# Patient Record
Sex: FEMALE | Race: WHITE | Employment: OTHER | URBAN - METROPOLITAN AREA
[De-identification: names, ages, dates, MRNs, and addresses within clinical notes are randomized per-mention and may not be internally consistent; named-entity substitution may affect disease eponyms.]

---

## 2019-01-01 ENCOUNTER — APPOINTMENT (OUTPATIENT)
Dept: CT IMAGING | Facility: CLINIC | Age: 84
DRG: 981 | End: 2019-01-01
Attending: EMERGENCY MEDICINE
Payer: COMMERCIAL

## 2019-01-01 ENCOUNTER — APPOINTMENT (OUTPATIENT)
Dept: GENERAL RADIOLOGY | Facility: CLINIC | Age: 84
End: 2019-01-01
Payer: COMMERCIAL

## 2019-01-01 ENCOUNTER — APPOINTMENT (OUTPATIENT)
Dept: CT IMAGING | Facility: CLINIC | Age: 84
DRG: 981 | End: 2019-01-01
Attending: HOSPITALIST
Payer: COMMERCIAL

## 2019-01-01 ENCOUNTER — HOSPITAL ENCOUNTER (OUTPATIENT)
Facility: CLINIC | Age: 84
Setting detail: OBSERVATION
Discharge: HOME | End: 2019-08-14
Attending: EMERGENCY MEDICINE | Admitting: INTERNAL MEDICINE
Payer: COMMERCIAL

## 2019-01-01 ENCOUNTER — APPOINTMENT (OUTPATIENT)
Dept: ULTRASOUND IMAGING | Facility: CLINIC | Age: 84
DRG: 981 | End: 2019-01-01
Attending: INTERNAL MEDICINE
Payer: COMMERCIAL

## 2019-01-01 ENCOUNTER — HOSPITAL ENCOUNTER (EMERGENCY)
Facility: CLINIC | Age: 84
Discharge: HOME | End: 2019-07-16
Admitting: PHYSICIAN ASSISTANT
Payer: COMMERCIAL

## 2019-01-01 ENCOUNTER — APPOINTMENT (OUTPATIENT)
Dept: GENERAL RADIOLOGY | Facility: CLINIC | Age: 84
DRG: 981 | End: 2019-01-01
Attending: EMERGENCY MEDICINE
Payer: COMMERCIAL

## 2019-01-01 ENCOUNTER — ANESTHESIA EVENT (OUTPATIENT)
Dept: SURGERY | Facility: CLINIC | Age: 84
DRG: 981 | End: 2019-01-01
Payer: COMMERCIAL

## 2019-01-01 ENCOUNTER — APPOINTMENT (OUTPATIENT)
Dept: ULTRASOUND IMAGING | Facility: CLINIC | Age: 84
End: 2019-01-01
Payer: COMMERCIAL

## 2019-01-01 ENCOUNTER — APPOINTMENT (OUTPATIENT)
Dept: PHYSICAL THERAPY | Facility: CLINIC | Age: 84
DRG: 981 | End: 2019-01-01
Attending: HOSPITALIST
Payer: COMMERCIAL

## 2019-01-01 ENCOUNTER — ANESTHESIA (OUTPATIENT)
Dept: SURGERY | Facility: CLINIC | Age: 84
DRG: 981 | End: 2019-01-01
Payer: COMMERCIAL

## 2019-01-01 ENCOUNTER — HOSPITAL ENCOUNTER (INPATIENT)
Facility: CLINIC | Age: 84
LOS: 8 days | DRG: 981 | End: 2019-09-05
Attending: EMERGENCY MEDICINE | Admitting: INTERNAL MEDICINE
Payer: COMMERCIAL

## 2019-01-01 ENCOUNTER — TRANSFERRED RECORDS (OUTPATIENT)
Dept: HEALTH INFORMATION MANAGEMENT | Facility: CLINIC | Age: 84
End: 2019-01-01

## 2019-01-01 ENCOUNTER — APPOINTMENT (OUTPATIENT)
Dept: PHYSICAL THERAPY | Facility: CLINIC | Age: 84
End: 2019-01-01
Attending: INTERNAL MEDICINE
Payer: COMMERCIAL

## 2019-01-01 ENCOUNTER — APPOINTMENT (OUTPATIENT)
Dept: CT IMAGING | Facility: CLINIC | Age: 84
End: 2019-01-01
Attending: EMERGENCY MEDICINE
Payer: COMMERCIAL

## 2019-01-01 ENCOUNTER — APPOINTMENT (OUTPATIENT)
Dept: CARDIOLOGY | Facility: CLINIC | Age: 84
DRG: 981 | End: 2019-01-01
Attending: HOSPITALIST
Payer: COMMERCIAL

## 2019-01-01 ENCOUNTER — APPOINTMENT (OUTPATIENT)
Dept: SPEECH THERAPY | Facility: CLINIC | Age: 84
DRG: 981 | End: 2019-01-01
Attending: STUDENT IN AN ORGANIZED HEALTH CARE EDUCATION/TRAINING PROGRAM
Payer: COMMERCIAL

## 2019-01-01 ENCOUNTER — APPOINTMENT (OUTPATIENT)
Dept: GENERAL RADIOLOGY | Facility: CLINIC | Age: 84
DRG: 981 | End: 2019-01-01
Attending: SURGERY
Payer: COMMERCIAL

## 2019-01-01 ENCOUNTER — APPOINTMENT (OUTPATIENT)
Dept: GENERAL RADIOLOGY | Facility: CLINIC | Age: 84
DRG: 981 | End: 2019-01-01
Payer: COMMERCIAL

## 2019-01-01 VITALS
TEMPERATURE: 96.2 F | SYSTOLIC BLOOD PRESSURE: 154 MMHG | HEIGHT: 62 IN | RESPIRATION RATE: 20 BRPM | HEART RATE: 59 BPM | WEIGHT: 107 LBS | BODY MASS INDEX: 19.69 KG/M2 | DIASTOLIC BLOOD PRESSURE: 44 MMHG | OXYGEN SATURATION: 93 %

## 2019-01-01 VITALS
DIASTOLIC BLOOD PRESSURE: 67 MMHG | BODY MASS INDEX: 20.61 KG/M2 | WEIGHT: 112 LBS | OXYGEN SATURATION: 92 % | HEIGHT: 62 IN | SYSTOLIC BLOOD PRESSURE: 179 MMHG | HEART RATE: 82 BPM | RESPIRATION RATE: 18 BRPM

## 2019-01-01 VITALS
RESPIRATION RATE: 16 BRPM | SYSTOLIC BLOOD PRESSURE: 128 MMHG | HEART RATE: 73 BPM | TEMPERATURE: 97.4 F | WEIGHT: 121.25 LBS | DIASTOLIC BLOOD PRESSURE: 62 MMHG | BODY MASS INDEX: 22.31 KG/M2 | HEIGHT: 62 IN | OXYGEN SATURATION: 91 %

## 2019-01-01 DIAGNOSIS — J44.1 COPD EXACERBATION (H): ICD-10-CM

## 2019-01-01 DIAGNOSIS — R91.8 LUNG INFILTRATE: ICD-10-CM

## 2019-01-01 DIAGNOSIS — R06.00 DYSPNEA, UNSPECIFIED TYPE: ICD-10-CM

## 2019-01-01 DIAGNOSIS — S32.10XA CLOSED FRACTURE OF SACRUM, UNSPECIFIED PORTION OF SACRUM, INITIAL ENCOUNTER (H): ICD-10-CM

## 2019-01-01 LAB
ALBUMIN SERPL-MCNC: 2.6 G/DL (ref 3.4–5)
ALBUMIN SERPL-MCNC: 2.9 G/DL (ref 3.4–5)
ALBUMIN SERPL-MCNC: 3.4 G/DL (ref 3.4–5)
ALBUMIN UR-MCNC: 100 MG/DL
ALBUMIN UR-MCNC: 30 MG/DL
ALP SERPL-CCNC: 142 U/L (ref 40–150)
ALP SERPL-CCNC: 73 U/L (ref 40–150)
ALP SERPL-CCNC: 83 U/L (ref 40–150)
ALT SERPL W P-5'-P-CCNC: 15 U/L (ref 0–50)
ALT SERPL W P-5'-P-CCNC: 27 U/L (ref 0–50)
ALT SERPL W P-5'-P-CCNC: 36 U/L (ref 0–50)
ANION GAP SERPL CALCULATED.3IONS-SCNC: 1 MMOL/L (ref 3–14)
ANION GAP SERPL CALCULATED.3IONS-SCNC: 3 MMOL/L (ref 3–14)
ANION GAP SERPL CALCULATED.3IONS-SCNC: 3 MMOL/L (ref 3–14)
ANION GAP SERPL CALCULATED.3IONS-SCNC: 4 MMOL/L (ref 3–14)
ANION GAP SERPL CALCULATED.3IONS-SCNC: 5 MMOL/L (ref 3–14)
ANION GAP SERPL CALCULATED.3IONS-SCNC: 7 MMOL/L (ref 3–14)
ANION GAP SERPL CALCULATED.3IONS-SCNC: 8 MMOL/L (ref 3–14)
ANION GAP SERPL CALCULATED.3IONS-SCNC: <1 MMOL/L (ref 3–14)
APPEARANCE UR: CLEAR
APPEARANCE UR: CLEAR
AST SERPL W P-5'-P-CCNC: 17 U/L (ref 0–45)
AST SERPL W P-5'-P-CCNC: 20 U/L (ref 0–45)
AST SERPL W P-5'-P-CCNC: 23 U/L (ref 0–45)
BASE DEFICIT BLDA-SCNC: 1.3 MMOL/L
BASE DEFICIT BLDA-SCNC: 2.3 MMOL/L
BASE EXCESS BLDA CALC-SCNC: 2.9 MMOL/L
BASE EXCESS BLDA CALC-SCNC: 5.8 MMOL/L
BASE EXCESS BLDA CALC-SCNC: 7.9 MMOL/L
BASE EXCESS BLDV CALC-SCNC: 7.5 MMOL/L
BASE EXCESS BLDV CALC-SCNC: 9.2 MMOL/L
BASOPHILS # BLD AUTO: 0 10E9/L (ref 0–0.2)
BASOPHILS NFR BLD AUTO: 0.1 %
BASOPHILS NFR BLD AUTO: 0.2 %
BASOPHILS NFR BLD AUTO: 0.4 %
BILIRUB SERPL-MCNC: 0.2 MG/DL (ref 0.2–1.3)
BILIRUB SERPL-MCNC: 0.3 MG/DL (ref 0.2–1.3)
BILIRUB SERPL-MCNC: 0.3 MG/DL (ref 0.2–1.3)
BILIRUB UR QL STRIP: NEGATIVE
BILIRUB UR QL STRIP: NEGATIVE
BUN SERPL-MCNC: 14 MG/DL (ref 7–30)
BUN SERPL-MCNC: 15 MG/DL (ref 7–30)
BUN SERPL-MCNC: 37 MG/DL (ref 7–30)
BUN SERPL-MCNC: 40 MG/DL (ref 7–30)
BUN SERPL-MCNC: 40 MG/DL (ref 7–30)
BUN SERPL-MCNC: 42 MG/DL (ref 7–30)
BUN SERPL-MCNC: 42 MG/DL (ref 7–30)
BUN SERPL-MCNC: 44 MG/DL (ref 7–30)
BUN SERPL-MCNC: 46 MG/DL (ref 7–30)
CA-I SERPL ISE-MCNC: 4.6 MG/DL (ref 4.4–5.2)
CALCIUM SERPL-MCNC: 7.8 MG/DL (ref 8.5–10.1)
CALCIUM SERPL-MCNC: 7.9 MG/DL (ref 8.5–10.1)
CALCIUM SERPL-MCNC: 8.2 MG/DL (ref 8.5–10.1)
CALCIUM SERPL-MCNC: 8.2 MG/DL (ref 8.5–10.1)
CALCIUM SERPL-MCNC: 8.4 MG/DL (ref 8.5–10.1)
CALCIUM SERPL-MCNC: 8.4 MG/DL (ref 8.5–10.1)
CALCIUM SERPL-MCNC: 8.6 MG/DL (ref 8.5–10.1)
CALCIUM SERPL-MCNC: 8.8 MG/DL (ref 8.5–10.1)
CALCIUM SERPL-MCNC: 9.2 MG/DL (ref 8.5–10.1)
CHLORIDE SERPL-SCNC: 101 MMOL/L (ref 94–109)
CHLORIDE SERPL-SCNC: 102 MMOL/L (ref 94–109)
CHLORIDE SERPL-SCNC: 105 MMOL/L (ref 94–109)
CHLORIDE SERPL-SCNC: 107 MMOL/L (ref 94–109)
CHLORIDE SERPL-SCNC: 107 MMOL/L (ref 94–109)
CHLORIDE SERPL-SCNC: 110 MMOL/L (ref 94–109)
CHLORIDE SERPL-SCNC: 113 MMOL/L (ref 94–109)
CHLORIDE SERPL-SCNC: 115 MMOL/L (ref 94–109)
CHLORIDE SERPL-SCNC: 98 MMOL/L (ref 94–109)
CO2 BLDCOV-SCNC: 37 MMOL/L (ref 21–28)
CO2 SERPL-SCNC: 26 MMOL/L (ref 20–32)
CO2 SERPL-SCNC: 28 MMOL/L (ref 20–32)
CO2 SERPL-SCNC: 28 MMOL/L (ref 20–32)
CO2 SERPL-SCNC: 29 MMOL/L (ref 20–32)
CO2 SERPL-SCNC: 32 MMOL/L (ref 20–32)
CO2 SERPL-SCNC: 32 MMOL/L (ref 20–32)
CO2 SERPL-SCNC: 33 MMOL/L (ref 20–32)
CO2 SERPL-SCNC: 36 MMOL/L (ref 20–32)
CO2 SERPL-SCNC: 41 MMOL/L (ref 20–32)
COLOR UR AUTO: YELLOW
COLOR UR AUTO: YELLOW
CREAT SERPL-MCNC: 0.63 MG/DL (ref 0.55–1.02)
CREAT SERPL-MCNC: 0.66 MG/DL (ref 0.52–1.04)
CREAT SERPL-MCNC: 0.69 MG/DL (ref 0.52–1.04)
CREAT SERPL-MCNC: 0.73 MG/DL (ref 0.52–1.04)
CREAT SERPL-MCNC: 0.75 MG/DL (ref 0.52–1.04)
CREAT SERPL-MCNC: 0.77 MG/DL (ref 0.52–1.04)
CREAT SERPL-MCNC: 0.78 MG/DL (ref 0.52–1.04)
CREAT SERPL-MCNC: 0.82 MG/DL (ref 0.52–1.04)
CREAT SERPL-MCNC: 0.83 MG/DL (ref 0.52–1.04)
CREAT SERPL-MCNC: 0.92 MG/DL (ref 0.52–1.04)
CREAT SERPL-MCNC: 0.97 MG/DL (ref 0.52–1.04)
CREAT UR-MCNC: 92 MG/DL
D DIMER PPP FEU-MCNC: 2.4 UG/ML FEU (ref 0–0.5)
DIFFERENTIAL METHOD BLD: ABNORMAL
EOSINOPHIL # BLD AUTO: 0.1 10E9/L (ref 0–0.7)
EOSINOPHIL NFR BLD AUTO: 0.7 %
EOSINOPHIL NFR BLD AUTO: 1.2 %
EOSINOPHIL NFR BLD AUTO: 1.3 %
ERYTHROCYTE [DISTWIDTH] IN BLOOD BY AUTOMATED COUNT: 14 % (ref 10–15)
ERYTHROCYTE [DISTWIDTH] IN BLOOD BY AUTOMATED COUNT: 14.8 % (ref 10–15)
ERYTHROCYTE [DISTWIDTH] IN BLOOD BY AUTOMATED COUNT: 15.6 % (ref 10–15)
ERYTHROCYTE [DISTWIDTH] IN BLOOD BY AUTOMATED COUNT: 15.7 % (ref 10–15)
ERYTHROCYTE [DISTWIDTH] IN BLOOD BY AUTOMATED COUNT: 16 % (ref 10–15)
ERYTHROCYTE [DISTWIDTH] IN BLOOD BY AUTOMATED COUNT: 16.1 % (ref 10–15)
FRACT EXCRET NA UR+SERPL-RTO: 0.1 %
GFR SERPL CREATININE-BSD FRML MDRD: 52 ML/MIN/{1.73_M2}
GFR SERPL CREATININE-BSD FRML MDRD: 56 ML/MIN/{1.73_M2}
GFR SERPL CREATININE-BSD FRML MDRD: 63 ML/MIN/{1.73_M2}
GFR SERPL CREATININE-BSD FRML MDRD: 64 ML/MIN/{1.73_M2}
GFR SERPL CREATININE-BSD FRML MDRD: 68 ML/MIN/{1.73_M2}
GFR SERPL CREATININE-BSD FRML MDRD: 69 ML/MIN/{1.73_M2}
GFR SERPL CREATININE-BSD FRML MDRD: 72 ML/MIN/{1.73_M2}
GFR SERPL CREATININE-BSD FRML MDRD: 74 ML/MIN/{1.73_M2}
GFR SERPL CREATININE-BSD FRML MDRD: 78 ML/MIN/{1.73_M2}
GFR SERPL CREATININE-BSD FRML MDRD: 79 ML/MIN/{1.73_M2}
GFR SERPL CREATININE-BSD FRML MDRD: >60 ML/MIN/1.73M2
GLUCOSE BLDC GLUCOMTR-MCNC: 100 MG/DL (ref 70–99)
GLUCOSE BLDC GLUCOMTR-MCNC: 102 MG/DL (ref 70–99)
GLUCOSE BLDC GLUCOMTR-MCNC: 102 MG/DL (ref 70–99)
GLUCOSE BLDC GLUCOMTR-MCNC: 103 MG/DL (ref 70–99)
GLUCOSE BLDC GLUCOMTR-MCNC: 114 MG/DL (ref 70–99)
GLUCOSE BLDC GLUCOMTR-MCNC: 118 MG/DL (ref 70–99)
GLUCOSE BLDC GLUCOMTR-MCNC: 124 MG/DL (ref 70–99)
GLUCOSE BLDC GLUCOMTR-MCNC: 124 MG/DL (ref 70–99)
GLUCOSE BLDC GLUCOMTR-MCNC: 125 MG/DL (ref 70–99)
GLUCOSE BLDC GLUCOMTR-MCNC: 135 MG/DL (ref 70–99)
GLUCOSE BLDC GLUCOMTR-MCNC: 136 MG/DL (ref 70–99)
GLUCOSE BLDC GLUCOMTR-MCNC: 140 MG/DL (ref 70–99)
GLUCOSE BLDC GLUCOMTR-MCNC: 147 MG/DL (ref 70–99)
GLUCOSE BLDC GLUCOMTR-MCNC: 83 MG/DL (ref 70–99)
GLUCOSE BLDC GLUCOMTR-MCNC: 93 MG/DL (ref 70–99)
GLUCOSE BLDC GLUCOMTR-MCNC: 97 MG/DL (ref 70–99)
GLUCOSE SERPL-MCNC: 102 MG/DL (ref 70–99)
GLUCOSE SERPL-MCNC: 104 MG/DL (ref 70–100)
GLUCOSE SERPL-MCNC: 110 MG/DL (ref 70–99)
GLUCOSE SERPL-MCNC: 112 MG/DL (ref 70–99)
GLUCOSE SERPL-MCNC: 114 MG/DL (ref 70–99)
GLUCOSE SERPL-MCNC: 122 MG/DL (ref 70–99)
GLUCOSE SERPL-MCNC: 130 MG/DL (ref 70–99)
GLUCOSE SERPL-MCNC: 155 MG/DL (ref 70–99)
GLUCOSE SERPL-MCNC: 85 MG/DL (ref 70–99)
GLUCOSE SERPL-MCNC: 86 MG/DL (ref 70–99)
GLUCOSE UR STRIP-MCNC: NEGATIVE MG/DL
GLUCOSE UR STRIP-MCNC: NEGATIVE MG/DL
HCO3 BLD-SCNC: 25 MMOL/L (ref 21–28)
HCO3 BLD-SCNC: 26 MMOL/L (ref 21–28)
HCO3 BLD-SCNC: 29 MMOL/L (ref 21–28)
HCO3 BLD-SCNC: 32 MMOL/L (ref 21–28)
HCO3 BLD-SCNC: 32 MMOL/L (ref 21–28)
HCO3 BLDV-SCNC: 35 MMOL/L (ref 21–28)
HCO3 BLDV-SCNC: 36 MMOL/L (ref 21–28)
HCT VFR BLD AUTO: 26.9 % (ref 35–47)
HCT VFR BLD AUTO: 27.3 % (ref 35–47)
HCT VFR BLD AUTO: 31.7 % (ref 35–47)
HCT VFR BLD AUTO: 32.8 % (ref 35–47)
HCT VFR BLD AUTO: 32.9 % (ref 35–47)
HCT VFR BLD AUTO: 36.1 % (ref 35–47)
HCT VFR BLD AUTO: 37.7 % (ref 35–47)
HCT VFR BLD AUTO: 41.1 % (ref 35–47)
HCT VFR BLD AUTO: 42.7 % (ref 35–47)
HGB BLD-MCNC: 10 G/DL (ref 11.7–15.7)
HGB BLD-MCNC: 10.4 G/DL (ref 11.7–15.7)
HGB BLD-MCNC: 10.6 G/DL (ref 11.7–15.7)
HGB BLD-MCNC: 11 G/DL (ref 11.7–15.7)
HGB BLD-MCNC: 11.9 G/DL (ref 11.7–15.7)
HGB BLD-MCNC: 12.8 G/DL (ref 11.7–15.7)
HGB BLD-MCNC: 12.8 G/DL (ref 11.7–15.7)
HGB BLD-MCNC: 8.9 G/DL (ref 11.7–15.7)
HGB BLD-MCNC: 9.1 G/DL (ref 11.7–15.7)
HGB UR QL STRIP: NEGATIVE
HGB UR QL STRIP: NEGATIVE
HYALINE CASTS #/AREA URNS LPF: 2 /LPF (ref 0–2)
IMM GRANULOCYTES # BLD: 0 10E9/L (ref 0–0.4)
IMM GRANULOCYTES # BLD: 0.1 10E9/L (ref 0–0.4)
IMM GRANULOCYTES # BLD: 0.1 10E9/L (ref 0–0.4)
IMM GRANULOCYTES NFR BLD: 0.2 %
IMM GRANULOCYTES NFR BLD: 0.6 %
IMM GRANULOCYTES NFR BLD: 0.9 %
INTERPRETATION ECG - MUSE: NORMAL
KETONES UR STRIP-MCNC: 10 MG/DL
KETONES UR STRIP-MCNC: NEGATIVE MG/DL
LACTATE BLD-SCNC: 0.7 MMOL/L (ref 0.7–2)
LACTATE BLD-SCNC: 0.7 MMOL/L (ref 0.7–2.1)
LACTATE BLD-SCNC: 1.2 MMOL/L (ref 0.7–2)
LACTATE SERPL-SCNC: 0.5 MMOL/L (ref 0.4–2)
LEUKOCYTE ESTERASE UR QL STRIP: NEGATIVE
LEUKOCYTE ESTERASE UR QL STRIP: NEGATIVE
LYMPHOCYTES # BLD AUTO: 0.6 10E9/L (ref 0.8–5.3)
LYMPHOCYTES # BLD AUTO: 0.8 10E9/L (ref 0.8–5.3)
LYMPHOCYTES # BLD AUTO: 0.8 10E9/L (ref 0.8–5.3)
LYMPHOCYTES NFR BLD AUTO: 7 %
LYMPHOCYTES NFR BLD AUTO: 7.4 %
LYMPHOCYTES NFR BLD AUTO: 9.7 %
MAGNESIUM SERPL-MCNC: 2.2 MG/DL (ref 1.6–2.3)
MAGNESIUM SERPL-MCNC: 2.3 MG/DL (ref 1.6–2.3)
MAGNESIUM SERPL-MCNC: 2.3 MG/DL (ref 1.6–2.3)
MAGNESIUM SERPL-MCNC: 2.6 MG/DL (ref 1.6–2.3)
MAGNESIUM SERPL-MCNC: 2.8 MG/DL (ref 1.6–2.3)
MCH RBC QN AUTO: 29.4 PG (ref 26.5–33)
MCH RBC QN AUTO: 29.9 PG (ref 26.5–33)
MCH RBC QN AUTO: 30.1 PG (ref 26.5–33)
MCH RBC QN AUTO: 30.1 PG (ref 26.5–33)
MCH RBC QN AUTO: 30.2 PG (ref 26.5–33)
MCH RBC QN AUTO: 30.3 PG (ref 26.5–33)
MCH RBC QN AUTO: 31 PG (ref 26.5–33)
MCHC RBC AUTO-ENTMCNC: 30 G/DL (ref 31.5–36.5)
MCHC RBC AUTO-ENTMCNC: 30.5 G/DL (ref 31.5–36.5)
MCHC RBC AUTO-ENTMCNC: 31.1 G/DL (ref 31.5–36.5)
MCHC RBC AUTO-ENTMCNC: 31.5 G/DL (ref 31.5–36.5)
MCHC RBC AUTO-ENTMCNC: 31.6 G/DL (ref 31.5–36.5)
MCHC RBC AUTO-ENTMCNC: 31.7 G/DL (ref 31.5–36.5)
MCHC RBC AUTO-ENTMCNC: 32.2 G/DL (ref 31.5–36.5)
MCHC RBC AUTO-ENTMCNC: 32.6 G/DL (ref 31.5–36.5)
MCHC RBC AUTO-ENTMCNC: 33.8 G/DL (ref 31.5–36.5)
MCV RBC AUTO: 101 FL (ref 78–100)
MCV RBC AUTO: 92 FL (ref 78–100)
MCV RBC AUTO: 93 FL (ref 78–100)
MCV RBC AUTO: 94 FL (ref 78–100)
MCV RBC AUTO: 94 FL (ref 78–100)
MCV RBC AUTO: 95 FL (ref 78–100)
MCV RBC AUTO: 96 FL (ref 78–100)
MCV RBC AUTO: 97 FL (ref 78–100)
MCV RBC AUTO: 97 FL (ref 78–100)
MONOCYTES # BLD AUTO: 0.8 10E9/L (ref 0–1.3)
MONOCYTES # BLD AUTO: 0.9 10E9/L (ref 0–1.3)
MONOCYTES # BLD AUTO: 1 10E9/L (ref 0–1.3)
MONOCYTES NFR BLD AUTO: 10.7 %
MONOCYTES NFR BLD AUTO: 8.6 %
MONOCYTES NFR BLD AUTO: 8.7 %
MUCOUS THREADS #/AREA URNS LPF: PRESENT /LPF
MUCOUS THREADS #/AREA URNS LPF: PRESENT /LPF
NEUTROPHILS # BLD AUTO: 6.4 10E9/L (ref 1.6–8.3)
NEUTROPHILS # BLD AUTO: 7.1 10E9/L (ref 1.6–8.3)
NEUTROPHILS # BLD AUTO: 9.7 10E9/L (ref 1.6–8.3)
NEUTROPHILS NFR BLD AUTO: 78.3 %
NEUTROPHILS NFR BLD AUTO: 81.8 %
NEUTROPHILS NFR BLD AUTO: 82.2 %
NITRATE UR QL: NEGATIVE
NITRATE UR QL: NEGATIVE
NRBC # BLD AUTO: 0 10*3/UL
NRBC BLD AUTO-RTO: 0 /100
NT-PROBNP SERPL-MCNC: 568 PG/ML (ref 0–1800)
NT-PROBNP SERPL-MCNC: 967 PG/ML (ref 0–1800)
O2/TOTAL GAS SETTING VFR VENT: ABNORMAL %
O2/TOTAL GAS SETTING VFR VENT: ABNORMAL %
OXYHGB MFR BLD: 93 % (ref 92–100)
OXYHGB MFR BLD: 95 % (ref 92–100)
OXYHGB MFR BLD: 95 % (ref 92–100)
OXYHGB MFR BLD: 96 % (ref 92–100)
OXYHGB MFR BLD: 98 % (ref 92–100)
OXYHGB MFR BLDV: 82 %
OXYHGB MFR BLDV: 89 %
PCO2 BLD: 37 MM HG (ref 35–45)
PCO2 BLD: 39 MM HG (ref 35–45)
PCO2 BLD: 45 MM HG (ref 35–45)
PCO2 BLD: 60 MM HG (ref 35–45)
PCO2 BLD: 77 MM HG (ref 35–45)
PCO2 BLDV: 61 MM HG (ref 40–50)
PCO2 BLDV: 62 MM HG (ref 40–50)
PCO2 BLDV: 64 MM HG (ref 40–50)
PH BLD: 7.23 PH (ref 7.35–7.45)
PH BLD: 7.24 PH (ref 7.35–7.45)
PH BLD: 7.34 PH (ref 7.35–7.45)
PH BLD: 7.51 PH (ref 7.35–7.45)
PH BLD: 7.51 PH (ref 7.35–7.45)
PH BLDV: 7.35 PH (ref 7.32–7.43)
PH BLDV: 7.37 PH (ref 7.32–7.43)
PH BLDV: 7.38 PH (ref 7.32–7.43)
PH UR STRIP: 5.5 PH (ref 5–7)
PH UR STRIP: 6.5 PH (ref 5–7)
PHOSPHATE SERPL-MCNC: 4.2 MG/DL (ref 2.5–4.5)
PHOSPHATE SERPL-MCNC: 4.3 MG/DL (ref 2.5–4.5)
PHOSPHATE SERPL-MCNC: 4.7 MG/DL (ref 2.5–4.5)
PLATELET # BLD AUTO: 187 10E9/L (ref 150–450)
PLATELET # BLD AUTO: 197 10E9/L (ref 150–450)
PLATELET # BLD AUTO: 216 10E9/L (ref 150–450)
PLATELET # BLD AUTO: 273 10E9/L (ref 150–450)
PLATELET # BLD AUTO: 283 10E9/L (ref 150–450)
PLATELET # BLD AUTO: 303 10E9/L (ref 150–450)
PLATELET # BLD AUTO: 310 10E9/L (ref 150–450)
PLATELET # BLD AUTO: 323 10E9/L (ref 150–450)
PLATELET # BLD AUTO: 444 10E9/L (ref 150–450)
PO2 BLD: 105 MM HG (ref 80–105)
PO2 BLD: 116 MM HG (ref 80–105)
PO2 BLD: 136 MM HG (ref 80–105)
PO2 BLD: 67 MM HG (ref 80–105)
PO2 BLD: 98 MM HG (ref 80–105)
PO2 BLDV: 52 MM HG (ref 25–47)
PO2 BLDV: 60 MM HG (ref 25–47)
PO2 BLDV: 63 MM HG (ref 25–47)
POTASSIUM SERPL-SCNC: 3.2 MMOL/L (ref 3.4–5.3)
POTASSIUM SERPL-SCNC: 3.4 MMOL/L (ref 3.4–5.3)
POTASSIUM SERPL-SCNC: 3.7 MMOL/L (ref 3.4–5.3)
POTASSIUM SERPL-SCNC: 4 MMOL/L (ref 3.4–5.3)
POTASSIUM SERPL-SCNC: 4.2 MMOL/L (ref 3.5–5.1)
POTASSIUM SERPL-SCNC: 4.4 MMOL/L (ref 3.4–5.3)
POTASSIUM SERPL-SCNC: 4.7 MMOL/L (ref 3.4–5.3)
POTASSIUM SERPL-SCNC: 4.7 MMOL/L (ref 3.4–5.3)
PROCALCITONIN SERPL-MCNC: 0.39 NG/ML
PROCALCITONIN SERPL-MCNC: <0.05 NG/ML
PROT SERPL-MCNC: 5.5 G/DL (ref 6.8–8.8)
PROT SERPL-MCNC: 6.2 G/DL (ref 6.8–8.8)
PROT SERPL-MCNC: 6.3 G/DL (ref 6.8–8.8)
RADIOLOGIST FLAGS: ABNORMAL
RBC # BLD AUTO: 2.94 10E12/L (ref 3.8–5.2)
RBC # BLD AUTO: 2.95 10E12/L (ref 3.8–5.2)
RBC # BLD AUTO: 3.32 10E12/L (ref 3.8–5.2)
RBC # BLD AUTO: 3.48 10E12/L (ref 3.8–5.2)
RBC # BLD AUTO: 3.5 10E12/L (ref 3.8–5.2)
RBC # BLD AUTO: 3.74 10E12/L (ref 3.8–5.2)
RBC # BLD AUTO: 3.96 10E12/L (ref 3.8–5.2)
RBC # BLD AUTO: 4.22 10E12/L (ref 3.8–5.2)
RBC # BLD AUTO: 4.23 10E12/L (ref 3.8–5.2)
RBC #/AREA URNS AUTO: 1 /HPF (ref 0–2)
RBC #/AREA URNS AUTO: 3 /HPF (ref 0–2)
SAO2 % BLDV FROM PO2: 89 %
SODIUM SERPL-SCNC: 136 MMOL/L (ref 133–144)
SODIUM SERPL-SCNC: 139 MMOL/L (ref 133–144)
SODIUM SERPL-SCNC: 139 MMOL/L (ref 133–144)
SODIUM SERPL-SCNC: 142 MMOL/L (ref 133–144)
SODIUM SERPL-SCNC: 142 MMOL/L (ref 133–144)
SODIUM SERPL-SCNC: 143 MMOL/L (ref 133–144)
SODIUM SERPL-SCNC: 144 MMOL/L (ref 133–144)
SODIUM SERPL-SCNC: 147 MMOL/L (ref 133–144)
SODIUM SERPL-SCNC: 147 MMOL/L (ref 133–144)
SODIUM SERPL-SCNC: 148 MMOL/L (ref 133–144)
SODIUM UR-SCNC: 18 MMOL/L
SOURCE: ABNORMAL
SOURCE: ABNORMAL
SP GR UR STRIP: 1.02 (ref 1–1.03)
SP GR UR STRIP: 1.02 (ref 1–1.03)
SQUAMOUS #/AREA URNS AUTO: <1 /HPF (ref 0–1)
TROPONIN I SERPL-MCNC: <0.015 UG/L (ref 0–0.04)
TROPONIN I SERPL-MCNC: <0.015 UG/L (ref 0–0.04)
TSH SERPL DL<=0.005 MIU/L-ACNC: 1.61 MU/L (ref 0.4–4)
UROBILINOGEN UR STRIP-MCNC: NORMAL MG/DL (ref 0–2)
UROBILINOGEN UR STRIP-MCNC: NORMAL MG/DL (ref 0–2)
WBC # BLD AUTO: 11.7 10E9/L (ref 4–11)
WBC # BLD AUTO: 11.7 10E9/L (ref 4–11)
WBC # BLD AUTO: 16.1 10E9/L (ref 4–11)
WBC # BLD AUTO: 17.1 10E9/L (ref 4–11)
WBC # BLD AUTO: 23 10E9/L (ref 4–11)
WBC # BLD AUTO: 25 10E9/L (ref 4–11)
WBC # BLD AUTO: 8.1 10E9/L (ref 4–11)
WBC # BLD AUTO: 8.4 10E9/L (ref 4–11)
WBC # BLD AUTO: 8.6 10E9/L (ref 4–11)
WBC # BLD AUTO: 9.9 10E9/L (ref 4–11)
WBC #/AREA URNS AUTO: 2 /HPF (ref 0–5)
WBC #/AREA URNS AUTO: <1 /HPF (ref 0–5)

## 2019-01-01 PROCEDURE — 25000128 H RX IP 250 OP 636: Performed by: INTERNAL MEDICINE

## 2019-01-01 PROCEDURE — 72131 CT LUMBAR SPINE W/O DYE: CPT

## 2019-01-01 PROCEDURE — 84484 ASSAY OF TROPONIN QUANT: CPT | Performed by: EMERGENCY MEDICINE

## 2019-01-01 PROCEDURE — 25000128 H RX IP 250 OP 636: Performed by: SURGERY

## 2019-01-01 PROCEDURE — 80048 BASIC METABOLIC PNL TOTAL CA: CPT | Performed by: EMERGENCY MEDICINE

## 2019-01-01 PROCEDURE — 20000003 ZZH R&B ICU

## 2019-01-01 PROCEDURE — 25000128 H RX IP 250 OP 636: Performed by: HOSPITALIST

## 2019-01-01 PROCEDURE — 40000983 ZZH STATISTIC HFNC ADULT NON-CPAP

## 2019-01-01 PROCEDURE — 71045 X-RAY EXAM CHEST 1 VIEW: CPT

## 2019-01-01 PROCEDURE — 25000132 ZZH RX MED GY IP 250 OP 250 PS 637: Performed by: INTERNAL MEDICINE

## 2019-01-01 PROCEDURE — 83605 ASSAY OF LACTIC ACID: CPT | Performed by: HOSPITALIST

## 2019-01-01 PROCEDURE — 25000128 H RX IP 250 OP 636: Performed by: NURSE PRACTITIONER

## 2019-01-01 PROCEDURE — 99291 CRITICAL CARE FIRST HOUR: CPT | Performed by: NURSE PRACTITIONER

## 2019-01-01 PROCEDURE — 40000008 ZZH STATISTIC AIRWAY CARE

## 2019-01-01 PROCEDURE — C9113 INJ PANTOPRAZOLE SODIUM, VIA: HCPCS | Performed by: PHYSICIAN ASSISTANT

## 2019-01-01 PROCEDURE — 93971 EXTREMITY STUDY: CPT | Mod: RT

## 2019-01-01 PROCEDURE — 40000010 ZZH STATISTIC ANES STAT CODE-CRNA PER MINUTE

## 2019-01-01 PROCEDURE — 92610 EVALUATE SWALLOWING FUNCTION: CPT | Mod: GN | Performed by: SPEECH-LANGUAGE PATHOLOGIST

## 2019-01-01 PROCEDURE — 25000128 H RX IP 250 OP 636: Performed by: EMERGENCY MEDICINE

## 2019-01-01 PROCEDURE — 0DU947Z SUPPLEMENT DUODENUM WITH AUTOLOGOUS TISSUE SUBSTITUTE, PERCUTANEOUS ENDOSCOPIC APPROACH: ICD-10-PCS | Performed by: SURGERY

## 2019-01-01 PROCEDURE — 94003 VENT MGMT INPAT SUBQ DAY: CPT

## 2019-01-01 PROCEDURE — 83880 ASSAY OF NATRIURETIC PEPTIDE: CPT | Performed by: PHYSICIAN ASSISTANT

## 2019-01-01 PROCEDURE — C9113 INJ PANTOPRAZOLE SODIUM, VIA: HCPCS | Performed by: STUDENT IN AN ORGANIZED HEALTH CARE EDUCATION/TRAINING PROGRAM

## 2019-01-01 PROCEDURE — 94660 CPAP INITIATION&MGMT: CPT

## 2019-01-01 PROCEDURE — 40000275 ZZH STATISTIC RCP TIME EA 10 MIN

## 2019-01-01 PROCEDURE — 83880 ASSAY OF NATRIURETIC PEPTIDE: CPT | Performed by: EMERGENCY MEDICINE

## 2019-01-01 PROCEDURE — 82330 ASSAY OF CALCIUM: CPT | Performed by: INTERNAL MEDICINE

## 2019-01-01 PROCEDURE — 25000125 ZZHC RX 250: Performed by: REGISTERED NURSE

## 2019-01-01 PROCEDURE — 25000128 H RX IP 250 OP 636: Performed by: PHYSICIAN ASSISTANT

## 2019-01-01 PROCEDURE — 25800030 ZZH RX IP 258 OP 636: Performed by: PHYSICIAN ASSISTANT

## 2019-01-01 PROCEDURE — 40000986 XR CHEST PORT 1 VW

## 2019-01-01 PROCEDURE — 96374 THER/PROPH/DIAG INJ IV PUSH: CPT

## 2019-01-01 PROCEDURE — 25000132 ZZH RX MED GY IP 250 OP 250 PS 637: Performed by: STUDENT IN AN ORGANIZED HEALTH CARE EDUCATION/TRAINING PROGRAM

## 2019-01-01 PROCEDURE — 25000128 H RX IP 250 OP 636: Performed by: STUDENT IN AN ORGANIZED HEALTH CARE EDUCATION/TRAINING PROGRAM

## 2019-01-01 PROCEDURE — 5A1945Z RESPIRATORY VENTILATION, 24-96 CONSECUTIVE HOURS: ICD-10-PCS | Performed by: HOSPITALIST

## 2019-01-01 PROCEDURE — 83735 ASSAY OF MAGNESIUM: CPT | Performed by: NURSE PRACTITIONER

## 2019-01-01 PROCEDURE — 80053 COMPREHEN METABOLIC PANEL: CPT | Performed by: PHYSICIAN ASSISTANT

## 2019-01-01 PROCEDURE — 94002 VENT MGMT INPAT INIT DAY: CPT

## 2019-01-01 PROCEDURE — 99291 CRITICAL CARE FIRST HOUR: CPT | Mod: 24 | Performed by: INTERNAL MEDICINE

## 2019-01-01 PROCEDURE — 85027 COMPLETE CBC AUTOMATED: CPT | Performed by: INTERNAL MEDICINE

## 2019-01-01 PROCEDURE — 25800030 ZZH RX IP 258 OP 636: Performed by: INTERNAL MEDICINE

## 2019-01-01 PROCEDURE — 99233 SBSQ HOSP IP/OBS HIGH 50: CPT | Mod: 24 | Performed by: INTERNAL MEDICINE

## 2019-01-01 PROCEDURE — 83605 ASSAY OF LACTIC ACID: CPT | Performed by: NURSE PRACTITIONER

## 2019-01-01 PROCEDURE — 97161 PT EVAL LOW COMPLEX 20 MIN: CPT | Mod: GP | Performed by: PHYSICAL THERAPIST

## 2019-01-01 PROCEDURE — 93970 EXTREMITY STUDY: CPT | Mod: XS

## 2019-01-01 PROCEDURE — 25000566 ZZH SEVOFLURANE, EA 15 MIN: Performed by: SURGERY

## 2019-01-01 PROCEDURE — 25000125 ZZHC RX 250: Performed by: HOSPITALIST

## 2019-01-01 PROCEDURE — 25800030 ZZH RX IP 258 OP 636: Performed by: STUDENT IN AN ORGANIZED HEALTH CARE EDUCATION/TRAINING PROGRAM

## 2019-01-01 PROCEDURE — 99217 ZZC OBSERVATION CARE DISCHARGE: CPT | Performed by: PHYSICIAN ASSISTANT

## 2019-01-01 PROCEDURE — 71000013 ZZH RECOVERY PHASE 1 LEVEL 1 EA ADDTL HR: Performed by: SURGERY

## 2019-01-01 PROCEDURE — 40000281 ZZH STATISTIC TRANSPORT TIME EA 15 MIN

## 2019-01-01 PROCEDURE — 94640 AIRWAY INHALATION TREATMENT: CPT | Mod: 76

## 2019-01-01 PROCEDURE — 93005 ELECTROCARDIOGRAM TRACING: CPT

## 2019-01-01 PROCEDURE — 25000125 ZZHC RX 250: Performed by: EMERGENCY MEDICINE

## 2019-01-01 PROCEDURE — 93010 ELECTROCARDIOGRAM REPORT: CPT | Performed by: INTERNAL MEDICINE

## 2019-01-01 PROCEDURE — 0DQ94ZZ REPAIR DUODENUM, PERCUTANEOUS ENDOSCOPIC APPROACH: ICD-10-PCS | Performed by: SURGERY

## 2019-01-01 PROCEDURE — 25000132 ZZH RX MED GY IP 250 OP 250 PS 637: Performed by: HOSPITALIST

## 2019-01-01 PROCEDURE — 97162 PT EVAL MOD COMPLEX 30 MIN: CPT | Mod: GP | Performed by: PHYSICAL THERAPIST

## 2019-01-01 PROCEDURE — 84132 ASSAY OF SERUM POTASSIUM: CPT | Performed by: INTERNAL MEDICINE

## 2019-01-01 PROCEDURE — 12000000 ZZH R&B MED SURG/OB

## 2019-01-01 PROCEDURE — 25000128 H RX IP 250 OP 636: Performed by: REGISTERED NURSE

## 2019-01-01 PROCEDURE — 85048 AUTOMATED LEUKOCYTE COUNT: CPT | Performed by: HOSPITALIST

## 2019-01-01 PROCEDURE — 25000125 ZZHC RX 250: Performed by: NURSE PRACTITIONER

## 2019-01-01 PROCEDURE — 99223 1ST HOSP IP/OBS HIGH 75: CPT | Mod: AI | Performed by: INTERNAL MEDICINE

## 2019-01-01 PROCEDURE — 00000146 ZZHCL STATISTIC GLUCOSE BY METER IP

## 2019-01-01 PROCEDURE — 85025 COMPLETE CBC W/AUTO DIFF WBC: CPT | Performed by: EMERGENCY MEDICINE

## 2019-01-01 PROCEDURE — 36415 COLL VENOUS BLD VENIPUNCTURE: CPT | Performed by: HOSPITALIST

## 2019-01-01 PROCEDURE — 83605 ASSAY OF LACTIC ACID: CPT | Performed by: INTERNAL MEDICINE

## 2019-01-01 PROCEDURE — 25800030 ZZH RX IP 258 OP 636: Performed by: SURGERY

## 2019-01-01 PROCEDURE — 36415 COLL VENOUS BLD VENIPUNCTURE: CPT | Performed by: INTERNAL MEDICINE

## 2019-01-01 PROCEDURE — 37000008 ZZH ANESTHESIA TECHNICAL FEE, 1ST 30 MIN: Performed by: SURGERY

## 2019-01-01 PROCEDURE — 25000128 H RX IP 250 OP 636

## 2019-01-01 PROCEDURE — 37000008 ZZH ANESTHESIA TECHNICAL FEE, 1ST 30 MIN

## 2019-01-01 PROCEDURE — 83735 ASSAY OF MAGNESIUM: CPT | Performed by: INTERNAL MEDICINE

## 2019-01-01 PROCEDURE — 82805 BLOOD GASES W/O2 SATURATION: CPT | Performed by: INTERNAL MEDICINE

## 2019-01-01 PROCEDURE — 82565 ASSAY OF CREATININE: CPT | Performed by: INTERNAL MEDICINE

## 2019-01-01 PROCEDURE — 25000125 ZZHC RX 250: Performed by: INTERNAL MEDICINE

## 2019-01-01 PROCEDURE — G0378 HOSPITAL OBSERVATION PER HR: HCPCS

## 2019-01-01 PROCEDURE — 36000093 ZZH SURGERY LEVEL 4 1ST 30 MIN: Performed by: SURGERY

## 2019-01-01 PROCEDURE — 25000128 H RX IP 250 OP 636: Performed by: ANESTHESIOLOGY

## 2019-01-01 PROCEDURE — 81001 URINALYSIS AUTO W/SCOPE: CPT | Performed by: PHYSICIAN ASSISTANT

## 2019-01-01 PROCEDURE — 82570 ASSAY OF URINE CREATININE: CPT | Performed by: INTERNAL MEDICINE

## 2019-01-01 PROCEDURE — 25800030 ZZH RX IP 258 OP 636: Performed by: REGISTERED NURSE

## 2019-01-01 PROCEDURE — 25500064 ZZH RX 255 OP 636: Performed by: INTERNAL MEDICINE

## 2019-01-01 PROCEDURE — 96367 TX/PROPH/DG ADDL SEQ IV INF: CPT

## 2019-01-01 PROCEDURE — 84295 ASSAY OF SERUM SODIUM: CPT | Performed by: INTERNAL MEDICINE

## 2019-01-01 PROCEDURE — 71275 CT ANGIOGRAPHY CHEST: CPT

## 2019-01-01 PROCEDURE — 31500 INSERT EMERGENCY AIRWAY: CPT

## 2019-01-01 PROCEDURE — 80053 COMPREHEN METABOLIC PANEL: CPT | Performed by: EMERGENCY MEDICINE

## 2019-01-01 PROCEDURE — 25000132 ZZH RX MED GY IP 250 OP 250 PS 637: Performed by: PHYSICIAN ASSISTANT

## 2019-01-01 PROCEDURE — 97530 THERAPEUTIC ACTIVITIES: CPT | Mod: GP | Performed by: PHYSICAL THERAPIST

## 2019-01-01 PROCEDURE — 80048 BASIC METABOLIC PNL TOTAL CA: CPT | Performed by: INTERNAL MEDICINE

## 2019-01-01 PROCEDURE — 99285 EMERGENCY DEPT VISIT HI MDM: CPT | Mod: 25

## 2019-01-01 PROCEDURE — 84145 PROCALCITONIN (PCT): CPT | Performed by: INTERNAL MEDICINE

## 2019-01-01 PROCEDURE — 84100 ASSAY OF PHOSPHORUS: CPT | Performed by: INTERNAL MEDICINE

## 2019-01-01 PROCEDURE — 74177 CT ABD & PELVIS W/CONTRAST: CPT

## 2019-01-01 PROCEDURE — 99291 CRITICAL CARE FIRST HOUR: CPT | Performed by: INTERNAL MEDICINE

## 2019-01-01 PROCEDURE — 99232 SBSQ HOSP IP/OBS MODERATE 35: CPT | Performed by: HOSPITALIST

## 2019-01-01 PROCEDURE — 71046 X-RAY EXAM CHEST 2 VIEWS: CPT

## 2019-01-01 PROCEDURE — 99219 ZZC INITIAL OBSERVATION CARE,LEVL II: CPT | Performed by: INTERNAL MEDICINE

## 2019-01-01 PROCEDURE — 82805 BLOOD GASES W/O2 SATURATION: CPT | Performed by: ANESTHESIOLOGY

## 2019-01-01 PROCEDURE — 99233 SBSQ HOSP IP/OBS HIGH 50: CPT | Performed by: HOSPITALIST

## 2019-01-01 PROCEDURE — 27210995 ZZH RX 272: Performed by: SURGERY

## 2019-01-01 PROCEDURE — 40000170 ZZH STATISTIC PRE-PROCEDURE ASSESSMENT II: Performed by: SURGERY

## 2019-01-01 PROCEDURE — 85379 FIBRIN DEGRADATION QUANT: CPT | Performed by: EMERGENCY MEDICINE

## 2019-01-01 PROCEDURE — 83735 ASSAY OF MAGNESIUM: CPT | Performed by: EMERGENCY MEDICINE

## 2019-01-01 PROCEDURE — 36000063 ZZH SURGERY LEVEL 4 EA 15 ADDTL MIN: Performed by: SURGERY

## 2019-01-01 PROCEDURE — 94640 AIRWAY INHALATION TREATMENT: CPT

## 2019-01-01 PROCEDURE — 12000047 ZZH R&B IMCU

## 2019-01-01 PROCEDURE — 37000009 ZZH ANESTHESIA TECHNICAL FEE, EACH ADDTL 15 MIN: Performed by: SURGERY

## 2019-01-01 PROCEDURE — 25000125 ZZHC RX 250: Performed by: STUDENT IN AN ORGANIZED HEALTH CARE EDUCATION/TRAINING PROGRAM

## 2019-01-01 PROCEDURE — 25000131 ZZH RX MED GY IP 250 OP 636 PS 637: Performed by: INTERNAL MEDICINE

## 2019-01-01 PROCEDURE — 85025 COMPLETE CBC W/AUTO DIFF WBC: CPT | Performed by: PHYSICIAN ASSISTANT

## 2019-01-01 PROCEDURE — 82805 BLOOD GASES W/O2 SATURATION: CPT | Performed by: HOSPITALIST

## 2019-01-01 PROCEDURE — 82805 BLOOD GASES W/O2 SATURATION: CPT | Performed by: STUDENT IN AN ORGANIZED HEALTH CARE EDUCATION/TRAINING PROGRAM

## 2019-01-01 PROCEDURE — 84145 PROCALCITONIN (PCT): CPT | Performed by: EMERGENCY MEDICINE

## 2019-01-01 PROCEDURE — 96368 THER/DIAG CONCURRENT INF: CPT

## 2019-01-01 PROCEDURE — 93306 TTE W/DOPPLER COMPLETE: CPT | Mod: 26 | Performed by: INTERNAL MEDICINE

## 2019-01-01 PROCEDURE — 96365 THER/PROPH/DIAG IV INF INIT: CPT

## 2019-01-01 PROCEDURE — P9041 ALBUMIN (HUMAN),5%, 50ML: HCPCS | Performed by: REGISTERED NURSE

## 2019-01-01 PROCEDURE — 27211316 ZZ H MASK, CPAP TOTAL HEADGEAR, LATEX FREE

## 2019-01-01 PROCEDURE — 93970 EXTREMITY STUDY: CPT

## 2019-01-01 PROCEDURE — 85027 COMPLETE CBC AUTOMATED: CPT | Performed by: NURSE PRACTITIONER

## 2019-01-01 PROCEDURE — 96375 TX/PRO/DX INJ NEW DRUG ADDON: CPT

## 2019-01-01 PROCEDURE — 85027 COMPLETE CBC AUTOMATED: CPT | Performed by: STUDENT IN AN ORGANIZED HEALTH CARE EDUCATION/TRAINING PROGRAM

## 2019-01-01 PROCEDURE — 43840 GSTRRPHY SUTR DUOL/GSTR ULCR: CPT | Performed by: SURGERY

## 2019-01-01 PROCEDURE — 27210339 ZZH CIRCUIT HUMIDITY W/CPAP BIP

## 2019-01-01 PROCEDURE — 83605 ASSAY OF LACTIC ACID: CPT

## 2019-01-01 PROCEDURE — 31500 INSERT EMERGENCY AIRWAY: CPT | Performed by: ANESTHESIOLOGY

## 2019-01-01 PROCEDURE — 84484 ASSAY OF TROPONIN QUANT: CPT | Performed by: PHYSICIAN ASSISTANT

## 2019-01-01 PROCEDURE — 99207 ZZC APP CREDIT; MD BILLING SHARED VISIT: CPT | Performed by: HOSPITALIST

## 2019-01-01 PROCEDURE — 80053 COMPREHEN METABOLIC PANEL: CPT | Performed by: INTERNAL MEDICINE

## 2019-01-01 PROCEDURE — 99238 HOSP IP/OBS DSCHRG MGMT 30/<: CPT | Performed by: NURSE PRACTITIONER

## 2019-01-01 PROCEDURE — 84300 ASSAY OF URINE SODIUM: CPT | Performed by: INTERNAL MEDICINE

## 2019-01-01 PROCEDURE — C9113 INJ PANTOPRAZOLE SODIUM, VIA: HCPCS | Performed by: SURGERY

## 2019-01-01 PROCEDURE — 25000125 ZZHC RX 250: Performed by: PHYSICIAN ASSISTANT

## 2019-01-01 PROCEDURE — 84443 ASSAY THYROID STIM HORMONE: CPT | Performed by: PHYSICIAN ASSISTANT

## 2019-01-01 PROCEDURE — 40000264 ECHOCARDIOGRAM COMPLETE

## 2019-01-01 PROCEDURE — 82803 BLOOD GASES ANY COMBINATION: CPT

## 2019-01-01 PROCEDURE — 99233 SBSQ HOSP IP/OBS HIGH 50: CPT | Performed by: INTERNAL MEDICINE

## 2019-01-01 PROCEDURE — 71000012 ZZH RECOVERY PHASE 1 LEVEL 1 FIRST HR: Performed by: SURGERY

## 2019-01-01 PROCEDURE — 81001 URINALYSIS AUTO W/SCOPE: CPT | Performed by: INTERNAL MEDICINE

## 2019-01-01 PROCEDURE — 27210794 ZZH OR GENERAL SUPPLY STERILE: Performed by: SURGERY

## 2019-01-01 PROCEDURE — 80048 BASIC METABOLIC PNL TOTAL CA: CPT | Performed by: NURSE PRACTITIONER

## 2019-01-01 PROCEDURE — 99225 ZZC SUBSEQUENT OBSERVATION CARE,LEVEL II: CPT | Performed by: PHYSICIAN ASSISTANT

## 2019-01-01 PROCEDURE — 25000125 ZZHC RX 250: Performed by: SURGERY

## 2019-01-01 PROCEDURE — 25800025 ZZH RX 258: Performed by: SURGERY

## 2019-01-01 RX ORDER — MORPHINE SULFATE 4 MG/ML
4 INJECTION, SOLUTION INTRAMUSCULAR; INTRAVENOUS ONCE
Status: COMPLETED | OUTPATIENT
Start: 2019-01-01 | End: 2019-01-01

## 2019-01-01 RX ORDER — MORPHINE SULFATE 2 MG/ML
2 INJECTION, SOLUTION INTRAMUSCULAR; INTRAVENOUS ONCE
Status: COMPLETED | OUTPATIENT
Start: 2019-01-01 | End: 2019-01-01

## 2019-01-01 RX ORDER — LIDOCAINE 4 G/G
1 PATCH TOPICAL EVERY 24 HOURS
COMMUNITY

## 2019-01-01 RX ORDER — PROPOFOL 10 MG/ML
INJECTION, EMULSION INTRAVENOUS
Status: COMPLETED
Start: 2019-01-01 | End: 2019-01-01

## 2019-01-01 RX ORDER — ACETAMINOPHEN 325 MG/1
650 TABLET ORAL EVERY 4 HOURS PRN
Status: DISCONTINUED | OUTPATIENT
Start: 2019-01-01 | End: 2019-01-01 | Stop reason: HOSPADM

## 2019-01-01 RX ORDER — HYDROMORPHONE HYDROCHLORIDE 1 MG/ML
.3-.5 INJECTION, SOLUTION INTRAMUSCULAR; INTRAVENOUS; SUBCUTANEOUS
Status: DISCONTINUED | OUTPATIENT
Start: 2019-01-01 | End: 2019-01-01 | Stop reason: HOSPADM

## 2019-01-01 RX ORDER — HYDROMORPHONE HYDROCHLORIDE 1 MG/ML
.3-.5 INJECTION, SOLUTION INTRAMUSCULAR; INTRAVENOUS; SUBCUTANEOUS
Status: DISCONTINUED | OUTPATIENT
Start: 2019-01-01 | End: 2019-01-01

## 2019-01-01 RX ORDER — CALCIUM CHLORIDE 100 MG/ML
1 INJECTION INTRAVENOUS; INTRAVENTRICULAR ONCE
Status: DISCONTINUED | OUTPATIENT
Start: 2019-01-01 | End: 2019-01-01

## 2019-01-01 RX ORDER — NAPROXEN 250 MG/1
250 TABLET ORAL 2 TIMES DAILY WITH MEALS
Status: DISCONTINUED | OUTPATIENT
Start: 2019-01-01 | End: 2019-01-01

## 2019-01-01 RX ORDER — MORPHINE SULFATE 100 MG/5ML
10 SOLUTION ORAL
Status: DISCONTINUED | OUTPATIENT
Start: 2019-01-01 | End: 2019-01-01 | Stop reason: HOSPADM

## 2019-01-01 RX ORDER — AMOXICILLIN 250 MG
2 CAPSULE ORAL 2 TIMES DAILY
Status: DISCONTINUED | OUTPATIENT
Start: 2019-01-01 | End: 2019-01-01 | Stop reason: HOSPADM

## 2019-01-01 RX ORDER — ONDANSETRON 2 MG/ML
4 INJECTION INTRAMUSCULAR; INTRAVENOUS EVERY 30 MIN PRN
Status: DISCONTINUED | OUTPATIENT
Start: 2019-01-01 | End: 2019-01-01 | Stop reason: HOSPADM

## 2019-01-01 RX ORDER — LOSARTAN POTASSIUM 50 MG/1
50 TABLET ORAL 2 TIMES DAILY
COMMUNITY

## 2019-01-01 RX ORDER — PREDNISONE 20 MG/1
TABLET ORAL
Qty: 10 TABLET | Refills: 0 | Status: SHIPPED | OUTPATIENT
Start: 2019-01-01 | End: 2019-01-01

## 2019-01-01 RX ORDER — TAMSULOSIN HYDROCHLORIDE 0.4 MG/1
0.4 CAPSULE ORAL DAILY
COMMUNITY

## 2019-01-01 RX ORDER — NALOXONE HYDROCHLORIDE 0.4 MG/ML
.1-.4 INJECTION, SOLUTION INTRAMUSCULAR; INTRAVENOUS; SUBCUTANEOUS
Status: DISCONTINUED | OUTPATIENT
Start: 2019-01-01 | End: 2019-01-01 | Stop reason: HOSPADM

## 2019-01-01 RX ORDER — PREDNISONE 20 MG/1
40 TABLET ORAL DAILY
Status: DISCONTINUED | OUTPATIENT
Start: 2019-01-01 | End: 2019-01-01

## 2019-01-01 RX ORDER — ONDANSETRON 4 MG/1
4 TABLET, ORALLY DISINTEGRATING ORAL EVERY 6 HOURS PRN
Status: DISCONTINUED | OUTPATIENT
Start: 2019-01-01 | End: 2019-01-01 | Stop reason: HOSPADM

## 2019-01-01 RX ORDER — NAPROXEN 250 MG/1
250 TABLET ORAL 2 TIMES DAILY WITH MEALS
Status: DISCONTINUED | OUTPATIENT
Start: 2019-01-01 | End: 2019-01-01 | Stop reason: HOSPADM

## 2019-01-01 RX ORDER — DILTIAZEM HYDROCHLORIDE 300 MG/1
300 CAPSULE, COATED, EXTENDED RELEASE ORAL DAILY
Status: DISCONTINUED | OUTPATIENT
Start: 2019-01-01 | End: 2019-01-01

## 2019-01-01 RX ORDER — CHLORHEXIDINE GLUCONATE ORAL RINSE 1.2 MG/ML
15 SOLUTION DENTAL 2 TIMES DAILY
Status: DISCONTINUED | OUTPATIENT
Start: 2019-01-01 | End: 2019-01-01 | Stop reason: CLARIF

## 2019-01-01 RX ORDER — ONDANSETRON 2 MG/ML
4 INJECTION INTRAMUSCULAR; INTRAVENOUS EVERY 6 HOURS PRN
Status: DISCONTINUED | OUTPATIENT
Start: 2019-01-01 | End: 2019-01-01

## 2019-01-01 RX ORDER — PIPERACILLIN SODIUM, TAZOBACTAM SODIUM 3; .375 G/15ML; G/15ML
3.38 INJECTION, POWDER, LYOPHILIZED, FOR SOLUTION INTRAVENOUS EVERY 6 HOURS
Status: DISCONTINUED | OUTPATIENT
Start: 2019-01-01 | End: 2019-01-01

## 2019-01-01 RX ORDER — PIPERACILLIN SODIUM, TAZOBACTAM SODIUM 3; .375 G/15ML; G/15ML
3.38 INJECTION, POWDER, LYOPHILIZED, FOR SOLUTION INTRAVENOUS
Status: COMPLETED | OUTPATIENT
Start: 2019-01-01 | End: 2019-01-01

## 2019-01-01 RX ORDER — LOSARTAN POTASSIUM 50 MG/1
50 TABLET ORAL 2 TIMES DAILY
Status: DISCONTINUED | OUTPATIENT
Start: 2019-01-01 | End: 2019-01-01 | Stop reason: HOSPADM

## 2019-01-01 RX ORDER — LABETALOL HYDROCHLORIDE 5 MG/ML
10-20 INJECTION, SOLUTION INTRAVENOUS EVERY 4 HOURS PRN
Status: DISCONTINUED | OUTPATIENT
Start: 2019-01-01 | End: 2019-01-01 | Stop reason: HOSPADM

## 2019-01-01 RX ORDER — METHYLPREDNISOLONE SODIUM SUCCINATE 125 MG/2ML
60 INJECTION, POWDER, LYOPHILIZED, FOR SOLUTION INTRAMUSCULAR; INTRAVENOUS EVERY 12 HOURS
Status: DISCONTINUED | OUTPATIENT
Start: 2019-01-01 | End: 2019-01-01

## 2019-01-01 RX ORDER — SIMVASTATIN 20 MG
20 TABLET ORAL AT BEDTIME
COMMUNITY

## 2019-01-01 RX ORDER — PROPOFOL 10 MG/ML
INJECTION, EMULSION INTRAVENOUS PRN
Status: DISCONTINUED | OUTPATIENT
Start: 2019-01-01 | End: 2019-01-01

## 2019-01-01 RX ORDER — HYDROCODONE BITARTRATE AND ACETAMINOPHEN 5; 325 MG/1; MG/1
1-2 TABLET ORAL EVERY 4 HOURS PRN
Status: DISCONTINUED | OUTPATIENT
Start: 2019-01-01 | End: 2019-01-01

## 2019-01-01 RX ORDER — PIPERACILLIN SODIUM, TAZOBACTAM SODIUM 4; .5 G/20ML; G/20ML
4.5 INJECTION, POWDER, LYOPHILIZED, FOR SOLUTION INTRAVENOUS EVERY 6 HOURS
Status: DISCONTINUED | OUTPATIENT
Start: 2019-01-01 | End: 2019-01-01

## 2019-01-01 RX ORDER — TAMSULOSIN HYDROCHLORIDE 0.4 MG/1
0.4 CAPSULE ORAL DAILY
Status: DISCONTINUED | OUTPATIENT
Start: 2019-01-01 | End: 2019-01-01

## 2019-01-01 RX ORDER — OXYCODONE HYDROCHLORIDE 5 MG/1
5 TABLET ORAL EVERY 6 HOURS PRN
Qty: 12 TABLET | Refills: 0 | Status: SHIPPED | OUTPATIENT
Start: 2019-01-01 | End: 2019-01-01

## 2019-01-01 RX ORDER — PIPERACILLIN SODIUM, TAZOBACTAM SODIUM 4; .5 G/20ML; G/20ML
4.5 INJECTION, POWDER, LYOPHILIZED, FOR SOLUTION INTRAVENOUS ONCE
Status: COMPLETED | OUTPATIENT
Start: 2019-01-01 | End: 2019-01-01

## 2019-01-01 RX ORDER — FUROSEMIDE 10 MG/ML
20 INJECTION INTRAMUSCULAR; INTRAVENOUS ONCE
Status: DISCONTINUED | OUTPATIENT
Start: 2019-01-01 | End: 2019-01-01

## 2019-01-01 RX ORDER — NALOXONE HYDROCHLORIDE 0.4 MG/ML
.1-.4 INJECTION, SOLUTION INTRAMUSCULAR; INTRAVENOUS; SUBCUTANEOUS
Status: DISCONTINUED | OUTPATIENT
Start: 2019-01-01 | End: 2019-01-01

## 2019-01-01 RX ORDER — DILTIAZEM HYDROCHLORIDE 300 MG/1
300 CAPSULE, COATED, EXTENDED RELEASE ORAL DAILY
COMMUNITY

## 2019-01-01 RX ORDER — MAGNESIUM SULFATE HEPTAHYDRATE 40 MG/ML
4 INJECTION, SOLUTION INTRAVENOUS EVERY 4 HOURS PRN
Status: DISCONTINUED | OUTPATIENT
Start: 2019-01-01 | End: 2019-01-01

## 2019-01-01 RX ORDER — METHYLPREDNISOLONE SODIUM SUCCINATE 125 MG/2ML
60 INJECTION, POWDER, LYOPHILIZED, FOR SOLUTION INTRAMUSCULAR; INTRAVENOUS DAILY
Status: DISCONTINUED | OUTPATIENT
Start: 2019-01-01 | End: 2019-01-01

## 2019-01-01 RX ORDER — HYDROMORPHONE HYDROCHLORIDE 1 MG/ML
0.3 INJECTION, SOLUTION INTRAMUSCULAR; INTRAVENOUS; SUBCUTANEOUS ONCE
Status: COMPLETED | OUTPATIENT
Start: 2019-01-01 | End: 2019-01-01

## 2019-01-01 RX ORDER — NAPROXEN SODIUM 220 MG
220 TABLET ORAL 2 TIMES DAILY WITH MEALS
Status: DISCONTINUED | OUTPATIENT
Start: 2019-01-01 | End: 2019-01-01

## 2019-01-01 RX ORDER — PROPOFOL 10 MG/ML
5-75 INJECTION, EMULSION INTRAVENOUS CONTINUOUS
Status: DISCONTINUED | OUTPATIENT
Start: 2019-01-01 | End: 2019-01-01

## 2019-01-01 RX ORDER — HYDRALAZINE HYDROCHLORIDE 20 MG/ML
10-20 INJECTION INTRAMUSCULAR; INTRAVENOUS EVERY 4 HOURS PRN
Status: DISCONTINUED | OUTPATIENT
Start: 2019-01-01 | End: 2019-01-01

## 2019-01-01 RX ORDER — LIDOCAINE HYDROCHLORIDE 20 MG/ML
INJECTION, SOLUTION INFILTRATION; PERINEURAL PRN
Status: DISCONTINUED | OUTPATIENT
Start: 2019-01-01 | End: 2019-01-01

## 2019-01-01 RX ORDER — FUROSEMIDE 10 MG/ML
40 INJECTION INTRAMUSCULAR; INTRAVENOUS ONCE
Status: COMPLETED | OUTPATIENT
Start: 2019-01-01 | End: 2019-01-01

## 2019-01-01 RX ORDER — ONDANSETRON 4 MG/1
4 TABLET, ORALLY DISINTEGRATING ORAL EVERY 30 MIN PRN
Status: DISCONTINUED | OUTPATIENT
Start: 2019-01-01 | End: 2019-01-01 | Stop reason: HOSPADM

## 2019-01-01 RX ORDER — AMOXICILLIN 250 MG
1 CAPSULE ORAL 2 TIMES DAILY PRN
Status: DISCONTINUED | OUTPATIENT
Start: 2019-01-01 | End: 2019-01-01 | Stop reason: HOSPADM

## 2019-01-01 RX ORDER — AMOXICILLIN 250 MG
2 CAPSULE ORAL 2 TIMES DAILY
DISCHARGE
Start: 2019-01-01

## 2019-01-01 RX ORDER — HYDROMORPHONE HYDROCHLORIDE 1 MG/ML
.3-.5 INJECTION, SOLUTION INTRAMUSCULAR; INTRAVENOUS; SUBCUTANEOUS EVERY 5 MIN PRN
Status: DISCONTINUED | OUTPATIENT
Start: 2019-01-01 | End: 2019-01-01 | Stop reason: HOSPADM

## 2019-01-01 RX ORDER — METOPROLOL TARTRATE 1 MG/ML
2.5 INJECTION, SOLUTION INTRAVENOUS EVERY 5 MIN PRN
Status: DISCONTINUED | OUTPATIENT
Start: 2019-01-01 | End: 2019-01-01

## 2019-01-01 RX ORDER — PIPERACILLIN SODIUM, TAZOBACTAM SODIUM 2; .25 G/10ML; G/10ML
2.25 INJECTION, POWDER, LYOPHILIZED, FOR SOLUTION INTRAVENOUS EVERY 6 HOURS
Status: DISCONTINUED | OUTPATIENT
Start: 2019-01-01 | End: 2019-01-01

## 2019-01-01 RX ORDER — POTASSIUM CL/LIDO/0.9 % NACL 10MEQ/0.1L
10 INTRAVENOUS SOLUTION, PIGGYBACK (ML) INTRAVENOUS
Status: DISCONTINUED | OUTPATIENT
Start: 2019-01-01 | End: 2019-01-01

## 2019-01-01 RX ORDER — OXYCODONE HYDROCHLORIDE 5 MG/1
5 TABLET ORAL EVERY 4 HOURS PRN
Status: DISCONTINUED | OUTPATIENT
Start: 2019-01-01 | End: 2019-01-01 | Stop reason: HOSPADM

## 2019-01-01 RX ORDER — POLYETHYLENE GLYCOL 3350 17 G/17G
17 POWDER, FOR SOLUTION ORAL 2 TIMES DAILY PRN
Status: DISCONTINUED | OUTPATIENT
Start: 2019-01-01 | End: 2019-01-01 | Stop reason: HOSPADM

## 2019-01-01 RX ORDER — LEVALBUTEROL INHALATION SOLUTION 0.31 MG/3ML
1 SOLUTION RESPIRATORY (INHALATION) EVERY 6 HOURS PRN
Qty: 225 ML | Refills: 0 | Status: SHIPPED | OUTPATIENT
Start: 2019-01-01 | End: 2019-01-01

## 2019-01-01 RX ORDER — HYDROMORPHONE HYDROCHLORIDE 1 MG/ML
INJECTION, SOLUTION INTRAMUSCULAR; INTRAVENOUS; SUBCUTANEOUS
Status: COMPLETED
Start: 2019-01-01 | End: 2019-01-01

## 2019-01-01 RX ORDER — HYDRALAZINE HYDROCHLORIDE 10 MG/1
10 TABLET, FILM COATED ORAL 3 TIMES DAILY
Status: DISCONTINUED | OUTPATIENT
Start: 2019-01-01 | End: 2019-01-01

## 2019-01-01 RX ORDER — NAPROXEN 250 MG/1
250 TABLET ORAL 2 TIMES DAILY PRN
Status: DISCONTINUED | OUTPATIENT
Start: 2019-01-01 | End: 2019-01-01

## 2019-01-01 RX ORDER — HYDRALAZINE HYDROCHLORIDE 20 MG/ML
10 INJECTION INTRAMUSCULAR; INTRAVENOUS EVERY 4 HOURS PRN
Status: DISCONTINUED | OUTPATIENT
Start: 2019-01-01 | End: 2019-01-01

## 2019-01-01 RX ORDER — NAPROXEN SODIUM 220 MG
220 TABLET ORAL 2 TIMES DAILY WITH MEALS
COMMUNITY

## 2019-01-01 RX ORDER — SIMVASTATIN 20 MG
20 TABLET ORAL AT BEDTIME
Status: DISCONTINUED | OUTPATIENT
Start: 2019-01-01 | End: 2019-01-01

## 2019-01-01 RX ORDER — POTASSIUM CHLORIDE 7.45 MG/ML
10 INJECTION INTRAVENOUS
Status: DISCONTINUED | OUTPATIENT
Start: 2019-01-01 | End: 2019-01-01

## 2019-01-01 RX ORDER — SODIUM CHLORIDE, SODIUM LACTATE, POTASSIUM CHLORIDE, CALCIUM CHLORIDE 600; 310; 30; 20 MG/100ML; MG/100ML; MG/100ML; MG/100ML
INJECTION, SOLUTION INTRAVENOUS CONTINUOUS PRN
Status: DISCONTINUED | OUTPATIENT
Start: 2019-01-01 | End: 2019-01-01

## 2019-01-01 RX ORDER — FENTANYL CITRATE 50 UG/ML
25-50 INJECTION, SOLUTION INTRAMUSCULAR; INTRAVENOUS
Status: DISCONTINUED | OUTPATIENT
Start: 2019-01-01 | End: 2019-01-01 | Stop reason: HOSPADM

## 2019-01-01 RX ORDER — VANCOMYCIN HYDROCHLORIDE 1 G/200ML
1000 INJECTION, SOLUTION INTRAVENOUS ONCE
Status: COMPLETED | OUTPATIENT
Start: 2019-01-01 | End: 2019-01-01

## 2019-01-01 RX ORDER — IPRATROPIUM BROMIDE AND ALBUTEROL SULFATE 2.5; .5 MG/3ML; MG/3ML
3 SOLUTION RESPIRATORY (INHALATION)
Status: COMPLETED | OUTPATIENT
Start: 2019-01-01 | End: 2019-01-01

## 2019-01-01 RX ORDER — ASPIRIN 81 MG/1
81 TABLET ORAL DAILY
COMMUNITY

## 2019-01-01 RX ORDER — LORAZEPAM 2 MG/ML
.5-1 INJECTION INTRAMUSCULAR EVERY 4 HOURS PRN
Status: DISCONTINUED | OUTPATIENT
Start: 2019-01-01 | End: 2019-01-01 | Stop reason: HOSPADM

## 2019-01-01 RX ORDER — TRAMADOL HYDROCHLORIDE 50 MG/1
50 TABLET ORAL EVERY 4 HOURS PRN
COMMUNITY

## 2019-01-01 RX ORDER — SODIUM CHLORIDE 9 MG/ML
INJECTION, SOLUTION INTRAVENOUS CONTINUOUS
Status: DISCONTINUED | OUTPATIENT
Start: 2019-01-01 | End: 2019-01-01

## 2019-01-01 RX ORDER — ACETAMINOPHEN 650 MG/1
650 SUPPOSITORY RECTAL EVERY 4 HOURS PRN
Status: DISCONTINUED | OUTPATIENT
Start: 2019-01-01 | End: 2019-01-01 | Stop reason: HOSPADM

## 2019-01-01 RX ORDER — ALBUMIN, HUMAN INJ 5% 5 %
SOLUTION INTRAVENOUS CONTINUOUS PRN
Status: DISCONTINUED | OUTPATIENT
Start: 2019-01-01 | End: 2019-01-01

## 2019-01-01 RX ORDER — LOSARTAN POTASSIUM 50 MG/1
50 TABLET ORAL 2 TIMES DAILY
Status: DISCONTINUED | OUTPATIENT
Start: 2019-01-01 | End: 2019-01-01

## 2019-01-01 RX ORDER — FENTANYL CITRATE 50 UG/ML
INJECTION, SOLUTION INTRAMUSCULAR; INTRAVENOUS PRN
Status: DISCONTINUED | OUTPATIENT
Start: 2019-01-01 | End: 2019-01-01

## 2019-01-01 RX ORDER — GLYCOPYRROLATE 0.2 MG/ML
0.2 INJECTION, SOLUTION INTRAMUSCULAR; INTRAVENOUS EVERY 4 HOURS PRN
Status: DISCONTINUED | OUTPATIENT
Start: 2019-01-01 | End: 2019-01-01 | Stop reason: HOSPADM

## 2019-01-01 RX ORDER — ONDANSETRON 2 MG/ML
4 INJECTION INTRAMUSCULAR; INTRAVENOUS EVERY 6 HOURS PRN
Status: DISCONTINUED | OUTPATIENT
Start: 2019-01-01 | End: 2019-01-01 | Stop reason: HOSPADM

## 2019-01-01 RX ORDER — ACETAMINOPHEN 650 MG/1
650 SUPPOSITORY RECTAL EVERY 4 HOURS PRN
Status: DISCONTINUED | OUTPATIENT
Start: 2019-01-01 | End: 2019-01-01

## 2019-01-01 RX ORDER — MORPHINE SULFATE 10 MG/5ML
10 SOLUTION ORAL
Status: DISCONTINUED | OUTPATIENT
Start: 2019-01-01 | End: 2019-01-01

## 2019-01-01 RX ORDER — ONDANSETRON 4 MG/1
4 TABLET, ORALLY DISINTEGRATING ORAL EVERY 6 HOURS PRN
Status: DISCONTINUED | OUTPATIENT
Start: 2019-01-01 | End: 2019-01-01

## 2019-01-01 RX ORDER — POTASSIUM CHLORIDE 1500 MG/1
20-40 TABLET, EXTENDED RELEASE ORAL
Status: DISCONTINUED | OUTPATIENT
Start: 2019-01-01 | End: 2019-01-01

## 2019-01-01 RX ORDER — METOPROLOL TARTRATE 1 MG/ML
2.5 INJECTION, SOLUTION INTRAVENOUS EVERY 6 HOURS
Status: DISCONTINUED | OUTPATIENT
Start: 2019-01-01 | End: 2019-01-01

## 2019-01-01 RX ORDER — CALCITONIN SALMON 200 [IU]/.09ML
1 SPRAY, METERED NASAL DAILY
COMMUNITY

## 2019-01-01 RX ORDER — LIDOCAINE 40 MG/G
CREAM TOPICAL
Status: DISCONTINUED | OUTPATIENT
Start: 2019-01-01 | End: 2019-01-01

## 2019-01-01 RX ORDER — SIMVASTATIN 10 MG
20 TABLET ORAL AT BEDTIME
Status: DISCONTINUED | OUTPATIENT
Start: 2019-01-01 | End: 2019-01-01 | Stop reason: HOSPADM

## 2019-01-01 RX ORDER — MAGNESIUM SULFATE HEPTAHYDRATE 40 MG/ML
2 INJECTION, SOLUTION INTRAVENOUS ONCE
Status: COMPLETED | OUTPATIENT
Start: 2019-01-01 | End: 2019-01-01

## 2019-01-01 RX ORDER — LABETALOL HYDROCHLORIDE 5 MG/ML
10 INJECTION, SOLUTION INTRAVENOUS
Status: DISCONTINUED | OUTPATIENT
Start: 2019-01-01 | End: 2019-01-01 | Stop reason: HOSPADM

## 2019-01-01 RX ORDER — HYDROMORPHONE HYDROCHLORIDE 1 MG/ML
0.2 INJECTION, SOLUTION INTRAMUSCULAR; INTRAVENOUS; SUBCUTANEOUS
Status: DISCONTINUED | OUTPATIENT
Start: 2019-01-01 | End: 2019-01-01

## 2019-01-01 RX ORDER — DILTIAZEM HYDROCHLORIDE 300 MG/1
300 CAPSULE, COATED, EXTENDED RELEASE ORAL DAILY
Status: DISCONTINUED | OUTPATIENT
Start: 2019-01-01 | End: 2019-01-01 | Stop reason: CLARIF

## 2019-01-01 RX ORDER — IOPAMIDOL 755 MG/ML
52 INJECTION, SOLUTION INTRAVASCULAR ONCE
Status: COMPLETED | OUTPATIENT
Start: 2019-01-01 | End: 2019-01-01

## 2019-01-01 RX ORDER — ACETAMINOPHEN 500 MG
1000 TABLET ORAL 3 TIMES DAILY
Status: DISCONTINUED | OUTPATIENT
Start: 2019-01-01 | End: 2019-01-01 | Stop reason: HOSPADM

## 2019-01-01 RX ORDER — METOPROLOL TARTRATE 1 MG/ML
2.5 INJECTION, SOLUTION INTRAVENOUS EVERY 4 HOURS PRN
Status: DISCONTINUED | OUTPATIENT
Start: 2019-01-01 | End: 2019-01-01

## 2019-01-01 RX ORDER — POTASSIUM CHLORIDE 29.8 MG/ML
20 INJECTION INTRAVENOUS
Status: DISCONTINUED | OUTPATIENT
Start: 2019-01-01 | End: 2019-01-01

## 2019-01-01 RX ORDER — LABETALOL HYDROCHLORIDE 5 MG/ML
5-10 INJECTION, SOLUTION INTRAVENOUS EVERY 10 MIN PRN
Status: DISCONTINUED | OUTPATIENT
Start: 2019-01-01 | End: 2019-01-01

## 2019-01-01 RX ORDER — NITROGLYCERIN 0.4 MG/1
0.4 TABLET SUBLINGUAL EVERY 5 MIN PRN
Status: DISCONTINUED | OUTPATIENT
Start: 2019-01-01 | End: 2019-01-01 | Stop reason: HOSPADM

## 2019-01-01 RX ORDER — ONDANSETRON 4 MG/1
4 TABLET, ORALLY DISINTEGRATING ORAL EVERY 6 HOURS PRN
DISCHARGE
Start: 2019-01-01

## 2019-01-01 RX ORDER — DEXTROSE MONOHYDRATE 50 MG/ML
INJECTION, SOLUTION INTRAVENOUS CONTINUOUS
Status: DISCONTINUED | OUTPATIENT
Start: 2019-01-01 | End: 2019-01-01

## 2019-01-01 RX ORDER — ATROPINE SULFATE 10 MG/ML
2-4 SOLUTION/ DROPS OPHTHALMIC
Status: DISCONTINUED | OUTPATIENT
Start: 2019-01-01 | End: 2019-01-01 | Stop reason: HOSPADM

## 2019-01-01 RX ORDER — LEVALBUTEROL 1.25 MG/.5ML
0.63 SOLUTION, CONCENTRATE RESPIRATORY (INHALATION) EVERY 6 HOURS PRN
Status: DISCONTINUED | OUTPATIENT
Start: 2019-01-01 | End: 2019-01-01 | Stop reason: HOSPADM

## 2019-01-01 RX ORDER — VANCOMYCIN HYDROCHLORIDE 1 G/200ML
1000 INJECTION, SOLUTION INTRAVENOUS EVERY 24 HOURS
Status: DISCONTINUED | OUTPATIENT
Start: 2019-01-01 | End: 2019-01-01

## 2019-01-01 RX ORDER — LEVOFLOXACIN 5 MG/ML
500 INJECTION, SOLUTION INTRAVENOUS EVERY 24 HOURS
Status: DISCONTINUED | OUTPATIENT
Start: 2019-01-01 | End: 2019-01-01

## 2019-01-01 RX ORDER — METHYLPREDNISOLONE SODIUM SUCCINATE 40 MG/ML
40 INJECTION, POWDER, LYOPHILIZED, FOR SOLUTION INTRAMUSCULAR; INTRAVENOUS DAILY
Status: DISCONTINUED | OUTPATIENT
Start: 2019-01-01 | End: 2019-01-01

## 2019-01-01 RX ORDER — ASPIRIN 81 MG/1
81 TABLET ORAL DAILY
Status: DISCONTINUED | OUTPATIENT
Start: 2019-01-01 | End: 2019-01-01

## 2019-01-01 RX ORDER — ACETAMINOPHEN 325 MG/1
650 TABLET ORAL EVERY 4 HOURS PRN
Status: DISCONTINUED | OUTPATIENT
Start: 2019-01-01 | End: 2019-01-01

## 2019-01-01 RX ORDER — MAGNESIUM HYDROXIDE 1200 MG/15ML
LIQUID ORAL PRN
Status: DISCONTINUED | OUTPATIENT
Start: 2019-01-01 | End: 2019-01-01 | Stop reason: HOSPADM

## 2019-01-01 RX ORDER — ONDANSETRON 2 MG/ML
INJECTION INTRAMUSCULAR; INTRAVENOUS PRN
Status: DISCONTINUED | OUTPATIENT
Start: 2019-01-01 | End: 2019-01-01

## 2019-01-01 RX ORDER — METHYLPREDNISOLONE SODIUM SUCCINATE 125 MG/2ML
125 INJECTION, POWDER, LYOPHILIZED, FOR SOLUTION INTRAMUSCULAR; INTRAVENOUS ONCE
Status: COMPLETED | OUTPATIENT
Start: 2019-01-01 | End: 2019-01-01

## 2019-01-01 RX ORDER — HEPARIN SODIUM 5000 [USP'U]/.5ML
5000 INJECTION, SOLUTION INTRAVENOUS; SUBCUTANEOUS EVERY 12 HOURS
Status: DISCONTINUED | OUTPATIENT
Start: 2019-01-01 | End: 2019-01-01

## 2019-01-01 RX ORDER — MORPHINE SULFATE 2 MG/ML
1 INJECTION, SOLUTION INTRAMUSCULAR; INTRAVENOUS
Status: DISCONTINUED | OUTPATIENT
Start: 2019-01-01 | End: 2019-01-01

## 2019-01-01 RX ORDER — METHYLPREDNISOLONE SODIUM SUCCINATE 40 MG/ML
40 INJECTION, POWDER, LYOPHILIZED, FOR SOLUTION INTRAMUSCULAR; INTRAVENOUS ONCE
Status: COMPLETED | OUTPATIENT
Start: 2019-01-01 | End: 2019-01-01

## 2019-01-01 RX ORDER — ACETAMINOPHEN 500 MG
1000 TABLET ORAL 3 TIMES DAILY
DISCHARGE
Start: 2019-01-01

## 2019-01-01 RX ORDER — TRAMADOL HYDROCHLORIDE 50 MG/1
50 TABLET ORAL EVERY 4 HOURS PRN
Status: DISCONTINUED | OUTPATIENT
Start: 2019-01-01 | End: 2019-01-01

## 2019-01-01 RX ORDER — LEVALBUTEROL 1.25 MG/.5ML
1.25 SOLUTION, CONCENTRATE RESPIRATORY (INHALATION) ONCE
Status: COMPLETED | OUTPATIENT
Start: 2019-01-01 | End: 2019-01-01

## 2019-01-01 RX ORDER — ACETYLCYSTEINE 200 MG/ML
2 SOLUTION ORAL; RESPIRATORY (INHALATION) ONCE
Status: COMPLETED | OUTPATIENT
Start: 2019-01-01 | End: 2019-01-01

## 2019-01-01 RX ORDER — METHYLPREDNISOLONE SODIUM SUCCINATE 125 MG/2ML
80 INJECTION, POWDER, LYOPHILIZED, FOR SOLUTION INTRAMUSCULAR; INTRAVENOUS ONCE
Status: COMPLETED | OUTPATIENT
Start: 2019-01-01 | End: 2019-01-01

## 2019-01-01 RX ORDER — SODIUM CHLORIDE, SODIUM LACTATE, POTASSIUM CHLORIDE, CALCIUM CHLORIDE 600; 310; 30; 20 MG/100ML; MG/100ML; MG/100ML; MG/100ML
INJECTION, SOLUTION INTRAVENOUS CONTINUOUS
Status: DISCONTINUED | OUTPATIENT
Start: 2019-01-01 | End: 2019-01-01 | Stop reason: HOSPADM

## 2019-01-01 RX ORDER — POTASSIUM CHLORIDE 1.5 G/1.58G
20-40 POWDER, FOR SOLUTION ORAL
Status: DISCONTINUED | OUTPATIENT
Start: 2019-01-01 | End: 2019-01-01

## 2019-01-01 RX ORDER — LIDOCAINE 40 MG/G
CREAM TOPICAL
Status: DISCONTINUED | OUTPATIENT
Start: 2019-01-01 | End: 2019-01-01 | Stop reason: HOSPADM

## 2019-01-01 RX ORDER — AMOXICILLIN 250 MG
2 CAPSULE ORAL 2 TIMES DAILY PRN
Status: DISCONTINUED | OUTPATIENT
Start: 2019-01-01 | End: 2019-01-01 | Stop reason: HOSPADM

## 2019-01-01 RX ORDER — IPRATROPIUM BROMIDE AND ALBUTEROL SULFATE 2.5; .5 MG/3ML; MG/3ML
3 SOLUTION RESPIRATORY (INHALATION)
Status: DISCONTINUED | OUTPATIENT
Start: 2019-01-01 | End: 2019-01-01

## 2019-01-01 RX ORDER — HYDRALAZINE HYDROCHLORIDE 10 MG/1
10 TABLET, FILM COATED ORAL 3 TIMES DAILY
COMMUNITY

## 2019-01-01 RX ORDER — HYDROMORPHONE HYDROCHLORIDE 1 MG/ML
0.2 INJECTION, SOLUTION INTRAMUSCULAR; INTRAVENOUS; SUBCUTANEOUS EVERY 30 MIN PRN
Status: DISCONTINUED | OUTPATIENT
Start: 2019-01-01 | End: 2019-01-01

## 2019-01-01 RX ORDER — ALBUTEROL SULFATE 0.83 MG/ML
2.5 SOLUTION RESPIRATORY (INHALATION) EVERY 4 HOURS PRN
Status: DISCONTINUED | OUTPATIENT
Start: 2019-01-01 | End: 2019-01-01 | Stop reason: HOSPADM

## 2019-01-01 RX ORDER — PANTOPRAZOLE SODIUM 40 MG/1
40 TABLET, DELAYED RELEASE ORAL
Status: DISCONTINUED | OUTPATIENT
Start: 2019-01-01 | End: 2019-01-01 | Stop reason: HOSPADM

## 2019-01-01 RX ORDER — IOPAMIDOL 755 MG/ML
54 INJECTION, SOLUTION INTRAVASCULAR ONCE
Status: COMPLETED | OUTPATIENT
Start: 2019-01-01 | End: 2019-01-01

## 2019-01-01 RX ADMIN — HEPARIN SODIUM 5000 UNITS: 5000 INJECTION, SOLUTION INTRAVENOUS; SUBCUTANEOUS at 17:53

## 2019-01-01 RX ADMIN — FUROSEMIDE 40 MG: 10 INJECTION, SOLUTION INTRAVENOUS at 18:25

## 2019-01-01 RX ADMIN — NAPROXEN 250 MG: 250 TABLET ORAL at 20:25

## 2019-01-01 RX ADMIN — HYDROMORPHONE HYDROCHLORIDE 0.5 MG: 1 INJECTION, SOLUTION INTRAMUSCULAR; INTRAVENOUS; SUBCUTANEOUS at 11:46

## 2019-01-01 RX ADMIN — LOSARTAN POTASSIUM 50 MG: 50 TABLET ORAL at 20:25

## 2019-01-01 RX ADMIN — SODIUM CHLORIDE: 9 INJECTION, SOLUTION INTRAVENOUS at 09:28

## 2019-01-01 RX ADMIN — MORPHINE SULFATE 10 MG: 10 SOLUTION ORAL at 14:45

## 2019-01-01 RX ADMIN — ACETAMINOPHEN 1000 MG: 500 TABLET, FILM COATED ORAL at 20:59

## 2019-01-01 RX ADMIN — PIPERACILLIN AND TAZOBACTAM 3.38 G: 3; .375 INJECTION, POWDER, LYOPHILIZED, FOR SOLUTION INTRAVENOUS at 17:01

## 2019-01-01 RX ADMIN — SODIUM CHLORIDE 500 ML: 9 INJECTION, SOLUTION INTRAVENOUS at 17:47

## 2019-01-01 RX ADMIN — NAPROXEN 250 MG: 250 TABLET ORAL at 15:01

## 2019-01-01 RX ADMIN — MORPHINE SULFATE 10 MG: 10 SOLUTION ORAL at 06:12

## 2019-01-01 RX ADMIN — HYDROMORPHONE HYDROCHLORIDE 0.5 MG: 1 INJECTION, SOLUTION INTRAMUSCULAR; INTRAVENOUS; SUBCUTANEOUS at 08:43

## 2019-01-01 RX ADMIN — ONDANSETRON 4 MG: 2 INJECTION INTRAMUSCULAR; INTRAVENOUS at 04:48

## 2019-01-01 RX ADMIN — OXYCODONE HYDROCHLORIDE 5 MG: 5 TABLET ORAL at 00:14

## 2019-01-01 RX ADMIN — MORPHINE SULFATE 10 MG: 100 SOLUTION ORAL at 08:16

## 2019-01-01 RX ADMIN — MORPHINE SULFATE 10 MG: 10 SOLUTION ORAL at 19:58

## 2019-01-01 RX ADMIN — HYDROMORPHONE HYDROCHLORIDE 0.5 MG: 1 INJECTION, SOLUTION INTRAMUSCULAR; INTRAVENOUS; SUBCUTANEOUS at 15:43

## 2019-01-01 RX ADMIN — MORPHINE SULFATE 10 MG: 100 SOLUTION ORAL at 12:04

## 2019-01-01 RX ADMIN — SUCCINYLCHOLINE CHLORIDE 100 MG: 20 INJECTION, SOLUTION INTRAMUSCULAR; INTRAVENOUS; PARENTERAL at 06:45

## 2019-01-01 RX ADMIN — PIPERACILLIN SODIUM,TAZOBACTAM SODIUM 3.38 G: 3; .375 INJECTION, POWDER, FOR SOLUTION INTRAVENOUS at 09:42

## 2019-01-01 RX ADMIN — MORPHINE SULFATE 10 MG: 100 SOLUTION ORAL at 18:46

## 2019-01-01 RX ADMIN — IOPAMIDOL 52 ML: 755 INJECTION, SOLUTION INTRAVENOUS at 01:15

## 2019-01-01 RX ADMIN — SODIUM CHLORIDE, POTASSIUM CHLORIDE, SODIUM LACTATE AND CALCIUM CHLORIDE: 600; 310; 30; 20 INJECTION, SOLUTION INTRAVENOUS at 04:10

## 2019-01-01 RX ADMIN — DEXTROSE MONOHYDRATE: 50 INJECTION, SOLUTION INTRAVENOUS at 02:10

## 2019-01-01 RX ADMIN — DILTIAZEM HYDROCHLORIDE 300 MG: 180 CAPSULE, EXTENDED RELEASE ORAL at 15:01

## 2019-01-01 RX ADMIN — MORPHINE SULFATE 4 MG: 4 INJECTION INTRAVENOUS at 20:58

## 2019-01-01 RX ADMIN — SIMVASTATIN 20 MG: 10 TABLET, FILM COATED ORAL at 21:00

## 2019-01-01 RX ADMIN — PROPOFOL 20 MCG/KG/MIN: 10 INJECTION, EMULSION INTRAVENOUS at 18:43

## 2019-01-01 RX ADMIN — NAPROXEN 250 MG: 250 TABLET ORAL at 17:43

## 2019-01-01 RX ADMIN — MORPHINE SULFATE 2 MG: 2 INJECTION, SOLUTION INTRAMUSCULAR; INTRAVENOUS at 11:38

## 2019-01-01 RX ADMIN — METHYLPREDNISOLONE SODIUM SUCCINATE 62.5 MG: 125 INJECTION, POWDER, FOR SOLUTION INTRAMUSCULAR; INTRAVENOUS at 04:00

## 2019-01-01 RX ADMIN — HUMAN ALBUMIN MICROSPHERES AND PERFLUTREN 3 ML: 10; .22 INJECTION, SOLUTION INTRAVENOUS at 09:30

## 2019-01-01 RX ADMIN — SODIUM CHLORIDE, POTASSIUM CHLORIDE, SODIUM LACTATE AND CALCIUM CHLORIDE: 600; 310; 30; 20 INJECTION, SOLUTION INTRAVENOUS at 03:50

## 2019-01-01 RX ADMIN — DEXTROSE MONOHYDRATE: 50 INJECTION, SOLUTION INTRAVENOUS at 17:18

## 2019-01-01 RX ADMIN — OXYCODONE HYDROCHLORIDE 5 MG: 5 TABLET ORAL at 06:29

## 2019-01-01 RX ADMIN — HYDROMORPHONE HYDROCHLORIDE 0.5 MG: 1 INJECTION, SOLUTION INTRAMUSCULAR; INTRAVENOUS; SUBCUTANEOUS at 20:53

## 2019-01-01 RX ADMIN — POTASSIUM CHLORIDE 20 MEQ: 400 INJECTION, SOLUTION INTRAVENOUS at 07:37

## 2019-01-01 RX ADMIN — SENNOSIDES AND DOCUSATE SODIUM 2 TABLET: 8.6; 5 TABLET ORAL at 07:54

## 2019-01-01 RX ADMIN — PIPERACILLIN AND TAZOBACTAM 3.38 G: 3; .375 INJECTION, POWDER, LYOPHILIZED, FOR SOLUTION INTRAVENOUS at 21:45

## 2019-01-01 RX ADMIN — MAGNESIUM SULFATE HEPTAHYDRATE 2 G: 40 INJECTION, SOLUTION INTRAVENOUS at 16:23

## 2019-01-01 RX ADMIN — HYDRALAZINE HYDROCHLORIDE 20 MG: 20 INJECTION INTRAMUSCULAR; INTRAVENOUS at 08:44

## 2019-01-01 RX ADMIN — PIPERACILLIN SODIUM AND TAZOBACTAM SODIUM 4.5 G: 4; .5 INJECTION, POWDER, LYOPHILIZED, FOR SOLUTION INTRAVENOUS at 22:30

## 2019-01-01 RX ADMIN — ACETAMINOPHEN 1000 MG: 500 TABLET, FILM COATED ORAL at 13:58

## 2019-01-01 RX ADMIN — ONDANSETRON 4 MG: 2 INJECTION INTRAMUSCULAR; INTRAVENOUS at 20:47

## 2019-01-01 RX ADMIN — DEXTROSE MONOHYDRATE: 50 INJECTION, SOLUTION INTRAVENOUS at 10:15

## 2019-01-01 RX ADMIN — IOPAMIDOL 54 ML: 755 INJECTION, SOLUTION INTRAVENOUS at 19:56

## 2019-01-01 RX ADMIN — SODIUM CHLORIDE 500 ML: 9 INJECTION, SOLUTION INTRAVENOUS at 13:47

## 2019-01-01 RX ADMIN — IPRATROPIUM BROMIDE AND ALBUTEROL SULFATE 3 ML: .5; 3 SOLUTION RESPIRATORY (INHALATION) at 07:14

## 2019-01-01 RX ADMIN — PIPERACILLIN SODIUM,TAZOBACTAM SODIUM 3.38 G: 3; .375 INJECTION, POWDER, FOR SOLUTION INTRAVENOUS at 21:30

## 2019-01-01 RX ADMIN — METOPROLOL TARTRATE 2.5 MG: 5 INJECTION INTRAVENOUS at 20:45

## 2019-01-01 RX ADMIN — IPRATROPIUM BROMIDE AND ALBUTEROL SULFATE 3 ML: .5; 3 SOLUTION RESPIRATORY (INHALATION) at 11:16

## 2019-01-01 RX ADMIN — SENNOSIDES AND DOCUSATE SODIUM 2 TABLET: 8.6; 5 TABLET ORAL at 20:59

## 2019-01-01 RX ADMIN — HYDROCODONE BITARTRATE AND ACETAMINOPHEN 1 TABLET: 5; 325 TABLET ORAL at 07:54

## 2019-01-01 RX ADMIN — CHLORHEXIDINE GLUCONATE 15 ML: 1.2 RINSE ORAL at 08:11

## 2019-01-01 RX ADMIN — PIPERACILLIN AND TAZOBACTAM 3.38 G: 3; .375 INJECTION, POWDER, LYOPHILIZED, FOR SOLUTION INTRAVENOUS at 10:34

## 2019-01-01 RX ADMIN — HYDROCODONE BITARTRATE AND ACETAMINOPHEN 1 TABLET: 5; 325 TABLET ORAL at 09:33

## 2019-01-01 RX ADMIN — PROPOFOL 40 MG: 10 INJECTION, EMULSION INTRAVENOUS at 06:45

## 2019-01-01 RX ADMIN — IPRATROPIUM BROMIDE AND ALBUTEROL SULFATE 3 ML: .5; 3 SOLUTION RESPIRATORY (INHALATION) at 07:34

## 2019-01-01 RX ADMIN — MORPHINE SULFATE 10 MG: 100 SOLUTION ORAL at 00:10

## 2019-01-01 RX ADMIN — MAGNESIUM SULFATE HEPTAHYDRATE 1 G: 500 INJECTION, SOLUTION INTRAMUSCULAR; INTRAVENOUS at 16:38

## 2019-01-01 RX ADMIN — IPRATROPIUM BROMIDE AND ALBUTEROL SULFATE 3 ML: .5; 3 SOLUTION RESPIRATORY (INHALATION) at 16:39

## 2019-01-01 RX ADMIN — METOPROLOL TARTRATE 2.5 MG: 5 INJECTION INTRAVENOUS at 01:04

## 2019-01-01 RX ADMIN — PIPERACILLIN AND TAZOBACTAM 3.38 G: 3; .375 INJECTION, POWDER, LYOPHILIZED, FOR SOLUTION INTRAVENOUS at 11:25

## 2019-01-01 RX ADMIN — HEPARIN SODIUM 5000 UNITS: 5000 INJECTION, SOLUTION INTRAVENOUS; SUBCUTANEOUS at 05:56

## 2019-01-01 RX ADMIN — NAPROXEN 250 MG: 250 TABLET ORAL at 08:40

## 2019-01-01 RX ADMIN — DILTIAZEM HYDROCHLORIDE 300 MG: 180 CAPSULE, EXTENDED RELEASE ORAL at 07:51

## 2019-01-01 RX ADMIN — Medication 1 MG: at 00:36

## 2019-01-01 RX ADMIN — CALCIUM CHLORIDE 1 G: 100 INJECTION, SOLUTION INTRAVENOUS at 15:36

## 2019-01-01 RX ADMIN — ACETAMINOPHEN 1000 MG: 500 TABLET, FILM COATED ORAL at 14:41

## 2019-01-01 RX ADMIN — METHYLPREDNISOLONE SODIUM SUCCINATE 40 MG: 40 INJECTION, POWDER, FOR SOLUTION INTRAMUSCULAR; INTRAVENOUS at 16:50

## 2019-01-01 RX ADMIN — PANTOPRAZOLE SODIUM 40 MG: 40 INJECTION, POWDER, FOR SOLUTION INTRAVENOUS at 21:30

## 2019-01-01 RX ADMIN — PROPOFOL 25 MCG/KG/MIN: 10 INJECTION, EMULSION INTRAVENOUS at 05:56

## 2019-01-01 RX ADMIN — MORPHINE SULFATE 4 MG: 4 INJECTION INTRAVENOUS at 12:29

## 2019-01-01 RX ADMIN — FLUTICASONE FUROATE AND VILANTEROL TRIFENATATE 1 PUFF: 200; 25 POWDER RESPIRATORY (INHALATION) at 08:43

## 2019-01-01 RX ADMIN — PREDNISONE 40 MG: 20 TABLET ORAL at 08:34

## 2019-01-01 RX ADMIN — OXYCODONE HYDROCHLORIDE 5 MG: 5 TABLET ORAL at 10:53

## 2019-01-01 RX ADMIN — METOPROLOL TARTRATE 2.5 MG: 5 INJECTION INTRAVENOUS at 04:01

## 2019-01-01 RX ADMIN — PANTOPRAZOLE SODIUM 40 MG: 40 INJECTION, POWDER, FOR SOLUTION INTRAVENOUS at 08:42

## 2019-01-01 RX ADMIN — METOPROLOL TARTRATE 2.5 MG: 5 INJECTION INTRAVENOUS at 13:13

## 2019-01-01 RX ADMIN — PIPERACILLIN SODIUM,TAZOBACTAM SODIUM 2.25 G: 2; .25 INJECTION, POWDER, FOR SOLUTION INTRAVENOUS at 03:59

## 2019-01-01 RX ADMIN — MORPHINE SULFATE 10 MG: 100 SOLUTION ORAL at 05:58

## 2019-01-01 RX ADMIN — MORPHINE SULFATE 10 MG: 100 SOLUTION ORAL at 15:27

## 2019-01-01 RX ADMIN — OXYCODONE HYDROCHLORIDE 5 MG: 5 TABLET ORAL at 14:41

## 2019-01-01 RX ADMIN — HYDRALAZINE HYDROCHLORIDE 10 MG: 10 TABLET ORAL at 21:54

## 2019-01-01 RX ADMIN — PIPERACILLIN AND TAZOBACTAM 3.38 G: 3; .375 INJECTION, POWDER, LYOPHILIZED, FOR SOLUTION INTRAVENOUS at 21:28

## 2019-01-01 RX ADMIN — ACETYLCYSTEINE 2 ML: 200 SOLUTION ORAL; RESPIRATORY (INHALATION) at 11:16

## 2019-01-01 RX ADMIN — HYDRALAZINE HYDROCHLORIDE 20 MG: 20 INJECTION INTRAMUSCULAR; INTRAVENOUS at 07:31

## 2019-01-01 RX ADMIN — IPRATROPIUM BROMIDE AND ALBUTEROL SULFATE 3 ML: .5; 3 SOLUTION RESPIRATORY (INHALATION) at 15:38

## 2019-01-01 RX ADMIN — SIMVASTATIN 20 MG: 20 TABLET, FILM COATED ORAL at 21:28

## 2019-01-01 RX ADMIN — HYDRALAZINE HYDROCHLORIDE 10 MG: 10 TABLET ORAL at 17:16

## 2019-01-01 RX ADMIN — HYDROMORPHONE HYDROCHLORIDE 0.5 MG: 1 INJECTION, SOLUTION INTRAMUSCULAR; INTRAVENOUS; SUBCUTANEOUS at 17:27

## 2019-01-01 RX ADMIN — HYDROMORPHONE HYDROCHLORIDE 0.5 MG: 1 INJECTION, SOLUTION INTRAMUSCULAR; INTRAVENOUS; SUBCUTANEOUS at 02:10

## 2019-01-01 RX ADMIN — HEPARIN SODIUM 5000 UNITS: 5000 INJECTION, SOLUTION INTRAVENOUS; SUBCUTANEOUS at 06:01

## 2019-01-01 RX ADMIN — MORPHINE SULFATE 10 MG: 100 SOLUTION ORAL at 14:22

## 2019-01-01 RX ADMIN — SODIUM CHLORIDE: 9 INJECTION, SOLUTION INTRAVENOUS at 13:38

## 2019-01-01 RX ADMIN — NAPROXEN 250 MG: 250 TABLET ORAL at 07:52

## 2019-01-01 RX ADMIN — HYDROMORPHONE HYDROCHLORIDE 0.3 MG: 1 INJECTION, SOLUTION INTRAMUSCULAR; INTRAVENOUS; SUBCUTANEOUS at 01:00

## 2019-01-01 RX ADMIN — METHYLPREDNISOLONE SODIUM SUCCINATE 62.5 MG: 125 INJECTION, POWDER, FOR SOLUTION INTRAMUSCULAR; INTRAVENOUS at 03:58

## 2019-01-01 RX ADMIN — TAMSULOSIN HYDROCHLORIDE 0.4 MG: 0.4 CAPSULE ORAL at 15:02

## 2019-01-01 RX ADMIN — PHENYLEPHRINE HYDROCHLORIDE 0.25 MCG/KG/MIN: 10 INJECTION INTRAVENOUS at 04:22

## 2019-01-01 RX ADMIN — SODIUM CHLORIDE, POTASSIUM CHLORIDE, SODIUM LACTATE AND CALCIUM CHLORIDE 500 ML: 600; 310; 30; 20 INJECTION, SOLUTION INTRAVENOUS at 20:17

## 2019-01-01 RX ADMIN — AMIODARONE HYDROCHLORIDE 1 MG/MIN: 50 INJECTION, SOLUTION INTRAVENOUS at 21:31

## 2019-01-01 RX ADMIN — PROPOFOL 50 MG: 10 INJECTION, EMULSION INTRAVENOUS at 03:58

## 2019-01-01 RX ADMIN — HYDROMORPHONE HYDROCHLORIDE 0.5 MG: 1 INJECTION, SOLUTION INTRAMUSCULAR; INTRAVENOUS; SUBCUTANEOUS at 11:38

## 2019-01-01 RX ADMIN — MORPHINE SULFATE 10 MG: 100 SOLUTION ORAL at 21:19

## 2019-01-01 RX ADMIN — MORPHINE SULFATE 10 MG: 100 SOLUTION ORAL at 09:23

## 2019-01-01 RX ADMIN — NALOXONE HYDROCHLORIDE 40 MCG: 0.4 INJECTION, SOLUTION INTRAMUSCULAR; INTRAVENOUS; SUBCUTANEOUS at 05:22

## 2019-01-01 RX ADMIN — ROCURONIUM BROMIDE 30 MG: 10 INJECTION INTRAVENOUS at 04:14

## 2019-01-01 RX ADMIN — IPRATROPIUM BROMIDE AND ALBUTEROL SULFATE 3 ML: .5; 3 SOLUTION RESPIRATORY (INHALATION) at 19:47

## 2019-01-01 RX ADMIN — IPRATROPIUM BROMIDE AND ALBUTEROL SULFATE 3 ML: .5; 3 SOLUTION RESPIRATORY (INHALATION) at 16:02

## 2019-01-01 RX ADMIN — PANTOPRAZOLE SODIUM 40 MG: 40 INJECTION, POWDER, FOR SOLUTION INTRAVENOUS at 08:11

## 2019-01-01 RX ADMIN — METHYLPREDNISOLONE SODIUM SUCCINATE 62.5 MG: 125 INJECTION, POWDER, FOR SOLUTION INTRAMUSCULAR; INTRAVENOUS at 04:14

## 2019-01-01 RX ADMIN — CHLORHEXIDINE GLUCONATE 15 ML: 1.2 RINSE ORAL at 20:59

## 2019-01-01 RX ADMIN — ASPIRIN 81 MG: 81 TABLET, DELAYED RELEASE ORAL at 15:01

## 2019-01-01 RX ADMIN — VANCOMYCIN HYDROCHLORIDE 1000 MG: 1 INJECTION, SOLUTION INTRAVENOUS at 23:41

## 2019-01-01 RX ADMIN — HYDROMORPHONE HYDROCHLORIDE 0.5 MG: 1 INJECTION, SOLUTION INTRAMUSCULAR; INTRAVENOUS; SUBCUTANEOUS at 08:39

## 2019-01-01 RX ADMIN — FENTANYL CITRATE 50 MCG: 50 INJECTION, SOLUTION INTRAMUSCULAR; INTRAVENOUS at 04:16

## 2019-01-01 RX ADMIN — LABETALOL HYDROCHLORIDE 10 MG: 5 INJECTION INTRAVENOUS at 05:32

## 2019-01-01 RX ADMIN — METHYLPREDNISOLONE SODIUM SUCCINATE 125 MG: 125 INJECTION, POWDER, FOR SOLUTION INTRAMUSCULAR; INTRAVENOUS at 10:47

## 2019-01-01 RX ADMIN — PIPERACILLIN SODIUM,TAZOBACTAM SODIUM 3.38 G: 3; .375 INJECTION, POWDER, FOR SOLUTION INTRAVENOUS at 04:00

## 2019-01-01 RX ADMIN — MORPHINE SULFATE 10 MG: 10 SOLUTION ORAL at 01:10

## 2019-01-01 RX ADMIN — PIPERACILLIN AND TAZOBACTAM 3.38 G: 3; .375 INJECTION, POWDER, LYOPHILIZED, FOR SOLUTION INTRAVENOUS at 21:54

## 2019-01-01 RX ADMIN — IPRATROPIUM BROMIDE AND ALBUTEROL SULFATE 3 ML: .5; 3 SOLUTION RESPIRATORY (INHALATION) at 19:08

## 2019-01-01 RX ADMIN — METOPROLOL TARTRATE 2.5 MG: 5 INJECTION INTRAVENOUS at 14:20

## 2019-01-01 RX ADMIN — PIPERACILLIN SODIUM,TAZOBACTAM SODIUM 3.38 G: 3; .375 INJECTION, POWDER, FOR SOLUTION INTRAVENOUS at 15:43

## 2019-01-01 RX ADMIN — AMIODARONE HYDROCHLORIDE 150 MG: 1.5 INJECTION, SOLUTION INTRAVENOUS at 19:56

## 2019-01-01 RX ADMIN — HYDROMORPHONE HYDROCHLORIDE 0.5 MG: 1 INJECTION, SOLUTION INTRAMUSCULAR; INTRAVENOUS; SUBCUTANEOUS at 02:03

## 2019-01-01 RX ADMIN — SIMVASTATIN 20 MG: 10 TABLET, FILM COATED ORAL at 21:01

## 2019-01-01 RX ADMIN — DILTIAZEM HYDROCHLORIDE 300 MG: 180 CAPSULE, EXTENDED RELEASE ORAL at 12:48

## 2019-01-01 RX ADMIN — METHYLPREDNISOLONE SODIUM SUCCINATE 62.5 MG: 125 INJECTION, POWDER, FOR SOLUTION INTRAMUSCULAR; INTRAVENOUS at 03:59

## 2019-01-01 RX ADMIN — HYDRALAZINE HYDROCHLORIDE 20 MG: 20 INJECTION INTRAMUSCULAR; INTRAVENOUS at 23:39

## 2019-01-01 RX ADMIN — SODIUM CHLORIDE 81 ML: 9 INJECTION, SOLUTION INTRAVENOUS at 19:55

## 2019-01-01 RX ADMIN — LOSARTAN POTASSIUM 50 MG: 50 TABLET ORAL at 20:59

## 2019-01-01 RX ADMIN — METHYLPREDNISOLONE SODIUM SUCCINATE 40 MG: 40 INJECTION, POWDER, FOR SOLUTION INTRAMUSCULAR; INTRAVENOUS at 12:41

## 2019-01-01 RX ADMIN — PIPERACILLIN SODIUM AND TAZOBACTAM SODIUM 3.38 G: 3; .375 INJECTION, POWDER, LYOPHILIZED, FOR SOLUTION INTRAVENOUS at 04:06

## 2019-01-01 RX ADMIN — IPRATROPIUM BROMIDE AND ALBUTEROL SULFATE 3 ML: .5; 3 SOLUTION RESPIRATORY (INHALATION) at 11:41

## 2019-01-01 RX ADMIN — PANTOPRAZOLE SODIUM 40 MG: 40 INJECTION, POWDER, LYOPHILIZED, FOR SOLUTION INTRAVENOUS at 09:36

## 2019-01-01 RX ADMIN — METOPROLOL TARTRATE 2.5 MG: 5 INJECTION INTRAVENOUS at 17:12

## 2019-01-01 RX ADMIN — PIPERACILLIN SODIUM AND TAZOBACTAM SODIUM 4.5 G: 4; .5 INJECTION, POWDER, LYOPHILIZED, FOR SOLUTION INTRAVENOUS at 04:00

## 2019-01-01 RX ADMIN — HEPARIN SODIUM 5000 UNITS: 5000 INJECTION, SOLUTION INTRAVENOUS; SUBCUTANEOUS at 05:45

## 2019-01-01 RX ADMIN — IPRATROPIUM BROMIDE AND ALBUTEROL SULFATE 3 ML: .5; 3 SOLUTION RESPIRATORY (INHALATION) at 15:16

## 2019-01-01 RX ADMIN — PIPERACILLIN AND TAZOBACTAM 3.38 G: 3; .375 INJECTION, POWDER, LYOPHILIZED, FOR SOLUTION INTRAVENOUS at 11:02

## 2019-01-01 RX ADMIN — LOSARTAN POTASSIUM 50 MG: 50 TABLET ORAL at 21:01

## 2019-01-01 RX ADMIN — LOSARTAN POTASSIUM 50 MG: 50 TABLET ORAL at 07:52

## 2019-01-01 RX ADMIN — FENTANYL CITRATE 50 MCG: 50 INJECTION, SOLUTION INTRAMUSCULAR; INTRAVENOUS at 03:58

## 2019-01-01 RX ADMIN — HYDROMORPHONE HYDROCHLORIDE 0.5 MG: 1 INJECTION, SOLUTION INTRAMUSCULAR; INTRAVENOUS; SUBCUTANEOUS at 05:57

## 2019-01-01 RX ADMIN — SUGAMMADEX 100 MG: 100 INJECTION, SOLUTION INTRAVENOUS at 04:58

## 2019-01-01 RX ADMIN — LIDOCAINE HYDROCHLORIDE 50 MG: 20 INJECTION, SOLUTION INFILTRATION; PERINEURAL at 03:58

## 2019-01-01 RX ADMIN — PROPOFOL 35 MCG/KG/MIN: 10 INJECTION, EMULSION INTRAVENOUS at 05:45

## 2019-01-01 RX ADMIN — METHYLPREDNISOLONE SODIUM SUCCINATE 62.5 MG: 125 INJECTION, POWDER, FOR SOLUTION INTRAMUSCULAR; INTRAVENOUS at 15:40

## 2019-01-01 RX ADMIN — OXYCODONE HYDROCHLORIDE 5 MG: 5 TABLET ORAL at 16:54

## 2019-01-01 RX ADMIN — FUROSEMIDE 40 MG: 10 INJECTION, SOLUTION INTRAVENOUS at 17:18

## 2019-01-01 RX ADMIN — POTASSIUM CHLORIDE 20 MEQ: 400 INJECTION, SOLUTION INTRAVENOUS at 06:33

## 2019-01-01 RX ADMIN — PANTOPRAZOLE SODIUM 40 MG: 40 INJECTION, POWDER, FOR SOLUTION INTRAVENOUS at 20:55

## 2019-01-01 RX ADMIN — SODIUM CHLORIDE: 9 INJECTION, SOLUTION INTRAVENOUS at 00:01

## 2019-01-01 RX ADMIN — PROPOFOL 15 MCG/KG/MIN: 10 INJECTION, EMULSION INTRAVENOUS at 07:27

## 2019-01-01 RX ADMIN — FLUTICASONE FUROATE AND VILANTEROL TRIFENATATE 1 PUFF: 200; 25 POWDER RESPIRATORY (INHALATION) at 07:52

## 2019-01-01 RX ADMIN — HYDROMORPHONE HYDROCHLORIDE 0.5 MG: 1 INJECTION, SOLUTION INTRAMUSCULAR; INTRAVENOUS; SUBCUTANEOUS at 05:01

## 2019-01-01 RX ADMIN — PHENYLEPHRINE HYDROCHLORIDE 100 MCG: 10 INJECTION INTRAVENOUS at 04:08

## 2019-01-01 RX ADMIN — PIPERACILLIN AND TAZOBACTAM 3.38 G: 3; .375 INJECTION, POWDER, LYOPHILIZED, FOR SOLUTION INTRAVENOUS at 17:17

## 2019-01-01 RX ADMIN — DEXTROSE AND SODIUM CHLORIDE: 5; 900 INJECTION, SOLUTION INTRAVENOUS at 04:00

## 2019-01-01 RX ADMIN — LEVALBUTEROL HYDROCHLORIDE 1.25 MG: 1.25 SOLUTION, CONCENTRATE RESPIRATORY (INHALATION) at 10:48

## 2019-01-01 RX ADMIN — HEPARIN SODIUM 5000 UNITS: 5000 INJECTION, SOLUTION INTRAVENOUS; SUBCUTANEOUS at 17:17

## 2019-01-01 RX ADMIN — HYDROMORPHONE HYDROCHLORIDE 0.5 MG: 1 INJECTION, SOLUTION INTRAMUSCULAR; INTRAVENOUS; SUBCUTANEOUS at 14:05

## 2019-01-01 RX ADMIN — PIPERACILLIN AND TAZOBACTAM 3.38 G: 3; .375 INJECTION, POWDER, LYOPHILIZED, FOR SOLUTION INTRAVENOUS at 03:59

## 2019-01-01 RX ADMIN — PIPERACILLIN AND TAZOBACTAM 3.38 G: 3; .375 INJECTION, POWDER, LYOPHILIZED, FOR SOLUTION INTRAVENOUS at 10:16

## 2019-01-01 RX ADMIN — IPRATROPIUM BROMIDE AND ALBUTEROL SULFATE 3 ML: .5; 3 SOLUTION RESPIRATORY (INHALATION) at 07:00

## 2019-01-01 RX ADMIN — HYDROMORPHONE HYDROCHLORIDE 0.5 MG: 1 INJECTION, SOLUTION INTRAMUSCULAR; INTRAVENOUS; SUBCUTANEOUS at 15:53

## 2019-01-01 RX ADMIN — IPRATROPIUM BROMIDE AND ALBUTEROL SULFATE 3 ML: .5; 3 SOLUTION RESPIRATORY (INHALATION) at 07:06

## 2019-01-01 RX ADMIN — SUCCINYLCHOLINE CHLORIDE 100 MG: 20 INJECTION, SOLUTION INTRAMUSCULAR; INTRAVENOUS; PARENTERAL at 03:58

## 2019-01-01 RX ADMIN — METHYLPREDNISOLONE SODIUM SUCCINATE 62.5 MG: 125 INJECTION, POWDER, FOR SOLUTION INTRAMUSCULAR; INTRAVENOUS at 17:07

## 2019-01-01 RX ADMIN — HEPARIN SODIUM 5000 UNITS: 5000 INJECTION, SOLUTION INTRAVENOUS; SUBCUTANEOUS at 18:24

## 2019-01-01 RX ADMIN — PHENYLEPHRINE HYDROCHLORIDE 100 MCG: 10 INJECTION INTRAVENOUS at 04:18

## 2019-01-01 RX ADMIN — METOPROLOL TARTRATE 2.5 MG: 5 INJECTION INTRAVENOUS at 16:04

## 2019-01-01 RX ADMIN — HYDROMORPHONE HYDROCHLORIDE 0.5 MG: 1 INJECTION, SOLUTION INTRAMUSCULAR; INTRAVENOUS; SUBCUTANEOUS at 20:03

## 2019-01-01 RX ADMIN — METHYLPREDNISOLONE SODIUM SUCCINATE 62.5 MG: 125 INJECTION, POWDER, FOR SOLUTION INTRAMUSCULAR; INTRAVENOUS at 15:24

## 2019-01-01 RX ADMIN — HYDROCODONE BITARTRATE AND ACETAMINOPHEN 2 TABLET: 5; 325 TABLET ORAL at 15:09

## 2019-01-01 RX ADMIN — DILTIAZEM HYDROCHLORIDE 300 MG: 180 CAPSULE, EXTENDED RELEASE ORAL at 08:41

## 2019-01-01 RX ADMIN — LOSARTAN POTASSIUM 50 MG: 50 TABLET ORAL at 15:01

## 2019-01-01 RX ADMIN — HYDROMORPHONE HYDROCHLORIDE 0.2 MG: 1 INJECTION, SOLUTION INTRAMUSCULAR; INTRAVENOUS; SUBCUTANEOUS at 00:36

## 2019-01-01 RX ADMIN — MORPHINE SULFATE 10 MG: 100 SOLUTION ORAL at 02:48

## 2019-01-01 RX ADMIN — METHYLPREDNISOLONE SODIUM SUCCINATE 81.25 MG: 125 INJECTION, POWDER, FOR SOLUTION INTRAMUSCULAR; INTRAVENOUS at 16:19

## 2019-01-01 RX ADMIN — HEPARIN SODIUM 5000 UNITS: 5000 INJECTION, SOLUTION INTRAVENOUS; SUBCUTANEOUS at 17:18

## 2019-01-01 RX ADMIN — PREDNISONE 40 MG: 20 TABLET ORAL at 15:40

## 2019-01-01 RX ADMIN — HYDRALAZINE HYDROCHLORIDE 10 MG: 10 TABLET ORAL at 15:01

## 2019-01-01 RX ADMIN — DILTIAZEM HYDROCHLORIDE 300 MG: 180 CAPSULE, EXTENDED RELEASE ORAL at 15:08

## 2019-01-01 RX ADMIN — MORPHINE SULFATE 10 MG: 100 SOLUTION ORAL at 07:13

## 2019-01-01 RX ADMIN — PHENYLEPHRINE HYDROCHLORIDE 200 MCG: 10 INJECTION INTRAVENOUS at 04:06

## 2019-01-01 RX ADMIN — LOSARTAN POTASSIUM 50 MG: 50 TABLET ORAL at 21:28

## 2019-01-01 RX ADMIN — PIPERACILLIN SODIUM,TAZOBACTAM SODIUM 3.38 G: 3; .375 INJECTION, POWDER, FOR SOLUTION INTRAVENOUS at 10:15

## 2019-01-01 RX ADMIN — FLUTICASONE FUROATE AND VILANTEROL TRIFENATATE 1 PUFF: 200; 25 POWDER RESPIRATORY (INHALATION) at 15:03

## 2019-01-01 RX ADMIN — ALBUMIN HUMAN: 0.05 INJECTION, SOLUTION INTRAVENOUS at 04:06

## 2019-01-01 RX ADMIN — HYDROMORPHONE HYDROCHLORIDE 0.5 MG: 1 INJECTION, SOLUTION INTRAMUSCULAR; INTRAVENOUS; SUBCUTANEOUS at 15:23

## 2019-01-01 RX ADMIN — METOPROLOL TARTRATE 2.5 MG: 5 INJECTION INTRAVENOUS at 01:57

## 2019-01-01 RX ADMIN — IPRATROPIUM BROMIDE AND ALBUTEROL SULFATE 3 ML: .5; 3 SOLUTION RESPIRATORY (INHALATION) at 16:25

## 2019-01-01 RX ADMIN — NAPROXEN 250 MG: 250 TABLET ORAL at 17:55

## 2019-01-01 RX ADMIN — MORPHINE SULFATE 10 MG: 100 SOLUTION ORAL at 10:56

## 2019-01-01 RX ADMIN — METOPROLOL TARTRATE 2.5 MG: 5 INJECTION INTRAVENOUS at 09:38

## 2019-01-01 RX ADMIN — PIPERACILLIN AND TAZOBACTAM 3.38 G: 3; .375 INJECTION, POWDER, LYOPHILIZED, FOR SOLUTION INTRAVENOUS at 16:34

## 2019-01-01 RX ADMIN — METOPROLOL TARTRATE 2.5 MG: 5 INJECTION INTRAVENOUS at 00:40

## 2019-01-01 RX ADMIN — HYDROMORPHONE HYDROCHLORIDE 0.3 MG: 1 INJECTION, SOLUTION INTRAMUSCULAR; INTRAVENOUS; SUBCUTANEOUS at 20:47

## 2019-01-01 RX ADMIN — CHLORHEXIDINE GLUCONATE 15 ML: 1.2 RINSE ORAL at 09:08

## 2019-01-01 RX ADMIN — PANTOPRAZOLE SODIUM 40 MG: 40 INJECTION, POWDER, FOR SOLUTION INTRAVENOUS at 21:45

## 2019-01-01 RX ADMIN — SODIUM CHLORIDE: 9 INJECTION, SOLUTION INTRAVENOUS at 10:00

## 2019-01-01 RX ADMIN — SODIUM CHLORIDE: 9 INJECTION, SOLUTION INTRAVENOUS at 20:49

## 2019-01-01 RX ADMIN — PANTOPRAZOLE SODIUM 40 MG: 40 INJECTION, POWDER, FOR SOLUTION INTRAVENOUS at 08:44

## 2019-01-01 RX ADMIN — HYDROMORPHONE HYDROCHLORIDE 0.5 MG: 1 INJECTION, SOLUTION INTRAMUSCULAR; INTRAVENOUS; SUBCUTANEOUS at 12:32

## 2019-01-01 RX ADMIN — METHYLPREDNISOLONE SODIUM SUCCINATE 62.5 MG: 125 INJECTION, POWDER, FOR SOLUTION INTRAMUSCULAR; INTRAVENOUS at 17:17

## 2019-01-01 RX ADMIN — PIPERACILLIN SODIUM,TAZOBACTAM SODIUM 2.25 G: 2; .25 INJECTION, POWDER, FOR SOLUTION INTRAVENOUS at 21:00

## 2019-01-01 RX ADMIN — PANTOPRAZOLE SODIUM 40 MG: 40 TABLET, DELAYED RELEASE ORAL at 06:37

## 2019-01-01 RX ADMIN — AMIODARONE HYDROCHLORIDE 0.5 MG/MIN: 50 INJECTION, SOLUTION INTRAVENOUS at 03:19

## 2019-01-01 RX ADMIN — SIMVASTATIN 20 MG: 20 TABLET, FILM COATED ORAL at 21:54

## 2019-01-01 RX ADMIN — HYDROMORPHONE HYDROCHLORIDE 0.5 MG: 1 INJECTION, SOLUTION INTRAMUSCULAR; INTRAVENOUS; SUBCUTANEOUS at 10:10

## 2019-01-01 RX ADMIN — LOSARTAN POTASSIUM 50 MG: 50 TABLET ORAL at 08:41

## 2019-01-01 RX ADMIN — PROPOFOL 20 MCG/KG/MIN: 10 INJECTION, EMULSION INTRAVENOUS at 19:15

## 2019-01-01 RX ADMIN — IPRATROPIUM BROMIDE AND ALBUTEROL SULFATE 3 ML: .5; 3 SOLUTION RESPIRATORY (INHALATION) at 16:31

## 2019-01-01 RX ADMIN — PIPERACILLIN SODIUM,TAZOBACTAM SODIUM 2.25 G: 2; .25 INJECTION, POWDER, FOR SOLUTION INTRAVENOUS at 15:49

## 2019-01-01 RX ADMIN — ACETAMINOPHEN 1000 MG: 500 TABLET, FILM COATED ORAL at 08:40

## 2019-01-01 RX ADMIN — HYDROMORPHONE HYDROCHLORIDE 0.5 MG: 1 INJECTION, SOLUTION INTRAMUSCULAR; INTRAVENOUS; SUBCUTANEOUS at 02:20

## 2019-01-01 RX ADMIN — PIPERACILLIN AND TAZOBACTAM 3.38 G: 3; .375 INJECTION, POWDER, LYOPHILIZED, FOR SOLUTION INTRAVENOUS at 04:16

## 2019-01-01 ASSESSMENT — ACTIVITIES OF DAILY LIVING (ADL)
ADLS_ACUITY_SCORE: 25
ADLS_ACUITY_SCORE: 29
ADLS_ACUITY_SCORE: 25
ADLS_ACUITY_SCORE: 23
ADLS_ACUITY_SCORE: 22
ADLS_ACUITY_SCORE: 21
ADLS_ACUITY_SCORE: 27
ADLS_ACUITY_SCORE: 27
ADLS_ACUITY_SCORE: 25
ADLS_ACUITY_SCORE: 29
ADLS_ACUITY_SCORE: 25
ADLS_ACUITY_SCORE: 22
ADLS_ACUITY_SCORE: 25
ADLS_ACUITY_SCORE: 26
ADLS_ACUITY_SCORE: 25
ADLS_ACUITY_SCORE: 21
ADLS_ACUITY_SCORE: 26
ADLS_ACUITY_SCORE: 25
ADLS_ACUITY_SCORE: 19
ADLS_ACUITY_SCORE: 29
ADLS_ACUITY_SCORE: 26
ADLS_ACUITY_SCORE: 22
ADLS_ACUITY_SCORE: 25
ADLS_ACUITY_SCORE: 27
ADLS_ACUITY_SCORE: 25
ADLS_ACUITY_SCORE: 27
ADLS_ACUITY_SCORE: 26
ADLS_ACUITY_SCORE: 26
ADLS_ACUITY_SCORE: 27
ADLS_ACUITY_SCORE: 26
ADLS_ACUITY_SCORE: 25
ADLS_ACUITY_SCORE: 21
ADLS_ACUITY_SCORE: 29
ADLS_ACUITY_SCORE: 26
ADLS_ACUITY_SCORE: 21
ADLS_ACUITY_SCORE: 29
ADLS_ACUITY_SCORE: 29
ADLS_ACUITY_SCORE: 26
ADLS_ACUITY_SCORE: 27
ADLS_ACUITY_SCORE: 26

## 2019-01-01 ASSESSMENT — ENCOUNTER SYMPTOMS
SEIZURES: 0
NAUSEA: 0
ABDOMINAL PAIN: 0
SHORTNESS OF BREATH: 1
ABDOMINAL PAIN: 0
NAUSEA: 0
DYSRHYTHMIAS: 1
DIFFICULTY URINATING: 0
VOMITING: 0
FEVER: 0
WHEEZING: 0
DYSURIA: 0
BACK PAIN: 1
SHORTNESS OF BREATH: 1
COUGH: 1

## 2019-01-01 ASSESSMENT — COPD QUESTIONNAIRES
CAT_SEVERITY: MODERATE
COPD: 1

## 2019-01-01 ASSESSMENT — MIFFLIN-ST. JEOR
SCORE: 894.28
SCORE: 934.28
SCORE: 919.28
SCORE: 869.63
SCORE: 869.63
SCORE: 896.28
SCORE: 852.28

## 2019-01-01 ASSESSMENT — LIFESTYLE VARIABLES: TOBACCO_USE: 1

## 2019-07-16 NOTE — ED PROVIDER NOTES
History     Chief Complaint:  Shortness of Breath    HPI   Michelle Polo is a 87 year old female who presents to the emergency department today for evaluation of shortness of breath. She has a history of COPD and is on oxygen at home. Patient notes she is not normally on oxygen, but these past few nights she has used 2 L oxygen. She has felt increasingly weak since last Monday. Her pulse oxygen was 88-90 at home, she is normally around 94. She does see a doctor in [place] that may be a pulmonologist, but family is unsure. Pt notes that her right leg is swollen everyday, but it is not painful for her. This has been present for months. She has a cough every now and then, but this is not chronic. She denies racing heart, painful urination, abdominal pain, nausea, arm pain, leg pain, leg swelling, fever, and painful breathing. She took aspirin today, but has not taken it for a year. She has constipation problems, but these are not new. Of note, it has been quite hot outside and the patient does not like to turn her A/C on, and her oxygen tank heats up the house more. She has been prescribed medications for her COPD, but does not take them or fill the prescriptions.    Allergies:  Albuterol     Medications:    ASPIRIN   bisacodyl (DULCOLAX)   diltiazem (CARDIZEM)    FERROUS GLUCONATE   fluticasone-salmeterol (ADVAIR)    levalbuterol (XOPENEX CONC)   polyethylene glycol (MIRALAX)   senna-docusate   simvastatin        Past Medical History:    Anemia   Arrhythmia   Bilateral leg edema   Chronic airway obstruction    COPD   Dyslipidemia   Hypercholesteremia   Hypertension   Non-STEMI   UTI       Past Surgical History:    Iliac endarterectomy and bypass  Virectomy    Family History:    No family history on file.    Social History:  The patient was accompanied to the ED by her son.  Smoking Status: former  Smokeless Tobacco: no  Alcohol Use: no   Marital Status:   [5]     Review of Systems   Constitutional:  "Negative for fever.   Respiratory: Positive for cough and shortness of breath. Negative for wheezing.    Cardiovascular: Negative for chest pain.   Gastrointestinal: Negative for abdominal pain and nausea.   Genitourinary: Negative for difficulty urinating and dysuria.   All other systems reviewed and are negative.    Physical Exam     Patient Vitals for the past 24 hrs:   BP Temp src Pulse Resp SpO2 Height Weight   07/16/19 1004 179/67 Oral 82 18 92 % 1.575 m (5' 2\") 50.8 kg (112 lb)        Physical Exam  General: Alert and interactive. Appears well. Blind. Cooperative and pleasant.   Eyes: The pupils are equal and round. EOMs intact. No scleral icterus.  ENT: No abnormalities to the external nose or ears. Mucous membranes moist. Posterior oropharynx is non-erythematous.    Neck: Trachea is in the midline. No nuchal rigidity.     CV: Regular rate and rhythm. S1 and S2 normal without murmur, click, gallop or rub.  Resp: On two liters of oxygen, via nasal canula. Breath sounds are clear bilaterally, without rhonchi, wheezes, rales. Non-labored, no retractions or accessory muscle use.     GI: Abdomen is soft without distension. No tenderness to palpation. No peritoneal signs.    MS: Moving all extremities well. Good muscle tone.   Skin: Warm and dry. No rash or lesions noted. 1+ pitting edema to the right lower extremity.  Neuro: Alert and oriented x 3. No focal neurologic deficits. Good strength and sensation in upper and lower extremities.    Psych: Awake. Alert.  Normal affect. Appropriate interactions.  Lymph: No anterior or posterior cervical lymphadenopathy noted.    Emergency Department Course     ECG (10:56:07):  Rate 81 bpm. AK interval 176. QRS duration 64. QT/QTc 358/415. P-R-T axes 83 70 85. Normal sinus rhythm. Anteroseptal infarct, age undetermined. abnormal ECG.  Interpreted at 1102 by Lyric Waters PA-C.     Imaging:  Radiographic findings were communicated with the patient who voiced understanding " of the findings.    US lower extremity  IMPRESSION: No evidence for DVT within the right leg.  Reading per radiology     Chest XR  IMPRESSION: No acute cardiopulmonary disease identified. Hyperinflated  lungs can be seen with chronic obstructive pulmonary disease. Stable  cardiac silhouette. Some small scarring or atelectasis noted at the  right lateral lung base.  Reading per radiology     Laboratory:  Laboratory findings were communicated with the patient who voiced understanding of the findings.     CBC: WNL (WBC 8.1, HGB 12.8, )   CMP: Carbon dioxide 41, Anion gap <1, Glucose 102 (Creatinine 0.73)   Troponin (Collected 1031): <0.015   TSH with free T4 reflex: 1.61  BNP: 568  UA RESULTS:  Recent Labs   Lab Test 07/16/19  1139   COLOR Yellow   APPEARANCE Clear   URINEGLC Negative   URINEBILI Negative   URINEKETONE 10*   SG 1.018   UBLD Negative   URINEPH 6.5   PROTEIN 100*   NITRITE Negative   LEUKEST Negative   RBCU 1   WBCU <1        Interventions:  1048 SoluMedrol 125 mg IV  1048 Xop[facility]     Emergency Department Course:  Past medical records, nursing notes, and vitals reviewed.    1017: I performed an exam of the patient and obtained history, as documented above.     The patient was sent for a US and chest XR while in the emergency department, findings above.     IV inserted and blood drawn.     The patient provided a urine sample here in the emergency department. This was sent for laboratory testing, findings above.     Patient had an appointment set up with an [facility] PCP tomorrow at 11:30 for follow up.    1307: I rechecked the patient. Explained findings to patient.     1310: I rechecked the patient. Findings and plan explained to the Patient. Patient discharged home with instructions regarding supportive care, medications, and reasons to return. The importance of close follow-up was reviewed.        Impression & Plan      Medical Decision Making:  Michelle Polo is a 87 year old female with a  history of COPD, SVT, hypertension, N-STEMI, peripheral arterial disease, and dyslipidemia who presents to the emergency department today for evaluation of shortness of breath.The patient has known COPD, and has Advair, nebulization and supplemental PO2 at home; she has not needed to use these over the past few months. She has began to use oxygen over the past few days. She has been in the 88's without her oxygen but she came up to 94-95 on oxygen at home. Broad differential considered, including ACS, pleural effusions,  CHF, COPD exacerbation, infectious etiology, pneumonia, and many others.     CBC shows no signs of leukocytosis or anemia. CMP shows elevation in her CO2, but no other electrolyte abnormalities or renal dysfunction. Troponin negative and ECG shows no signs of ischemia or infarction, thus ACS is unlikely. Additionally, thyroid function is WNL. CXR shows no signs of pleural effusions, cardiomegaly, and BNP normal, thus CHF exacerbation unlikely. She did have some right lower extremity edema which is likely dependent, as ultrasound of her right leg was obtained and is negative for DVT. She has normal pulses and there is no signs of arterial occlusion at this time. Skin shows no signs of cellulitis.     Chest XR as noted shows hyperinflated lungs which is consistent with COPD. She felt better here while being on O2 and having a nebulizer. I stressed the importance of using her nebulizers, her Advair inhaler, and supplemental oxygen at home. I have provided a short prescription for Prednisone as well.  I explained that she should turnher A/C on as the hot air may make it difficult for her to breath. The patient wanted to go home, and I think this is reasonable as long as she takes her medications. If she has trouble taking her medications, she may need to return for social purposes to get her set up in a transitional care facility. Krystal, the nurse care coordinator, was able to get her a PCP appointment  for tomorrow to make sure she is doing well at home and following medications. Otherwise, her vitals are stable here and she will continue using her oxygen at home. She is safe for discharge at this time.      Diagnosis:    ICD-10-CM    1. Dyspnea, unspecified type R06.00    2. COPD exacerbation (H) J44.1        Disposition:  discharged to home    Discharge Medications:     Medication List      Started    predniSONE 20 MG tablet  Commonly known as:  DELTASONE  Take two tablets (= 40mg) each day for 5 (five) days        Modified    * fluticasone-salmeterol 250-50 MCG/DOSE inhaler  Commonly known as:  ADVAIR  1 puff, Inhalation, EVERY 12 HOURS  What changed:  Another medication with the same name was added. Make sure you understand how and when to take each.     * fluticasone-salmeterol 250-50 MCG/DOSE inhaler  Commonly known as:  ADVAIR DISKUS  1 puff, Inhalation, 2 TIMES DAILY  What changed:  You were already taking a medication with the same name, and this prescription was added. Make sure you understand how and when to take each.     * levalbuterol 1.25 MG/0.5ML neb solution  Commonly known as:  XOPENEX CONC  1.25 mg, Nebulization, EVERY 6 HOURS  What changed:  Another medication with the same name was added. Make sure you understand how and when to take each.     * levalbuterol 1.25 MG/0.5ML neb solution  Commonly known as:  XOPENEX CONC  1.25 mg, Nebulization, EVERY 4 HOURS PRN  What changed:  Another medication with the same name was added. Make sure you understand how and when to take each.     * levalbuterol 0.31 MG/3ML neb solution  Commonly known as:  XOPENEX  1 ampule, Nebulization, EVERY 6 HOURS PRN  What changed:  You were already taking a medication with the same name, and this prescription was added. Make sure you understand how and when to take each.         * This list has 5 medication(s) that are the same as other medications prescribed for you. Read the directions carefully, and ask your doctor or  other care provider to review them with you.                Caridad Cary  7/16/2019    EMERGENCY DEPARTMENT  Scribe Disclosure:  I, Caridad Cary, am serving as a scribe at 10:07 AM on 7/16/2019 to document services personally performed by Lyric Waters PA-C based on my observations and the provider's statements to me.       Lyric Waters PA-C  07/16/19 1828

## 2019-07-16 NOTE — ED AVS SNAPSHOT
Emergency Department  [address] [place] [address]  Phone:  [phone]  Fax:  [phone]                                    Michelle Polo   MRN: [id number]    Department:   Emergency Department   Date of Visit:  7/16/2019           After Visit Summary Signature Page    I have received my discharge instructions, and my questions have been answered. I have discussed any challenges I see with this plan with the nurse or doctor.    ..........................................................................................................................................  Patient/Patient Representative Signature      ..........................................................................................................................................  Patient Representative Print Name and Relationship to Patient    ..................................................               ................................................  Date                                   Time    ..........................................................................................................................................  Reviewed by Signature/Title    ...................................................              ..............................................  Date                                               Time          22EPIC Rev 08/18

## 2019-07-16 NOTE — DISCHARGE INSTRUCTIONS
You have an Emergency Room follow up appointment with Dr. Karo Souza on Wednesday July 17th, 2019 at 11:30am at the [facility].  If you have questions re: this appointment please call the clinic at [phone] .  Please bring your discharge papers with you to your appointment.  [facility]:  [address], [facility] [address]  [phone]      HF Readmission - This patient has been readmitted within 30 days and is identified in Epic as a HF patient. Please place a  [facility] Evaluation IP Consult  to have them meet the patient in the hospital and arrange care coordination to avoid re-hospitalizations. Also consult Cardiology for all patients readmitted with recurrent HF within 30 days to assist with inpatient management. Consult Cardiology as needed for non-HF readmissions.  USE YOUR OXYGEN, YOUR NEBULIZER, YOUR ADVAIR, AND YOUR PREDNISONE COURSE.   FOLLOW UP WITH PRIMARY CARE TOMORROW.   IF YOU CANNOT DO THIS, WE MAY NEED TO HAVE YOU COME BACK HERE.

## 2019-08-12 PROBLEM — H54.7 BLIND: Status: ACTIVE | Noted: 2019-01-01

## 2019-08-12 PROBLEM — S32.10XA SACRAL FRACTURE (H): Status: ACTIVE | Noted: 2019-01-01

## 2019-08-12 PROBLEM — M48.50XA VERTEBRAL COMPRESSION FRACTURE (H): Status: ACTIVE | Noted: 2019-01-01

## 2019-08-12 PROBLEM — S32.10XA SACRAL FRACTURE (H): Status: RESOLVED | Noted: 2019-01-01 | Resolved: 2019-01-01

## 2019-08-12 PROBLEM — S32.10XA FRACTURE OF SACRUM (H): Status: ACTIVE | Noted: 2019-01-01

## 2019-08-12 NOTE — PROGRESS NOTES
obs goals : Acute pain after fall       1.  Pain controlled with oral analgesia: partially met. Pt with significant pain with movement. norco given.       2.  Vital signs stable: partially met. BP elevated in 170s      3.  Diagnotic testing complete: Yes      4.  Cleared from consultants (if applicable): No, ortho/pt/OT/SW pending.      5. Return to near baseline physical activity: No. Up with 1-2 assist.

## 2019-08-12 NOTE — H&P
[facility]    History and Physical  Hospitalist       Date of Admission:  8/12/2019    Assessment & Plan   Michelle Polo is a 87 year old female with COPD on O2, who presents with pain and difficulty walking after fall:    Principal Problem:    Fracture of sacrum -- suspect this occurred when she fell backward on 7/31/19, and this is why she can't walk.     -- pain meds   -- PT/OT eval   -- Ortho consult (?wieght bear as tolerated)   -- given her limitations, may need TCU (lives alone, currently can't ambulate 2nd to pain)      Vert comp fractures (T12 and L4) -- probably old        Active Problems:    Paroxysmal SVT -- stable   -- on Diltiazem 300 mg daily       COPD (chronic obstructive pulmonary disease) -- stable   -- on home O2 at 2 LPM per nasal canula      Blind     Plan:  Admit to observation, orders as above     DVT Prophylaxis: Pneumatic Compression Devices  Code Status: DNR / DNI    Disposition: Expected discharge in one day pending PT/OT evaluation, and possible need for TCU    Jose R Castrejon MD  Pager: [phone]  Cell Phone:  [phone]     Primary Care Physician   Brittany Wiggins    Chief Complaint   Back pain, unable to walk    History is obtained from Patient    History of Present Illness    87 year old female with COPD on O2, paroxysmal SVT, and legally blind, who fell backward on 7/31/19 (12 days ago) when trying to open her refrigerator door and landed on her buttock aera, and ever since then has had severe pain over her sacral area and has been difficult to walk.  Pain seems more prominent on right hip area, lives by herself, having difficulty functioning.  Did go to urgent care 4 days ago, prescribed Tylenol #3 for pain, and Naprosyn.  Presented to ER today since no better, and ambulation more difficult.  She is brought in by her daughter.     In ER had Lumbar Sacral CT which showed:  1. Acute relatively nondisplaced sacral fracture.  2. T12 fracture.  3. Old L4  endplate compression fracture.  4. Osteopenia    Past Medical History    I have reviewed this patient's medical history and updated it with pertinent information if needed.   Past Medical History:   Diagnosis Date     Anemia      Bilateral leg edema      COPD (chronic obstructive pulmonary disease) (H)      Dyslipidemia      Hypercholesteremia      Hypertension      Non-STEMI (non-ST elevated myocardial infarction) (H)     2-2014     Oxygen dependent      PVD (peripheral vascular disease) (H)      SVT (supraventricular tachycardia) (H)      UTI (lower urinary tract infection)     2-2014       Past Surgical History   I have reviewed this patient's surgical history and updated it with pertinent information if needed.  Past Surgical History:   Procedure Laterality Date     COLONOSCOPY N/A 2/11/2015    Procedure: COMBINED COLONOSCOPY, SINGLE OR MULTIPLE BIOPSY/POLYPECTOMY BY BIOPSY;  Surgeon: Reynold Maldonado MD;  Location:  GI     GENITOURINARY SURGERY       OTHER SURGICAL HISTORY      iliac endarterectomy and bypass     VITRECTOMY PARSPLANA WITH 25 GAUGE SYSTEM Left 12/9/2015    Procedure: VITRECTOMY PARSPLANA WITH 25 GAUGE SYSTEM;  Surgeon: David Santana MD;  Location:  [facility]     VITRECTOMY PARSPLANA WITH 25 GAUGE SYSTEM Right 2/11/2016    Procedure: VITRECTOMY PARSPLANA WITH 25 GAUGE SYSTEM;  Surgeon: David Santana MD;  Location: [facility]       Prior to Admission Medications   Prior to Admission Medications   Prescriptions Last Dose Informant Patient Reported? Taking?   acetaminophen-codeine (TYLENOL #3) 300-30 MG tablet 8/12/2019 at am Self Yes Yes   Sig: Take 1 tablet by mouth every 4 hours as needed for severe pain   diltiazem (CARDIZEM CD) 300 MG 24 hr CD capsule 8/12/2019 at am Self No Yes   Sig: Take 1 capsule (300 mg) by mouth daily   fluticasone-salmeterol (ADVAIR DISKUS) 250-50 MCG/DOSE inhaler 8/12/2019 at am Self No Yes   Sig: Inhale 1 puff into the lungs 2 times daily   losartan (COZAAR) 50  MG tablet 8/12/2019 at am Self Yes Yes   Sig: Take 50 mg by mouth 2 times daily   naproxen sodium (ANAPROX) 220 MG tablet 8/12/2019 at am Self Yes Yes   Sig: Take 220 mg by mouth 2 times daily (with meals)   polyethylene glycol (MIRALAX) powder prn at prn Self No Yes   Sig: Take 17 g by mouth daily   Patient taking differently: Take 1 capful by mouth daily as needed    simvastatin (ZOCOR) 20 MG tablet 8/11/2019 at hs Self Yes Yes   Sig: Take 20 mg by mouth At Bedtime      Facility-Administered Medications: None     Allergies   Allergies   Allergen Reactions     Albuterol Palpitations       Social History   I have reviewed this patient's social history and updated it with pertinent information if needed. Michelle Polo  reports that she has quit smoking. Her smoking use included cigarettes. She has a 60.00 pack-year smoking history. She does not have any smokeless tobacco history on file. She reports that she does not drink alcohol.    Family History   I have reviewed this patient's family history and updated it with pertinent information if needed.   Family History   Problem Relation Age of Onset     Bladder Cancer Mother      Pancreatic Cancer Father        Review of Systems   The 10 point Review of Systems is negative other than noted in the HPI or here.     # Pain Assessment:  Current Pain Score 8/12/2019   Patient currently in pain? -   Pain score (0-10) 10   Pain location -   Pain descriptors -   - Michelle is experiencing pain due to sacral fracture. Pain management was discussed and the plan was created in a collaborative fashion.  Michelle's response to the current recommendations: engaged  - see orders    Physical Exam   Temp: 96.7  F (35.9  C) Temp src: Oral BP: (!) 172/55 Pulse: 76 Heart Rate: 82 Resp: 16 SpO2: 92 % O2 Device: Nasal cannula Oxygen Delivery: 3 LPM  Vital Signs with Ranges  Temp:  [96.4  F (35.8  C)-98.5  F (36.9  C)] 96.7  F (35.9  C)  Pulse:  [73-90] 76  Heart Rate:  [82-84] 82  Resp:  [16]  16  BP: (150-175)/(55-80) 172/55  SpO2:  [90 %-96 %] 92 %  107 lbs 0 oz    Constitutional: Awake, alert, cooperative, no apparent distress.  Eyes: Conjunctiva and pupils examined and normal.  Is able to see shadows, can not read.   HEENT: Moist mucous membranes, normal dentition.  Respiratory: Clear to auscultation bilaterally, no crackles or wheezing.  Cardiovascular: Regular rate and rhythm, normal S1 and S2, and no murmur noted,   No carotid bruits.  No ankle edema.   GI: Soft, non-distended, non-tender, normal bowel sounds.  Lymph/Hematologic: No anterior cervical, supraclavicular or axillary adenopathy.  Skin: No rashes, no cyanosis.   Musculoskeletal: No joint swelling, erythema or tenderness.  Neurologic: Cranial nerves 2-12 intact, no focal weakness or numbness  Psychiatric: Alert, Ox3, no obvious anxiety or depression.    Data   Data reviewed today:  I personally reviewed no EKG's today.  Recent Labs   Lab 08/12/19  1134   WBC 8.6   HGB 12.8   MCV 97         POTASSIUM 4.7   CHLORIDE 101   CO2 32   BUN 14   CR 0.66   ANIONGAP 3   MICHELLE 8.2*   *       Imaging:  Recent Results (from the past 24 hour(s))   Lumbar spine CT w/o contrast    Narrative    CT LUMBAR SPINE WITHOUT CONTRAST August 12, 2019 12:39 PM     HISTORY: T12 fracture, possible old L5 fracture, clarify fractures and  any fragments.    TECHNIQUE: Axial images were obtained through the lumbar spine without  contrast. Multiplanar reconstructions were performed. Radiation dose  for this scan was reduced using automated exposure control, adjustment  of the mA and/or kV according to patient size, or iterative  reconstruction technique..    COMPARISON: Plain radiograph dated 1/13/2016.    FINDINGS: Five lumbar type vertebral bodies are present. Posterior  alignment is normal. There is an old anterior wedge fracture through  the T12 vertebral body. There is minimal posterior protrusion of the  superior posterior portion of the T12  vertebral body and towards the  canal causing some mild central canal stenosis but no definite cord  impingement. There is also an old central endplate compression  deformity of L4 with a Schmorl's node type deformity. There is an  acute fracture through the sacrum at the S2-S3 level seen on axial  images  in the right sacrum and also axial image 104 through the  mid sacral region. This is also seen on sagittal reconstruction 38 of  series 9. Fracture is essentially nondisplaced. Multilevel  degenerative disc and facet disease is present but there is no  significant central canal stenosis. Vascular calcifications noted.  There is also vascular calcifications or tiny calculi in the left  renal pelvic region. Diffuse osteopenia noted.      Impression    IMPRESSION:  1. Acute relatively nondisplaced sacral fracture.  2. T12 fracture.  3. Old L4 endplate compression fracture.  4. Osteopenia.    ZACH LEWIS MD

## 2019-08-12 NOTE — ED NOTES
Pt ambulated two steps to a bedside commode and back. Pt oxygen saturation around 76% on 2L. Put pt on 4L after placing back in bed and pt oxygen saturation went to 90% which is pt's baseline.

## 2019-08-12 NOTE — PROGRESS NOTES
RECEIVING UNIT ED HANDOFF REVIEW    ED Nurse Handoff Report was reviewed by: Margarita Melendez on August 12, 2019 at 2:18 PM

## 2019-08-12 NOTE — ED PROVIDER NOTES
History     Chief Complaint:  Back Pain    HPI   Michelle Polo is a 87 year old female who presents with back pain after falling on the kitchen floor while opening a refrigerator 12 days ago. She was managing the pain until it was exacerbated 4 days ago for which she visited the clinic, please see XR below showing T12 compression fracture. She denies hitting her head and is not on blood thinners. She took 2 Tylenol with codeine this morning and is also on Naproxen 220 mg BID.     XR Spine Lumbar; 8/8/19  There is an age-indeterminate compression fracture of the T12 vertebral body with greater than 50 percent vertebral body height loss. Mild loss of height of the superior endplate of L4, also consistent with age-indeterminate compression fracture. Normal lumbar lordosis. Normal alignment. Mild multilevel degenerative changes of the visualized spine.     Allergies:  Albuterol      Medications:    Asprin  Naproxen  Tylenol with codeine   Cardizem   Losartan    Zocor    Past Medical History:    Anemia   Arrhythmia   Bilateral leg edema   COPD (chronic obstructive pulmonary disease)  Dyslipidemia   Hypertension   Non-STEMI (non-ST elevated myocardial infarction)   Oxygen dependent   PAD (peripheral artery disease)   PVD (peripheral vascular disease)    SVT (supraventricular tachycardia)      Past Surgical History:    Cataract extraction, bilateral  Peripheral arterial stent graft   Genitourinary surgery  Iliac endarterectomy and bypass  Vitrectomy parsplana with 25 gauge system, bilateral    Family History:    Father: pancreatic cancer  Mother: bladder cancer    Social History:  The patient was accompanied to the ED by daughter.  She lives in an apartment independently.  Smoking Status: Former cigarette smoker, 1 pack daily for 60 years   Smokeless Tobacco: Never Used   Alcohol Use: Positive, rarely  Marital Status:     Review of Systems   Musculoskeletal: Positive for back pain.   All other systems reviewed and  "are negative.        Physical Exam     Patient Vitals for the past 24 hrs:   BP Pulse Heart Rate Resp SpO2 Height Weight   08/12/19 1239 (!) 150/59 77 -- 16 92 % -- --   08/12/19 1224 (!) 163/69 84 -- 16 93 % -- --   08/12/19 1148 (!) 155/61 73 -- -- 90 % -- --   08/12/19 1103 (!) 166/80 -- 84 16 90 % 1.568 m (5' 1.75\") 48.5 kg (107 lb)     Physical Exam     General: The patient appears uncomfortable  Head:  The scalp, face, and head appear normal  Eyes:  The pupils are equal, round, and reactive to light    There is no nystagmus    Extraocular muscles are intact    Conjunctivae and sclerae are normal  ENT:    The nose is normal    Pinnae are normal    The oropharynx is normal    Uvula is in the midline  Neck:  Normal range of motion    There is no midline cervical spine pain/tenderness  CV:  Regular rate and underlying rhythm     Normal S1 and S2    No S3 or S4    No pathological murmur detected  Resp:  Lungs are clear    There is no tachypnea    Non-labored breathing    No rales    No wheezing   GI:  Abdomen is soft, there is no rigidity    No distension    No tympani    No rebound tenderness     Non-surgical without peritoneal features  MS:  Back:    The cervical and thoracic spine is non-tender in the midline, except the T11-T12 region    The lumbar spine is diffusely tender in the midline    The sacrum is mildly tender as well    There is minimal lumbar paraspinous muscular pain to palpation    Legs:    There is normal motor strength:     Iliopsoas, quadriceps, hamstrings, tibialis anterior, gastrocnemius, EHL    There is no sensory abnormality/paresthesia    There is no numbness    There is no radiculopathy    There is normal capillary refill to the toes    It does hurt the pelvis and sacrum when the patient attempts to move her legs  Skin:  No rash or acute skin lesions noted.  Neuro: Speech is normal and fluent    Reflexes patellar and achilles are normal.    Psych:  Awake. Alert.  Normal affect.  " Appropriate interactions.  Lymph: No anterior or posterior cervical lymphadenopathy noted    Emergency Department Course     Imaging:  Radiology findings were communicated with the patient who voiced understanding of the findings.    Lumbar spine CT w/o contrast  1. Acute relatively nondisplaced sacral fracture.  2. T12 fracture.  3. Old L4 endplate compression fracture.  4. Osteopenia.  Reading per radiology     Laboratory:  Laboratory findings were communicated with the patient who voiced understanding of the findings.    CBC: WBC 8.6, HGB 12.8,   BMP: Glucose 114(H), Calcium 8.2(L) o/w WNL (Creatinine 0.66)    Interventions:  1138 Morphine 2 mg IV  1229 Morphine 4 mg IV     Emergency Department Course:    1104 Nursing notes and vitals reviewed.    1117 I performed an exam of the patient as documented above.     1134 IV was inserted and blood was drawn for laboratory testing, results above.    1238 The patient was sent for an XR while in the emergency department, results above.     1310 Rechecked and updated.      1342 I spoke with Dr. Felder of the hospitalist service from [facility] regarding patient's presentation, findings, and plan of care.    1420 Findings and plan explained to the Patient who consents to admission. Discussed the patient with Dr. Felder, who will admit the patient to a obs bed for further monitoring, evaluation, and treatment.    Impression & Plan      Medical Decision Making:  Michelle Polo is a 87 year old female who presents to the emergency department today for evaluation of back pain as noted above.  The patient was told that she had compression fractures involving T12 and L5 but CT scan of the spine indicates a new sacral fracture and these compression fractures appear to be more chronic in nature.  Patient has no ability to ambulate or really move around whatsoever.  She will be placed in observation at this time.  Orthopedic consultation is reasonable.  Patient will  likely need  involvement for transitional care placement if she will need to be there for several weeks until she can get back up on her feet again with the use of her walker.  She needs pain control at this time as well.    Diagnosis:    ICD-10-CM    1. Closed fracture of sacrum, unspecified portion of sacrum, initial encounter (H) S32.10XA      Disposition:   The patient is admitted into the care of Dr. Felder.    Scribe Disclosure:  ABRAM, Lauren Kirkpatrick, am serving as a scribe at 1:28 PM on 8/12/2019 to document services personally performed by Gerardo Whitt MD based on my observations and the provider's statements to me.         Gerardo Whitt MD  08/12/19 [id number]

## 2019-08-12 NOTE — ED NOTES
"[facility]  ED Nurse Handoff Report    ED Chief complaint: Back Pain      ED Diagnosis:   Final diagnoses:   Closed fracture of sacrum, unspecified portion of sacrum, initial encounter (H)       Code Status: Hospitalist to address    Allergies:   Allergies   Allergen Reactions     Albuterol Palpitations       Activity level - Baseline/Home:  Stand with Assist  Activity Level - Current:   Stand with Assist    Patient's Preferred language: English   Needed?: No    Isolation: No  Infection: Not Applicable  Bariatric?: No    Vital Signs:   Vitals:    08/12/19 1103 08/12/19 1148 08/12/19 1224 08/12/19 1239   BP: (!) 166/80 (!) 155/61 (!) 163/69 (!) 150/59   Pulse:  73 84 77   Resp: 16  16 16   SpO2: 90% 90% 93% 92%   Weight: 48.5 kg (107 lb)      Height: 1.568 m (5' 1.75\")          Cardiac Rhythm: ,        Pain level: 0-10 Pain Scale: 5    Is this patient confused?: No   Does this patient have a guardian?  No         If yes, is there guardianship documents in the Epic \"Code/ACP\" activity?  N/A         Guardian Notified?  N/A  [place] - Suicide Severity Rating Scale Completed?  Yes  If yes, what color did the patient score?  White    Patient Report: Initial Complaint: Lower Right Back Pain  Focused Assessment: Pt fell on 7/31 at her home. Was seen in  and found to have a fx in her thoracic vertebrae. States she is having pain in her lower back now that she cannot tolerate. Pt has hx of COPD and wears 2L of O2 at home. Has been on 2L here. When getting up to the bedside commode she went to 76% on 2L. Put pt on 4L and pt went back to 90%. Pt baseline is 87-92%. Pt currently on 2L again. Pt is legally blind.   Tests Performed:   Results for orders placed or performed during the hospital encounter of 08/12/19   Lumbar spine CT w/o contrast    Narrative    CT LUMBAR SPINE WITHOUT CONTRAST August 12, 2019 12:39 PM     HISTORY: T12 fracture, possible old L5 fracture, clarify fractures and  any " fragments.    TECHNIQUE: Axial images were obtained through the lumbar spine without  contrast. Multiplanar reconstructions were performed. Radiation dose  for this scan was reduced using automated exposure control, adjustment  of the mA and/or kV according to patient size, or iterative  reconstruction technique..    COMPARISON: Plain radiograph dated 1/13/2016.    FINDINGS: Five lumbar type vertebral bodies are present. Posterior  alignment is normal. There is an old anterior wedge fracture through  the T12 vertebral body. There is minimal posterior protrusion of the  superior posterior portion of the T12 vertebral body and towards the  canal causing some mild central canal stenosis but no definite cord  impingement. There is also an old central endplate compression  deformity of L4 with a Schmorl's node type deformity. There is an  acute fracture through the sacrum at the S2-S3 level seen on axial  images  in the right sacrum and also axial image 104 through the  mid sacral region. This is also seen on sagittal reconstruction 38 of  series 9. Fracture is essentially nondisplaced. Multilevel  degenerative disc and facet disease is present but there is no  significant central canal stenosis. Vascular calcifications noted.  There is also vascular calcifications or tiny calculi in the left  renal pelvic region. Diffuse osteopenia noted.      Impression    IMPRESSION:  1. Acute relatively nondisplaced sacral fracture.  2. T12 fracture.  3. Old L4 endplate compression fracture.  4. Osteopenia.   CBC with platelets differential   Result Value Ref Range    WBC 8.6 4.0 - 11.0 10e9/L    RBC Count 4.22 3.8 - 5.2 10e12/L    Hemoglobin 12.8 11.7 - 15.7 g/dL    Hematocrit 41.1 35.0 - 47.0 %    MCV 97 78 - 100 fl    MCH 30.3 26.5 - 33.0 pg    MCHC 31.1 (L) 31.5 - 36.5 g/dL    RDW 14.8 10.0 - 15.0 %    Platelet Count 323 150 - 450 10e9/L    Diff Method Automated Method     % Neutrophils 81.8 %    % Lymphocytes 7.4 %    %  Monocytes 8.7 %    % Eosinophils 1.3 %    % Basophils 0.2 %    % Immature Granulocytes 0.6 %    Nucleated RBCs 0 0 /100    Absolute Neutrophil 7.1 1.6 - 8.3 10e9/L    Absolute Lymphocytes 0.6 (L) 0.8 - 5.3 10e9/L    Absolute Monocytes 0.8 0.0 - 1.3 10e9/L    Absolute Eosinophils 0.1 0.0 - 0.7 10e9/L    Absolute Basophils 0.0 0.0 - 0.2 10e9/L    Abs Immature Granulocytes 0.1 0 - 0.4 10e9/L    Absolute Nucleated RBC 0.0    Basic metabolic panel   Result Value Ref Range    Sodium 136 133 - 144 mmol/L    Potassium 4.7 3.4 - 5.3 mmol/L    Chloride 101 94 - 109 mmol/L    Carbon Dioxide 32 20 - 32 mmol/L    Anion Gap 3 3 - 14 mmol/L    Glucose 114 (H) 70 - 99 mg/dL    Urea Nitrogen 14 7 - 30 mg/dL    Creatinine 0.66 0.52 - 1.04 mg/dL    GFR Estimate 79 >60 mL/min/[1.73_m2]    GFR Estimate If Black >90 >60 mL/min/[1.73_m2]    Calcium 8.2 (L) 8.5 - 10.1 mg/dL     Abnormal Results: see above  Treatments provided: see mar    Family Comments: Daughter at bedside    OBS brochure/video discussed/provided to patient/family: Yes              Name of person given brochure if not patient:               Relationship to patient:     ED Medications:   Medications   morphine (PF) injection 2 mg (2 mg Intravenous Given 8/12/19 1138)   morphine (PF) injection 4 mg (4 mg Intravenous Given 8/12/19 1229)       Drips infusing?:  No    For the majority of the shift this patient was Green.   Interventions performed were N/A.    Severe Sepsis OR Septic Shock Diagnosis Present: No    To be done/followed up on inpatient unit:  None    ED NURSE PHONE NUMBER: [phone]

## 2019-08-12 NOTE — PLAN OF CARE
Pt arrived from ed paris 1430. Pt is legally blind. A/o. Bp elevated in 170s. On 2l oxygen. Baseline per pt. Up with 1 and a walker. Ambulated to the bathroom. Voided. Tolerated diet. Norco for pain. Pain controlled. IV saline locked. Pt/SW/Ortho consult pending. pt lives at home alone. Will continue to monitor.

## 2019-08-12 NOTE — PHARMACY-ADMISSION MEDICATION HISTORY
Admission medication history interview status for the 8/12/2019  admission is complete. See EPIC admission navigator for prior to admission medications     Medication history source reliability:Good    Actions taken by pharmacist (provider contacted, etc): Spoke with patient and daughter. Patient knew all her medications. Verified doses with [facility] pharmacist.      Additional medication history information not noted on PTA med list :  Patient stated that she is on 3 chronic medicaitons: Losartan, Simvastatin, and Diltiazem.   Started recently on 8/8/19 on Naproxen and Tylenol #3 for pain  She has Miralax at home but only uses prn     Medication reconciliation/reorder completed by provider prior to medication history? No    Time spent in this activity: 20 mins    Prior to Admission medications    Medication Sig Last Dose Taking? Auth Provider   acetaminophen-codeine (TYLENOL #3) 300-30 MG tablet Take 1 tablet by mouth every 4 hours as needed for severe pain 8/12/2019 at am Yes Unknown, Entered By History   diltiazem (CARDIZEM CD) 300 MG 24 hr CD capsule Take 1 capsule (300 mg) by mouth daily 8/12/2019 at am Yes Erik Gomez MD MD   fluticasone-salmeterol (ADVAIR DISKUS) 250-50 MCG/DOSE inhaler Inhale 1 puff into the lungs 2 times daily 8/12/2019 at am Yes Lyric Waters PA-C   losartan (COZAAR) 50 MG tablet Take 50 mg by mouth 2 times daily 8/12/2019 at am Yes Unknown, Entered By History   naproxen sodium (ANAPROX) 220 MG tablet Take 220 mg by mouth 2 times daily (with meals) 8/12/2019 at am Yes Unknown, Entered By History   polyethylene glycol (MIRALAX) powder Take 17 g by mouth daily  Patient taking differently: Take 1 capful by mouth daily as needed  prn at prn Yes Mandy Mejia MD   simvastatin (ZOCOR) 20 MG tablet Take 20 mg by mouth At Bedtime 8/11/2019 at hs Yes Unknown, Entered By History

## 2019-08-12 NOTE — ED TRIAGE NOTES
"Pt fell almost 2 weeks ago and states she is having \"terrible\" pain on her lower right back, side.   "

## 2019-08-13 NOTE — CONSULTS
Care Transition Initial Assessment - JACOB     Met with: Patient    Principal Problem:    Fracture of sacrum (H)  Active Problems:    Paroxysmal SVT    COPD (chronic obstructive pulmonary disease) (H)    Vert comp fractures (T12 and L4) -- probably old     Blind       DATA  Lives With: alone   Living Arrangements: apartment  Quality of Family Relationships: supportive, involved  Description of Support System: Involved, Supportive  Who is your support system?: Children  Support Assessment: Adequate family and caregiver support.   Identified issues/concerns regarding health management: discharge planning, TCU recommended, pt in agreement.         Quality of Family Relationships: supportive, involved  Transportation Anticipated: family or friend will provide    ASSESSMENT  Cognitive Status:  awake, alert, oriented / forgetful   Concerns to be addressed: Per SW consult for discharge planning. Pt admitted 8/12/19 with a sacral fracture. Discharge pending medical clearance and placement. Pt lives alone in an apartment and is legally blind. Pt has a son and daughter who are supportive and help with shopping and errands as needed. JACOB has met with pt and shared recommendations for TCU. Pt is in agreement and informs that she has been to [facility] in the past, would like referral sent here, shared room is fine. Pt has [facility] and will need pre-auth, referral sent. Pt believes her children will be available to transport her at discharge. Pt stating it is okay for JACOB to call and update her children, JACOB called and updated daughter Zohreh who is in agreement with the plan. Per daughter if [facility] is not available, would like referral to [facility]. Pt has portable O2 tank here at [facility].    ADDENDUM:  JACOB called and spoke with Hermila who is reviewing referral.   JACOB left VM for [facility] regarding referral.        PLAN  Financial costs for the patient includes: N/A  Patient given options and choices for discharge: TCU  Patient/family is  agreeable to the plan?  Yes  Transportation/person available to transport on day of discharge is family and have they been notified/set up.  Patient Goals and Preferences: [facility]  Patient anticipates discharging to: TCU      LEONID Gonsales

## 2019-08-13 NOTE — PROGRESS NOTES
"   08/13/19 1005   Quick Adds   Type of Visit Initial PT Evaluation   Living Environment   Lives With alone   Living Arrangements apartment   Home Accessibility no concerns  (no stairs; short hallway)   Living Environment Comment apt   Self-Care   Usual Activity Tolerance good   Current Activity Tolerance poor   Regular Exercise No   Equipment Currently Used at Home none  (has a cane if needed)   Activity/Exercise/Self-Care Comment patient does own cleaning, cares; mobility   Functional Level Prior   Ambulation 0-->independent   Transferring 0-->independent   Toileting 1-->assistive equipment   Bathing 1-->assistive equipment   Communication 0-->understands/communicates without difficulty   Swallowing 0-->swallows foods/liquids without difficulty   Cognition 0 - no cognition issues reported   Fall history within last six months yes   Number of times patient has fallen within last six months 1   General Information   Onset of Illness/Injury or Date of Surgery - Date 08/12/19   Referring Physician Jose R Felder MD   Patient/Family Goals Statement not stated   Pertinent History of Current Problem (include personal factors and/or comorbidities that impact the POC) per chart: \"Michelle Polo is a 87 year old female with COPD on O2, who presents with pain and difficulty walking after fall\"; found to have a sacral fracture; reports fall happened on 7/31; see medical record for further information   Precautions/Limitations fall precautions;oxygen therapy device and L/min  (2L)   Weight-Bearing Status - RLE weight-bearing as tolerated   General Observations use of O2; 2L prn and currently   Cognitive Status Examination   Orientation person;place  (thought it was July; knew year)   Level of Consciousness alert   Follows Commands and Answers Questions 100% of the time   Personal Safety and Judgment at risk behaviors demonstrated   Memory impaired   Cognitive Comment appears forgetful and slow to answer some questions "   Pain Assessment   Patient Currently in Pain No  (at rest)   Posture    Posture Comments forward flexed in sitting/standing; leaning L secondary to pain in R buttock area   Range of Motion (ROM)   ROM Comment some pain with heelslides when in supine   Strength   Strength Comments functionally weaker than baseline; further limited by pain in R LE > L   Bed Mobility   Bed Mobility Comments min A for supine to sit with HOB elevated to 30 degrees for comfort/breathing   Transfer Skills   Transfer Comments min/CGA for sit<>stand; use of walker; safety cues; pain in R LE   Gait   Gait Comments able to ambulate only 30 feet in room; limited by pain and SOB; O2 sats decreased from 87% on 2L to 68% on room air (didn't want to wear O2 as she doesn't at home); took 4L and 4 minutes to recover to upper 80's   Balance   Balance Comments current need for walker and assist; no gross LOB   General Therapy Interventions   Intervention Comments 1x eval only; defer further treatment to TCU   Clinical Impression   Criteria for Skilled Therapeutic Intervention evaluation only   PT Diagnosis impaired gait/transfers   Influenced by the following impairments pain; sacral fracture; forgetful; legally blind; decreased activity tolerance; functional weakness; unable to care for self in current condition   Functional limitations due to impairments impaired independence with functional mobility   Clinical Presentation Evolving/Changing   Clinical Presentation Rationale clinical judgement;   Clinical Decision Making (Complexity) Low complexity   Therapy Frequency Other (see comments)  (1x eval only for disposition; defer further treatment to TCU)   Predicted Duration of Therapy Intervention (days/wks) 1x eval only for disposition   Anticipated Equipment Needs at Discharge front wheeled walker   Anticipated Discharge Disposition Transitional Care Facility   Risk & Benefits of therapy have been explained Yes   Patient, Family & other staff in  "agreement with plan of care Yes   [facility] TM \"6 Clicks\"   2016, Trustees of [facility], under license to [employer].  All rights reserved.   6 Clicks Short Forms Basic Mobility Inpatient Short Form   [facility]  \"6 Clicks\" V.2 Basic Mobility Inpatient Short Form   1. Turning from your back to your side while in a flat bed without using bedrails? 3 - A Little   2. Moving from lying on your back to sitting on the side of a flat bed without using bedrails? 3 - A Little   3. Moving to and from a bed to a chair (including a wheelchair)? 3 - A Little   4. Standing up from a chair using your arms (e.g., wheelchair, or bedside chair)? 3 - A Little   5. To walk in hospital room? 3 - A Little   6. Climbing 3-5 steps with a railing? 3 - A Little   Basic Mobility Raw Score (Score out of 24.Lower scores equate to lower levels of function) 18   Total Evaluation Time   Total Evaluation Time (Minutes) 20  (evaluation only)     "

## 2019-08-13 NOTE — PROGRESS NOTES
Observation Goals  -diagnostic tests and consults completed and resulted-Not met     -vital signs normal or at patient baseline-Not met, BP elevated 164/50    Nurse to notify provider when observation goals have been met and patient is ready for discharge.

## 2019-08-13 NOTE — PROGRESS NOTES
Acute Pain After a Mechanical Fall      1.  Pain controlled with oral analgesia: Yes. Norco 1 tab given ealry evening. Pain is down to 2/10  and able to lift legs up with no pain      2.  Vital signs stable: Yes SBP is lower from 170's to 150's      3.  Diagnotic testing complete: Yes      4.  Cleared from consultants (if applicable): No Needs PT, OT and Ortho consult.  Patient is not looking forward about it      5. Return to near baseline physical activity: Patient tolerated  walking to the bathroom early afternoon with one assist and using a walker.

## 2019-08-13 NOTE — PLAN OF CARE
Observation goals PRIOR TO DISCHARGE     Comments:     -Diagnostic tests and consults completed and resulted -Not met    -Vital signs normal or at patient baseline -Not met, systolic B.P  elevated- 158/47    Nurse to notify provider when observation goals have been met and patient is ready for discharge.        Pt is A & O x 4. She is  legally blind.  SBp elevated @150's and 140's overnight. 02@ 92-94% on 2L NC. Baseline per pt. Denies any pain. Refused to get up and denied any urge to void, bladder scan at 0600 was 146ml. Tolerated diet. PIV saline locked.Plan is  PT/OT/SW/Ortho consult today. Pt. lives alone in an apt.  Possible TCU  placement after discharge.

## 2019-08-13 NOTE — PLAN OF CARE
Pt a/o. Legally blind. Up with 1 to the bathroom. Sacral and back pain from the fracture. Prn pain meds as needed. On 2-3L oxygen due to COPD.refused ice or heat pack for pain control. Brace on by orthotics. Pt son and dtr updated. PT recommended TCU. Pt lives alone. Will monitor.

## 2019-08-13 NOTE — PROGRESS NOTES
Observation Goals  -diagnostic tests and consults completed and resulted-Consults completed    -vital signs normal or at patient baseline-Not met, BP elevated 142/42, oxygen falls in 70s with activity    Nurse to notify provider when observation goals have been met and patient is ready for discharge.

## 2019-08-13 NOTE — CONSULTS
[facility]    Orthopedic Consultation    Michelle Polo MRN# [id number]   Age: 87 year old YOB: 1931     Date of Admission: 8/12/2019    Reason for consult: Right Sacral fracture  T12 compression fracture       Requesting physician: Jose R Felder MD       Level of consult: Consult, follow and place orders           Assessment and Plan:   Assessment:   Right Sacral fracture  T12 compression fracture  Old L4 compression fracture      Plan:   Non-operative management  WBAT Bilateral LEs.  Low back corset brace ordered to be worn when out of bed, transfers  PT/OT to progress as able  Continue pain medication as needed and scheduled Naprosyn.  Follow-up with Dr Hilton to recheck spine in 2 weeks  Ok to discharge when pain controlled/progressing in PT           Chief Complaint:   Low back pain         History of Present Illness:   87 year old female with COPD on O2, paroxysmal SVT, and legally blind.  Patient fell backward on 7/31/19 when trying to open her refrigerator door and landed on her buttocks, and ever since then has had severe pain over her sacral area and has been difficult to walk.  She denies having leg numbness but does report her right leg pain is worse than her left.  Reports history of LBP In the past.  Also states she was given 5 days of prednisone when first seen for her current LBP.  She is uncertain if this was beneficial.            Past Medical History:     Past Medical History:   Diagnosis Date     Anemia      Bilateral leg edema      COPD (chronic obstructive pulmonary disease) (H)      Dyslipidemia      Hypercholesteremia      Hypertension      Non-STEMI (non-ST elevated myocardial infarction) (H)     2-2014     Oxygen dependent      PVD (peripheral vascular disease) (H)      SVT (supraventricular tachycardia) (H)      UTI (lower urinary tract infection)     2-2014             Past Surgical History:     Past Surgical History:   Procedure Laterality Date      COLONOSCOPY N/A 2/11/2015    Procedure: COMBINED COLONOSCOPY, SINGLE OR MULTIPLE BIOPSY/POLYPECTOMY BY BIOPSY;  Surgeon: Reynold Maldonado MD;  Location:  GI     GENITOURINARY SURGERY       OTHER SURGICAL HISTORY      iliac endarterectomy and bypass     VITRECTOMY PARSPLANA WITH 25 GAUGE SYSTEM Left 12/9/2015    Procedure: VITRECTOMY PARSPLANA WITH 25 GAUGE SYSTEM;  Surgeon: David Santana MD;  Location:  [facility]     VITRECTOMY PARSPLANA WITH 25 GAUGE SYSTEM Right 2/11/2016    Procedure: VITRECTOMY PARSPLANA WITH 25 GAUGE SYSTEM;  Surgeon: David Santana MD;  Location:  [facility]             Social History:     Social History     Tobacco Use     Smoking status: Former Smoker     Packs/day: 1.00     Years: 60.00     Pack years: 60.00     Types: Cigarettes   Substance Use Topics     Alcohol use: No             Family History:     Family History   Problem Relation Age of Onset     Bladder Cancer Mother      Pancreatic Cancer Father              Immunizations:     VACCINE/DOSE   Diptheria   DPT   DTAP   HBIG   Hepatitis A   Hepatitis B   HIB   Influenza   Measles   Meningococcal   MMR   Mumps   Pneumococcal   Polio   Rubella   Small Pox   TDAP   Varicella   Zoster             Allergies:     Allergies   Allergen Reactions     Albuterol Palpitations             Medications:     Current Facility-Administered Medications   Medication     acetaminophen (TYLENOL) tablet 1,000 mg     diltiazem ER (DILT-XR) 24 hr capsule 300 mg     fluticasone-vilanterol (BREO ELLIPTA) 200-25 MCG/INH inhaler 1 puff     losartan (COZAAR) tablet 50 mg     melatonin tablet 1 mg     naloxone (NARCAN) injection 0.1-0.4 mg     naproxen (NAPROSYN) tablet 250 mg     ondansetron (ZOFRAN-ODT) ODT tab 4 mg    Or     ondansetron (ZOFRAN) injection 4 mg     oxyCODONE (ROXICODONE) tablet 5 mg     polyethylene glycol (MIRALAX/GLYCOLAX) powder 17 g     senna-docusate (SENOKOT-S/PERICOLACE) 8.6-50 MG per tablet 2 tablet     simvastatin (ZOCOR) tablet 20  mg             Review of Systems:   CV: NEGATIVE for chest pain, palpitations or peripheral edema  C: NEGATIVE for fever, chills, change in weight  E/M: NEGATIVE for ear, mouth and throat problems  R: NEGATIVE for significant cough or SOB          Physical Exam:   All vitals have been reviewed  Patient Vitals for the past 24 hrs:   BP Temp Temp src Pulse Heart Rate Resp SpO2   08/13/19 1048 -- -- -- -- -- -- 90 %   08/13/19 1047 -- -- -- -- -- -- (!) 74 %   08/13/19 0843 (!) 147/48 96.3  F (35.7  C) Oral -- 60 16 91 %   08/13/19 0745 (!) 164/50 95.9  F (35.5  C) Oral 64 -- -- (!) 89 %   08/13/19 0400 (!) 149/50 96.4  F (35.8  C) Oral -- 57 16 93 %   08/13/19 0000 (!) 158/47 95.7  F (35.4  C) Oral -- 63 18 94 %   08/12/19 2130 -- -- -- -- -- 20 90 %   08/12/19 1940 (!) 154/52 96  F (35.6  C) Oral -- 93 18 93 %   08/12/19 1700 (!) 154/52 -- -- 69 -- -- --   08/12/19 1529 (!) 172/55 96.7  F (35.9  C) Oral 76 -- 16 92 %   08/12/19 1433 (!) 175/56 96.4  F (35.8  C) Oral 90 -- 16 92 %   08/12/19 1406 -- 98.5  F (36.9  C) Temporal -- -- -- --   08/12/19 1405 (!) 157/64 -- -- 82 82 16 96 %   08/12/19 1239 (!) 150/59 -- -- 77 -- 16 92 %   08/12/19 1224 (!) 163/69 -- -- 84 -- 16 93 %   08/12/19 1148 (!) 155/61 -- -- 73 -- -- 90 %       Intake/Output Summary (Last 24 hours) at 8/13/2019 1147  Last data filed at 8/13/2019 1111  Gross per 24 hour   Intake --   Output 700 ml   Net -700 ml     On physical exam, patient is resting in bed with her knees flexed.  There is no skin disruption visible.  She denies pain with hip range of motion on the left versus right.  She has slight increased right leg pain with straight leg raising.  Negative left straight leg raise.  Reports equal sensation to light touch on the right versus left lower extremities.  She is able to resist knee flexion extension ankle dorsi and plantar flexion and great toe extension.  Distal pulses are intact equal bilaterally.  She is tender to palpation along her  low thoracic/upper lumbar spine.          Data:   All laboratory data reviewed  Results for orders placed or performed during the hospital encounter of 08/12/19   Lumbar spine CT w/o contrast    Narrative    CT LUMBAR SPINE WITHOUT CONTRAST August 12, 2019 12:39 PM     HISTORY: T12 fracture, possible old L5 fracture, clarify fractures and  any fragments.    TECHNIQUE: Axial images were obtained through the lumbar spine without  contrast. Multiplanar reconstructions were performed. Radiation dose  for this scan was reduced using automated exposure control, adjustment  of the mA and/or kV according to patient size, or iterative  reconstruction technique..    COMPARISON: Plain radiograph dated 1/13/2016.    FINDINGS: Five lumbar type vertebral bodies are present. Posterior  alignment is normal. There is an old anterior wedge fracture through  the T12 vertebral body. There is minimal posterior protrusion of the  superior posterior portion of the T12 vertebral body and towards the  canal causing some mild central canal stenosis but no definite cord  impingement. There is also an old central endplate compression  deformity of L4 with a Schmorl's node type deformity. There is an  acute fracture through the sacrum at the S2-S3 level seen on axial  images  in the right sacrum and also axial image 104 through the  mid sacral region. This is also seen on sagittal reconstruction 38 of  series 9. Fracture is essentially nondisplaced. Multilevel  degenerative disc and facet disease is present but there is no  significant central canal stenosis. Vascular calcifications noted.  There is also vascular calcifications or tiny calculi in the left  renal pelvic region. Diffuse osteopenia noted.      Impression    IMPRESSION:  1. Acute relatively nondisplaced sacral fracture.  2. T12 fracture.  3. Old L4 endplate compression fracture.  4. Osteopenia.    ZACH LEWIS MD   CBC with platelets differential   Result Value Ref Range     WBC 8.6 4.0 - 11.0 10e9/L    RBC Count 4.22 3.8 - 5.2 10e12/L    Hemoglobin 12.8 11.7 - 15.7 g/dL    Hematocrit 41.1 35.0 - 47.0 %    MCV 97 78 - 100 fl    MCH 30.3 26.5 - 33.0 pg    MCHC 31.1 (L) 31.5 - 36.5 g/dL    RDW 14.8 10.0 - 15.0 %    Platelet Count 323 150 - 450 10e9/L    Diff Method Automated Method     % Neutrophils 81.8 %    % Lymphocytes 7.4 %    % Monocytes 8.7 %    % Eosinophils 1.3 %    % Basophils 0.2 %    % Immature Granulocytes 0.6 %    Nucleated RBCs 0 0 /100    Absolute Neutrophil 7.1 1.6 - 8.3 10e9/L    Absolute Lymphocytes 0.6 (L) 0.8 - 5.3 10e9/L    Absolute Monocytes 0.8 0.0 - 1.3 10e9/L    Absolute Eosinophils 0.1 0.0 - 0.7 10e9/L    Absolute Basophils 0.0 0.0 - 0.2 10e9/L    Abs Immature Granulocytes 0.1 0 - 0.4 10e9/L    Absolute Nucleated RBC 0.0    Basic metabolic panel   Result Value Ref Range    Sodium 136 133 - 144 mmol/L    Potassium 4.7 3.4 - 5.3 mmol/L    Chloride 101 94 - 109 mmol/L    Carbon Dioxide 32 20 - 32 mmol/L    Anion Gap 3 3 - 14 mmol/L    Glucose 114 (H) 70 - 99 mg/dL    Urea Nitrogen 14 7 - 30 mg/dL    Creatinine 0.66 0.52 - 1.04 mg/dL    GFR Estimate 79 >60 mL/min/[1.73_m2]    GFR Estimate If Black >90 >60 mL/min/[1.73_m2]    Calcium 8.2 (L) 8.5 - 10.1 mg/dL          Attestation:  I have reviewed today's vital signs, notes, medications, labs and imaging with Dr. Hilton.  Amount of time performed on this consult: 30 minutes.    Nika Johns PA-C

## 2019-08-13 NOTE — PROGRESS NOTES
[facility]    Hospitalist Progress Note    Date of Service (when I saw the patient): 08/13/2019    Assessment & Plan   Michelle Polo is a 87 year old female with COPD on O2, who presents with pain and difficulty walking after fall.     Fracture of sacrum -- suspect this occurred when she fell backward on 7/31/19, and this is why she is having difficulty walking.    --Start scheduled tylenol 1,000 mg TID  --Oxycodone 5 mg q4h prn  --Consider trying lidocaine as adjuvant  --Ice and heat as tolerated  --PT eval, rec TCU  --Ortho consulted:   --Non-operative management   --WBAT Bilateral LEs.   --Low back corset brace ordered to be worn when out of bed, transfers   --PT/OT to progress as able   --Continue pain medication as needed and scheduled Naprosyn.   --Follow-up with Dr Hilton to recheck spine in 3-4 weeks        Vertebral compression fractures (T12 and L4)  --Likely old.  Conservative mgmt.                Paroxysmal SVT  Stable  --Continue Diltiazem 300 mg daily      COPD (chronic obstructive pulmonary disease)  Stable.  Uses prn at home.  --On home O2 at 2 LPM per nasal canula with activity and prn.  --Intermittent SpO2 checks     Blindness  --Will need TCU as above.     DVT Prophylaxis: Pneumatic Compression Devices  Code Status: DNR / DNI     Disposition: Expected discharge in 1-2 days pending pain control and TCU placement found.    Faraz Gerardo     Interval History   Pt lying in bed on my arrival.  States she is relatively comfortable at rest, but has severe pain when moving or trying to walk.  Not feeling very well.  Generally weak and tired.  Desats with activity, per patient this is her normal.  Denies sob, cp, dizziness, focal weakness, fever.    -Data reviewed today: I reviewed all new labs and imaging results over the last 24 hours.     Physical Exam   Temp: 95.9  F (35.5  C) Temp src: Oral BP: (!) 164/50 Pulse: 64 Heart Rate: 57 Resp: 16 SpO2: (!) 85 % O2 Device: Nasal cannula  Oxygen Delivery: 2 LPM  Vitals:    08/12/19 1103   Weight: 48.5 kg (107 lb)     Vital Signs with Ranges  Temp:  [95.7  F (35.4  C)-98.5  F (36.9  C)] 95.9  F (35.5  C)  Pulse:  [64-90] 64  Heart Rate:  [57-93] 57  Resp:  [16-20] 16  BP: (149-175)/(47-80) 164/50  SpO2:  [89 %-96 %] 89 %  No intake/output data recorded.    Constitutional: Elderly appearing female, alert, oriented to person, place, situation.  Cooperative, lying in bed in NAD.   Respiratory:  Lung sounds diminished bilaterally, clear.  No crackles, wheezes, or rhonchi, no labored breathing.  Cardiovascular:  Heart RRR, no MRG, no edema.   GI:  Abdomen soft, NT/ND and with normoactive BS  Skin/Integumen:  Warm, dry, non-diaphoretic.  MSK: CMS x4 intact.    Medications       diltiazem ER  300 mg Oral Daily     fluticasone-vilanterol  1 puff Inhalation Daily     losartan  50 mg Oral BID     naproxen  250 mg Oral BID w/meals     senna-docusate  2 tablet Oral BID     simvastatin  20 mg Oral At Bedtime       Data   Recent Labs   Lab 08/12/19  1134   WBC 8.6   HGB 12.8   MCV 97         POTASSIUM 4.7   CHLORIDE 101   CO2 32   BUN 14   CR 0.66   ANIONGAP 3   MICHELLE 8.2*   *       Recent Results (from the past 24 hour(s))   Lumbar spine CT w/o contrast    Narrative    CT LUMBAR SPINE WITHOUT CONTRAST August 12, 2019 12:39 PM     HISTORY: T12 fracture, possible old L5 fracture, clarify fractures and  any fragments.    TECHNIQUE: Axial images were obtained through the lumbar spine without  contrast. Multiplanar reconstructions were performed. Radiation dose  for this scan was reduced using automated exposure control, adjustment  of the mA and/or kV according to patient size, or iterative  reconstruction technique..    COMPARISON: Plain radiograph dated 1/13/2016.    FINDINGS: Five lumbar type vertebral bodies are present. Posterior  alignment is normal. There is an old anterior wedge fracture through  the T12 vertebral body. There is minimal  posterior protrusion of the  superior posterior portion of the T12 vertebral body and towards the  canal causing some mild central canal stenosis but no definite cord  impingement. There is also an old central endplate compression  deformity of L4 with a Schmorl's node type deformity. There is an  acute fracture through the sacrum at the S2-S3 level seen on axial  images  in the right sacrum and also axial image 104 through the  mid sacral region. This is also seen on sagittal reconstruction 38 of  series 9. Fracture is essentially nondisplaced. Multilevel  degenerative disc and facet disease is present but there is no  significant central canal stenosis. Vascular calcifications noted.  There is also vascular calcifications or tiny calculi in the left  renal pelvic region. Diffuse osteopenia noted.      Impression    IMPRESSION:  1. Acute relatively nondisplaced sacral fracture.  2. T12 fracture.  3. Old L4 endplate compression fracture.  4. Osteopenia.    ZACH LEWIS MD

## 2019-08-13 NOTE — PLAN OF CARE
Pt is A & O x 4. She is  legally blind.  SBp elevated @150's and 140's overnight. 02@ 92-94% on 2L NC. Baseline per pt. Pain controlled with scheduled tylenol and prn oxycodone. Difficulty when sitting up to eat and use the bathroom. Uses gait belt and walker with 1 assist. Tolerated diet. PIV saline locked.Had PT and ortho consults today. Pt. lives alone in an apt.  Possible TCU  placement after discharge.

## 2019-08-13 NOTE — PROGRESS NOTES
"S: Patient was seen today at [facility] for eval/fitting for an LSO corset as ordered. T12 Compression Fx  OG:  Reduce pain and motion of the lumbar spine  A: At this time I have fit pt with a size 32-38\"panel LSO corset with Velcro closures. The pt was instructed on donning/doffing; care and cleaning of the LSO was explained. Care was taken in selection of the proper size LSO to insure an intimate fit.  Upon completion of the initial fitting the patient had no complaints, and the fit of the orthosis appeared to be satisfactory at this time.   P: The patient was instructed to call if any problems or questions arise.   Kodi NOLEN LO        "

## 2019-08-13 NOTE — PROGRESS NOTES
Patient slightly short of breath.  Lung sounds diminished on the bases.  Patient stated she took her once a day inhaler this morning while at home.  O2 sat between 89-90% on 2L/NC. which patient verbalized to be normal for her.  [facility] Later she was repositioned in bed. Patient felt a little bit better.and verbalized she wanted to go to sleep. )2 sat between 90-92 on 2l NC.

## 2019-08-13 NOTE — PROGRESS NOTES
Observation Goals  -diagnostic tests and consults completed and resulted-Consults completed    -vital signs normal or at patient baseline- partially met. Oxygen desats into low 80s with ambulation. Comes back up to 89% when rested  Nurse to notify provider when observation goals have been met and patient is ready for discharge.

## 2019-08-13 NOTE — PLAN OF CARE
Discharge Planner OT   Patient plan for discharge: -  Current status: order received. Per discussion with PT, pt currently requiring A for all mobility and self cares. Per PT, pt lives alone and will need TCU at discharge as she is unable to care for self at this time. Will defer OT eval to next level of care.   Barriers to return to prior living situation: defer to PT   Recommendations for discharge: defer to PT   Rationale for recommendations: pt stay anticipated to be short as pt admitted under observation status. Will defer OT eval to next level of care as pt unsafe to return to prior living situation.        Entered by: Jocelyn Chauhan 08/13/2019 11:43 AM

## 2019-08-13 NOTE — PLAN OF CARE
Observation goals PRIOR TO DISCHARGE     Comments:     -Diagnostic tests and consults completed and resulted -Not met    -Vital signs normal or at patient baseline -Not met, systolic B.P  elevated- 158/47    Nurse to notify provider when observation goals have been met and patient is ready for discharge.

## 2019-08-13 NOTE — PLAN OF CARE
Discharge Planner PT   Patient plan for discharge: not stated  Current status: PT: Order received; 1x evaluation completed for disposition planning. Patient under Observation cares after a fall, difficulty walking; found to have a fracture of the sacrum.  Prior to admit patient reports being legally blind but was independent with all mobility without an assistive device, able to do own cares and housekeeping of her apt; Lives alone in an apt with no stairs; son or daughter does grocery shopping for her. On eval patient needing min A for bed mobility; mild SOB with activity; O2 sats 87% on 2L; sit>stand with min/CGA and safety cues with use of walker; only able to tolerate gait distance of 30 feet in room secondary to pain; mod SOB with this distance and O2 sats decreased to 68% (on room air secondary to wanting to trial no O2- doesn't regularly use; only prn); took 4 minutes to return to upper 80's on 4L; returned to 2L; nurse and PA made aware; up in chair for breakfast at end of session but having difficulty weightbearing on R buttock in sitting. Will defer further treatment to next level of care.  Barriers to return to prior living situation: lives alone, current need for assist for all mobility; unable to care for self in current condition; high falls risk; poor activity tolerance  Recommendations for discharge: TCU  Rationale for recommendations: Patient would benefit from ongoing PT to maximize return of independence with functional mobility and tolerance to activity to allow for eventual return home alone.       Entered by: Virgen Solis 08/13/2019 10:35 AM

## 2019-08-13 NOTE — PROGRESS NOTES
[facility]      OUTPATIENT PHYSICAL THERAPY EVALUATION  PLAN OF TREATMENT FOR OUTPATIENT REHABILITATION  (COMPLETE FOR INITIAL CLAIMS ONLY)  Patient's Last Name, First Name, M.I.  YOB: 1931  Michelle Polo                        Provider's Name  [facility] Medical Record No.  [id number]                               Onset Date:  08/12/19   Start of Care Date:  08/13/19      Type:     _X_PT   ___OT   ___SLP Medical Diagnosis:  Sacral fracture s/p fall                        PT Diagnosis:  impaired gait/transfers   Visits from SOC:  1   _________________________________________________________________________________  Plan of Treatment/Functional Goals    Planned Interventions:  ,     , 1x eval only; defer further treatment to TCU     Goals: See Physical Therapy Goals on Care Plan in [employer] electronic health record.    Therapy Frequency: Other (see comments)(1x eval only for disposition; defer further treatment to TCU)  Predicted Duration of Therapy Intervention: 1x eval only for disposition  _________________________________________________________________________________    I CERTIFY THE NEED FOR THESE SERVICES FURNISHED UNDER        THIS PLAN OF TREATMENT AND WHILE UNDER MY CARE     (Physician co-signature of this document indicates review and certification of the therapy plan).                Certification date from: 08/13/19, Certification date to: 08/13/19    Referring Physician: Jose R Felder MD            Initial Assessment        See Physical Therapy evaluation dated 08/13/19 in Epic electronic health record.

## 2019-08-14 NOTE — PROGRESS NOTES
SW:  D: [facility] from [facility] informing that they do not have any beds available.     Spoke with [facility] who informs that they can clinically accept pt and they are awaiting an auth from [facility]. [facility] will call JACOB back once they receive the auth. Family to transport.     JACOB spoke with pt daughter Zohreh who is in agreement with discharge plans. Per Zohreh, [facility] medical transport is preferred at discharge. Per RN stretcher is most appropriate.     Spoke with [facility] who is still awaiting auth from [facility], [facility] will call again to check on status of auth.     ADDENDUM:  [facility] has received auth from [facility] and bed is available at anytime. JACOB has placed call to [facility] and requested stretcher transport. Next ride available at 1700.   Orders faxed to [facility].         PAS-RR     D: Per DHS regulation, JACOB completed and submitted PAS-RR to [place]  [employer] via the Senior LinkAge Line.  PAS-RR  confirmation # is : [id number]      P: Further questions may be directed to Senior LinkAge Line at #[phone], option #4 for PAS-RR staff.      P: JACOB will follow through discharge.    LEONID Gonsales

## 2019-08-14 NOTE — PLAN OF CARE
Observation Goals  -diagnostic tests and consults completed and resulted-Consults completed  -vital signs normal or at patient baseline- partially met. Oxygen desats to 76 on 2LPM per NC with ambulation. Comes back up to 89% when rested    Nurse to notify provider when observation goals have been met and patient is ready for discharge.    A&Ox4. [place]. Legally blind. VSS. PRN Oxycodone and scheduled Naproxyn and Tylenol for pain. Corset brace on while in bed per pt request and up with ambulation. Continent of bowel and bladder. Decreased appetite today, tolerated diet but ate minimal amount. Able to tolerate pudding and applesauce with medications. On 2LPM per NC at mid 90s. Portable oxygen tank sent with patient at discharge. Pt discharged to [facility]CU with [facility] transport this evening. Family aware and updated.

## 2019-08-14 NOTE — DISCHARGE SUMMARY
[facility]  Hospitalist Discharge Summary       Date of Admission:  8/12/2019  Date of Discharge:  8/14/2019  Discharging Provider: Claudia Moraes PA-C      Discharge Diagnoses   Fracture of sacrum  Deconditioning  T12 compression fracture  Old L4 compression fracture  Paroxysmal supraventricular tachycardia  Chronic obstructive pulmonary disease  Blindness    Follow-ups Needed After Discharge   Follow-up Appointments     Follow Up and recommended labs and tests      Follow up with Nursing home physician.    Follow-up with Dr Hilton to recheck spine in 2 weeks.  Call [phone] for   appointment.    --Non-operative management  --WBAT Bilateral LEs.  --Low back corset brace ordered to be worn when out of bed, transfers  --PT/OT to progress as able  --Continue pain medication as needed and scheduled Naprosyn.  --Follow-up with Dr Hilton to recheck spine in 2 weeks.  Call [phone]   for appointment.             Discharge Disposition   Discharged to TCU  Condition at discharge: Stable    Hospital Course   Michelle Polo is a 87 year old female with COPD on O2, who presents with pain and difficulty walking after fall.     Fracture of sacrum   Suspect this occurred when she fell backward on 7/31/19, and this is why she is having difficulty walking. Tolerated and was discharged to TCU with scheduled tylenol 1,000 mg TID, Oxycodone 5 mg q4h prn, naprosyn, and bowel regimen. She may use ice and heat as tolerated.  By physical therapy who recommended transitional care unit and she was subsequently accepted at [facility] with social work assisting with placement.  Orthopedics was consulted on her case and recommended the following:              --Non-operative management              --WBAT Bilateral LEs.              --Low back corset brace ordered to be worn when out of bed, transfers              --PT/OT to progress as able              --Continue pain medication as needed and scheduled  Naprosyn.              --Follow-up with Dr Hilton to recheck spine in 2 weeks.  Call [phone] for appointment.      Vertebral compression fractures   T12 compression fracture  Old L4 compression fracture  Conservative mgmt. Seen by ortho as above. She was fit for LSO brace by orthotist she should wear when she is OOB.     Hx Paroxysmal SVT: Stable. Diltiazem 300 mg daily was continued.      COPD (chronic obstructive pulmonary disease)  Stable.  Uses prn at home. On home O2 at 2 LPM per nasal canula with activity and prn. Intermittent SpO2 checks, requiring continuous use related to poor effort with inspiration at times due to her pain from the fractures.  Discussed with patient and she should be using incentive spirometry on discharge at TCU for proper lung exansion.     Blindness: Will need TCU as above.    Consultations This Hospital Stay   SOCIAL WORK IP CONSULT  PHYSICAL THERAPY ADULT IP CONSULT  OCCUPATIONAL THERAPY ADULT IP CONSULT  ORTHOPEDIC SURGERY IP CONSULT  ORTHOSIS BRACE IP CONSULT  PHYSICAL THERAPY ADULT IP CONSULT  OCCUPATIONAL THERAPY ADULT IP CONSULT    Code Status   DNR/DNI    Time Spent on this Encounter   I, Claudia Moraes, personally saw the patient today and spent greater than 30 minutes discharging this patient.       Claudia Moraes, SYLVESTER  [facility]  ______________________________________________________________________    Physical Exam   Vital Signs: Temp: 96.2  F (35.7  C) Temp src: Oral BP: (!) 154/44   Heart Rate: 62 Resp: 20 SpO2: 93 % O2 Device: Nasal cannula Oxygen Delivery: 2 LPM  Weight: 107 lbs 0 oz    General: Awake, alert, elderly  woman who appears stated age. Looks comfortable lying in bed. No acute distress.  HEENT: Normocephalic, atraumatic.    Respiratory: Diminished to anterolateral fields but no obvious wheezing, rales, or rhonchi.  2 L of baseline O2 nasal cannula in place without increased work of breathing.    Cardiovascular: Regular  rate and rhythm, +S1 and S2, no murmur auscultated. No peripheral edema.   Gastrointestinal: Soft, non-tender, non-distended. Abdominal binder in place  Skin: Warm, dry. No obvious rashes or lesions on exposed skin. Dorsalis pedis pulses palpable bilaterally.  Musculoskeletal: Limited exam related to her fractures.  Full 5/5 strength to dorsi and plantarflexion of bilateral lower extremities as well as  strength.   Neurologic: AAO x3. Cranial nerves 2-12 grossly intact, normal strength and sensation.       Primary Care Physician   Brittany Wiggins    Discharge Orders      General info for SNF    Length of Stay Estimate: Short Term Care: Estimated # of Days <30  Condition at Discharge: Stable  Level of care:skilled   Rehabilitation Potential: Fair  Admission H&P remains valid and up-to-date: Yes  Recent Chemotherapy: N/A  Use Nursing Home Standing Orders: Yes     Mantoux instructions    Give two-step Mantoux (PPD) Per Facility Policy Yes     Reason for your hospital stay    Admitted with pain after a fall 2 weeks ago and found to have fracture of sacrum as well as spinal compression fractures which may be old.     Weight bearing status    Weight bearing as tolerated to bilateral lower extremities     Activity - Up with nursing assistance     Encourage PO fluids     Additional Discharge Instructions    Incentive spirometer use Q 2 hours     Follow Up and recommended labs and tests    Follow up with Nursing home physician.    Follow-up with Dr Hilton to recheck spine in 2 weeks.  Call [phone] for appointment.    --Non-operative management  --WBAT Bilateral LEs.  --Low back corset brace ordered to be worn when out of bed, transfers  --PT/OT to progress as able  --Continue pain medication as needed and scheduled Naprosyn.  --Follow-up with Dr Hilton to recheck spine in 2 weeks.  Call [phone] for appointment.     Physical Therapy Adult Consult    Evaluate and treat as clinically indicated.    Reason:  Deconditioning  after a fall     Occupational Therapy Adult Consult    Evaluate and treat as clinically indicated.    Reason:  Deconditioning after fall     Oxygen - Nasal cannula    2 Lpm by nasal cannula to keep O2 sats 92% or greater.     Fall precautions     Advance Diet as Tolerated    Follow this diet upon discharge: Orders Placed This Encounter      Regular Diet Adult       Significant Results and Procedures   Results for orders placed or performed during the hospital encounter of 08/12/19   Lumbar spine CT w/o contrast    Narrative    CT LUMBAR SPINE WITHOUT CONTRAST August 12, 2019 12:39 PM     HISTORY: T12 fracture, possible old L5 fracture, clarify fractures and  any fragments.    TECHNIQUE: Axial images were obtained through the lumbar spine without  contrast. Multiplanar reconstructions were performed. Radiation dose  for this scan was reduced using automated exposure control, adjustment  of the mA and/or kV according to patient size, or iterative  reconstruction technique..    COMPARISON: Plain radiograph dated 1/13/2016.    FINDINGS: Five lumbar type vertebral bodies are present. Posterior  alignment is normal. There is an old anterior wedge fracture through  the T12 vertebral body. There is minimal posterior protrusion of the  superior posterior portion of the T12 vertebral body and towards the  canal causing some mild central canal stenosis but no definite cord  impingement. There is also an old central endplate compression  deformity of L4 with a Schmorl's node type deformity. There is an  acute fracture through the sacrum at the S2-S3 level seen on axial  images  in the right sacrum and also axial image 104 through the  mid sacral region. This is also seen on sagittal reconstruction 38 of  series 9. Fracture is essentially nondisplaced. Multilevel  degenerative disc and facet disease is present but there is no  significant central canal stenosis. Vascular calcifications noted.  There is also vascular  calcifications or tiny calculi in the left  renal pelvic region. Diffuse osteopenia noted.      Impression    IMPRESSION:  1. Acute relatively nondisplaced sacral fracture.  2. T12 fracture.  3. Old L4 endplate compression fracture.  4. Osteopenia.    ZACH LEWIS MD       Discharge Medications   Current Discharge Medication List      START taking these medications    Details   acetaminophen (TYLENOL) 500 MG tablet Take 2 tablets (1,000 mg) by mouth 3 times daily    Associated Diagnoses: Closed fracture of sacrum, unspecified portion of sacrum, initial encounter (H)      ondansetron (ZOFRAN-ODT) 4 MG ODT tab Take 1 tablet (4 mg) by mouth every 6 hours as needed for nausea or vomiting    Associated Diagnoses: Closed fracture of sacrum, unspecified portion of sacrum, initial encounter (H)      oxyCODONE (ROXICODONE) 5 MG tablet Take 1 tablet (5 mg) by mouth every 6 hours as needed for moderate to severe pain  Qty: 12 tablet, Refills: 0    Comments: Future refills by PCP Dr. Brittany Wiggins with phone number [phone].  Associated Diagnoses: Closed fracture of sacrum, unspecified portion of sacrum, initial encounter (H)      senna-docusate (SENOKOT-S/PERICOLACE) 8.6-50 MG tablet Take 2 tablets by mouth 2 times daily    Associated Diagnoses: Closed fracture of sacrum, unspecified portion of sacrum, initial encounter (H)         CONTINUE these medications which have NOT CHANGED    Details   diltiazem (CARDIZEM CD) 300 MG 24 hr CD capsule Take 1 capsule (300 mg) by mouth daily  Qty: 30 capsule, Refills: 1    Associated Diagnoses: SVT (supraventricular tachycardia) (H)      fluticasone-salmeterol (ADVAIR DISKUS) 250-50 MCG/DOSE inhaler Inhale 1 puff into the lungs 2 times daily  Qty: 1 Inhaler, Refills: 0      losartan (COZAAR) 50 MG tablet Take 50 mg by mouth 2 times daily      naproxen sodium (ANAPROX) 220 MG tablet Take 220 mg by mouth 2 times daily (with meals)      polyethylene glycol (MIRALAX) powder Take 17 g by  mouth daily  Qty: 510 g, Refills: 1    Comments: Hold for loose stools  Associated Diagnoses: Constipation      simvastatin (ZOCOR) 20 MG tablet Take 20 mg by mouth At Bedtime         STOP taking these medications       acetaminophen-codeine (TYLENOL #3) 300-30 MG tablet Comments:   Reason for Stopping:             Allergies   Allergies   Allergen Reactions     Albuterol Palpitations

## 2019-08-14 NOTE — PROGRESS NOTES
Orthopedic Surgery    Right Sacral fracture  T12 compression fracture      Patient resting comfortably in bed.  Reports slight decrease in pain.  States the corset brace is beneficial.      Physical exam unchanged:    She denies pain with hip range of motion on the left versus right.  She has slight increased right leg pain with straight leg raising.  Negative left straight leg raise.  Reports equal sensation to light touch on the right versus left lower extremities.  She is able to resist knee flexion extension ankle dorsi and plantar flexion and great toe extension.  Distal pulses are intact equal bilaterally.  She is tender to palpation along her low thoracic/upper lumbar spine.      Assessment:    Right Sacral fracture  T12 compression fracture  Old L4 compression fracture      Plan:    Non-operative management  WBAT Bilateral LEs.  Low back corset brace ordered to be worn when out of bed, transfers.  Can be off in bed.   PT/OT to progress as able  Continue pain medication as needed  Follow-up with Dr Hilton to recheck spine in 2 weeks.  Call [phone] for appointment.   Ok to discharge per ortho when pain controlled/progressing in PT

## 2019-08-14 NOTE — PLAN OF CARE
Observation Goals  -diagnostic tests and consults completed and resulted-Consults completed  -vital signs normal or at patient baseline- partially met. Oxygen desats to 76 on 2LPM per NC with ambulation. Comes back up to 89% when rested    Nurse to notify provider when observation goals have been met and patient is ready for discharge.

## 2019-08-14 NOTE — PLAN OF CARE
A/O x4, legally blind. AVSS on 2.L of O2 except HTN, 171/53. C/o lower back pain radiating to legs, tylenol and oxycodone given. LS diminished. Ax1, brace, GB, walker, to BR, WBAT. Regular diet. Discharge pending TCU placement. Will continue to monitor.

## 2019-08-27 NOTE — PROGRESS NOTES
Pt received 3 nebs per MD order. BS diminished. Pt received all nebs inline on BiPAP. Pt tolerating well.   HR has been in mid 80s t/o treatments.    RT will continue to monitor and follow.    8/27/2019 4:41 PM    Jaylyn Roman, RT

## 2019-08-27 NOTE — ED PROVIDER NOTES
History     Chief Complaint:  Shortness of Breath    The history is provided by the patient. The history is limited by the condition of the patient.      Michelle Polo is a 87 year old female with a history of COPD normally on 2 liters of oxygen at home who presents with shortness of breath. The patient presents after being seen at [facility] for pneumonia. The patient was started on antibiotics for pneumonia 2 days ago and has not improved. The patient was very short of breat today and requiring more oxygen than usual. The patient denies chest pain, nausea, vomiting,or abdominal pain.     Allergies:  Albuterol     Medications:    Cardizem   Advair   Cozaar  Senokot   Zocor     Past Medical History:    Anemia   Bilateral leg edema   COPD (chronic obstructive pulmonary disease)   Dyslipidemia   Hypercholesteremia   Hypertension   Non-STEMI (non-ST elevated myocardial infarction)    Oxygen dependent   PVD (peripheral vascular disease)    SVT (supraventricular tachycardia)    UTI (lower urinary tract infection)     Past Surgical History:     surgery   Iliac endarterectomy and bypass  Vitrectomy     Family History:    Mother:Cancer  Father: Cancer    Social History:  Smoking Status: Former Smoker  Smokeless Tobacco: Never Used  Alcohol Use: Positive    Drug use: Negative    Marital Status:       Review of Systems   Respiratory: Positive for shortness of breath.    Cardiovascular: Negative for chest pain.   Gastrointestinal: Negative for abdominal pain, nausea and vomiting.   All other systems reviewed and are negative.        Physical Exam     Patient Vitals for the past 24 hrs:   BP Temp Temp src Pulse Heart Rate Resp SpO2 Height Weight   08/27/19 2200 (!) 148/51 -- -- 82 78 12 92 % -- --   08/27/19 2130 133/45 -- -- 78 78 10 91 % -- --   08/27/19 2100 139/47 -- -- 74 82 16 94 % -- --   08/27/19 2030 128/45 -- -- 82 80 12 92 % -- --   08/27/19 1930 (!) 148/50 -- -- 84 82 17 97 % -- --   08/27/19 1900 (!) 142/53  "-- -- 83 86 16 98 % -- --   08/27/19 1830 (!) 143/57 -- -- 93 96 18 98 % -- --   08/27/19 1800 (!) 143/58 -- -- 85 92 15 98 % -- --   08/27/19 1730 130/56 -- -- 89 -- -- 98 % -- --   08/27/19 1700 119/47 -- -- 86 -- -- 98 % -- --   08/27/19 1631 -- -- -- -- -- -- 100 % -- --   08/27/19 1630 129/50 -- -- 85 -- -- 100 % -- --   08/27/19 1625 -- -- -- -- -- -- 100 % -- --   08/27/19 1555 (!) 113/92 97.9  F (36.6  C) Oral -- 94 26 91 % 1.568 m (5' 1.75\") 48.5 kg (107 lb)      Physical Exam  Constitutional: Well developed, acute on chronic ill appearance  Head: Atraumatic.   Mouth/Throat: Oropharynx is clear and moist.   Neck:  no stridor  Eyes: no scleral icterus  Cardiovascular: RRR, 2+ bilat radial pulses  Pulmonary/Chest:, Diminished breath sounds, tachypnea  Abdominal: ND, +BS, soft, NT, no rebound or guarding   Ext: Warm, well perfused, no edema  Neurological: A&O, symmetric facies, moves ext x4  Skin: Skin is warm and dry.   Psychiatric: Anxious.  Behavior is normal. Thought content normal.   Nursing note and vitals reviewed.     Emergency Department Course     ECG:  ECG taken at 1612, ECG read at 1613  Sinus rhythm with premature atrial complexes  Septal infarct, age undetermined  Abnormal ECG  Rate 93 bpm. RI interval 166 ms. QRS duration 54 ms. QT/QTc 348/432 ms. P-R-T axes 90 56 80.  No significant change when compared to EKG dated 12/15/14.     Imaging:  Radiology findings were communicated with the patient who voiced understanding of the findings.    CT Chest Pulmonary Embolism w Contrast  1. Moderately large bilateral pleural effusions.  2. No evidence for acute pulmonary embolus.  3. 1.2 cm left lower lobe groundglass nodule. Nonspecific, may be  focal infection or inflammation. Malignancy such as adenocarcinoma  could have a similar appearance. Three-month follow-up and correlation  with any more recent chest CTs for stability.  4. Further evaluation to rule out 3 cm upper left renal mass " versus  lobulation.  Reading per radiology     XR Chest Port 1 View  1. Increase in small pleural effusions.  2. Mild cardiomegaly, diffuse increased interstitial markings.  3. Mild focal infiltrate or atelectasis right lung base is new.  DEVIKA GREENE MD  Reading per radiology      Laboratory:  Laboratory findings were communicated with the patient who voiced understanding of the findings.    CBC: WBC 11.7 (H), HGB 11 (L),   CMP: Carbon dioxide 36 (H), Anion gap 1 (L), Urea nitrogen 37 (H), Albumin 2.9 (L), protein total 6.2 (L) o/w WNL (Creatinine 0.69)  Troponin (Collected 1603): <0.015   BNP: 967  ISTAT gases lactate sindi POCT: pH: 7.37, PCO2: 64 (H), PO2: 60 (H), Bicarbonate: 37 (H), O2 Sat: 89, Lactic acid: 0.7  Procalcitonin: <0.05  D Dimer: 2.4 (H)     Interventions:  1619 Solumedrol 81.25 mg IV  1639 Duoneb 3 mL Nebulization    1659 Magnesium sulfate 2 g IV  2058 Morphine 4 mg IV  2230 Zosyn 4.5 mg IV    Emergency Department Course:    1603 Nursing notes and vitals reviewed.    1603 IV was inserted and blood was drawn for laboratory testing, results above.     1608 I performed an exam of the patient as documented above.     1612 EKG obtained as noted above.     1658 The patient was sent for a chest x-ray while in the emergency department, results above.       1945 The patient was sent for a chest CT while in the emergency department, results above.       2359 I spoke with Dr. Segovia of the Hospitalist service regarding patient's presentation, findings, and plan of care.      0015 I personally discussed the laboratory and imaging results with the patient and answered all related questions prior to admission    Impression & Plan      Medical Decision Making:  Michelle Polo is an 87 year old female presenting w/ dyspnea     DDx includes COPD exacerbation, pneumonia, CHF exacerbation, PE, ACS, pneumothorax.  EKG interpretation as noted above.   Labs significant for undetectable procalcitonin level,  undetectable troponin level, elevated d-dimer, VBG demonstrates chronic compensated respiratory acidosis consistent with the patient's history of COPD exacerbation.  Imaging sig for no PE, bilat pleural effusion with assoc infiltrates.  Abx given for HAP coverage.  Presentation seems most consistent w/ COPD exacerbation.  Meds given as note above.  Interventions as noted above with improvement in symptoms although unable to wean pt of BiPAP given hypoxia and increased resp effort.  Pt subsequently admitted to hospitalist service for further evaluation and mgmt.  Pt admitted in guarded condition.    Critical care time: 30 minutes      Diagnosis:    ICD-10-CM    1. COPD exacerbation (H) J44.1    2. Lung infiltrate R91.8      Disposition:   The patient is admitted into the care of Dr. Luca Obrien Disclosure:  I, Jocelyn Simon, am serving as a scribe at 4:03 PM on 8/27/2019 to document services personally performed by Yousif Victor MD based on my observations and the provider's statements to me.          EMERGENCY DEPARTMENT       Yousif Victor MD  08/28/19 [id number]

## 2019-08-27 NOTE — ED TRIAGE NOTES
Pt from [facility] there for rehab after a fall. Diagnosed with pneumonia and started on antibiotics 2 days ago, today increased dsypnea. Has not been receiving nebs at nursing home, but SpO2 improved after receiving per EMS.

## 2019-08-27 NOTE — PROGRESS NOTES
Pt placed on bipap 10/5 R12 70%. Gel pad,mepilex applied.     Rt will continue to follow    Jaylyn Roman RRT.  8/27/2019 4:31 PM

## 2019-08-28 PROBLEM — J44.1 COPD EXACERBATION (H): Status: ACTIVE | Noted: 2019-01-01

## 2019-08-28 NOTE — PHARMACY-ADMISSION MEDICATION HISTORY
Admission medication history interview status for the 8/27/2019  admission is complete. See EPIC admission navigator for prior to admission medications     Medication history source reliability:Good    Actions taken by pharmacist (provider contacted, etc): MAR from [facility] TCU, called TCU to clarify medication questions.    Additional medication history information not noted on PTA med list:  - Completed 7 day Levofloxacin 500 mg one time daily course today.    Medication reconciliation/reorder completed by provider prior to medication history? No    Time spent in this activity: 20 minutes    Prior to Admission medications    Medication Sig Last Dose Taking? Auth Provider   acetaminophen (TYLENOL) 500 MG tablet Take 2 tablets (1,000 mg) by mouth 3 times daily  Patient taking differently: Take 1,000 mg by mouth 3 times daily 0800, 1400, 2000 8/27/2019 at 1400 Yes Claudia Moraes PA-C   aspirin 81 MG EC tablet Take 81 mg by mouth daily 8/27/2019 at am Yes Unknown, Entered By History   calcitonin, salmon, (MIACALCIN) 200 UNIT/ACT nasal spray Spray 1 spray into one nostril alternating nostrils daily Alternate nostril each day. 8/27/2019 at am - right nostril Yes Unknown, Entered By History   diltiazem ER COATED BEADS (CARDIZEM CD/CARTIA XT) 300 MG 24 hr capsule Take 300 mg by mouth daily 8/27/2019 at am Yes Unknown, Entered By History   fluticasone-salmeterol (ADVAIR) 250-50 MCG/DOSE inhaler Inhale 1 puff into the lungs 2 times daily 8/27/2019 at am Yes Unknown, Entered By History   hydrALAZINE (APRESOLINE) 10 MG tablet Take 10 mg by mouth 3 times daily 0800, 1400, 2000 8/27/2019 at 1400 Yes Unknown, Entered By History   Lidocaine (LIDOCARE) 4 % Patch Place 1 patch onto the skin every 24 hours To prevent lidocaine toxicity, patient should be patch free for 12 hrs daily.  On at 0800 and off at 2000 8/27/2019 at 0800 new patch on Yes Unknown, Entered By History   losartan (COZAAR) 50 MG  tablet Take 50 mg by mouth 2 times daily 8/27/2019 at am Yes Unknown, Entered By History   naproxen sodium (ANAPROX) 220 MG tablet Take 220 mg by mouth 2 times daily (with meals) 0800, 1700 8/27/2019 at 0800 Yes Unknown, Entered By History   ondansetron (ZOFRAN-ODT) 4 MG ODT tab Take 1 tablet (4 mg) by mouth every 6 hours as needed for nausea or vomiting prn Yes Claudia Moraes PA-C   polyethylene glycol (MIRALAX) powder Take 17 g by mouth daily 8/27/2019 at 1200 Yes Mandy Mejia MD   senna-docusate (SENOKOT-S/PERICOLACE) 8.6-50 MG tablet Take 2 tablets by mouth 2 times daily 8/27/2019 at am Yes Claudia Moraes PA-C   simvastatin (ZOCOR) 20 MG tablet Take 20 mg by mouth At Bedtime 8/26/2019 at hs Yes Unknown, Entered By History   sodium chloride (OCEAN) 0.65 % nasal spray Spray 1 spray into both nostrils every 2 hours as needed for congestion prn Yes Unknown, Entered By History   tamsulosin (FLOMAX) 0.4 MG capsule Take 0.4 mg by mouth daily 8/27/2019 at am Yes Unknown, Entered By History   traMADol (ULTRAM) 50 MG tablet Take 50 mg by mouth every 4 hours as needed for severe pain prn Yes Unknown, Entered By History

## 2019-08-28 NOTE — PROGRESS NOTES
JACOB  D/I: Patient notified by facility that patient has bed hold at [facility]. Facility requested to be notified of patient's discharge plans.  P: SW will continue to follow.

## 2019-08-28 NOTE — PLAN OF CARE
A/O x 3, with forgetfulness, [place] legally blind, tele SR with Pac's, incontinent of B&B, lungs sounds diminished, denies shortness of breath, on oxygen at 3 L NC, weaned off the Bipap this morning at 1100 am, turn and reposition every two hrs, skin intact except bruising, up with assist of 1-2, appetite fair, on iv Zosyn for pneumonia.

## 2019-08-28 NOTE — H&P
[facility]    History and Physical  Hospitalist       Date of Admission:  8/27/2019  Date of Service (when I saw the patient): 08/28/19    Assessment & Plan   Michelle Polo is a 87 year old female who presents with  Shortness of breath    SOB  COPD exacerbation  Pleural effusions  Chronically on oxygen at home. On prn albuterol q4 hours prn, advair 250/50 BID, underlying disease described as severe.  Noted at care facility ([facility]) to have increasing dyspnea, O2 dropping <88% with any activity or speaking. Levofloxacin started 8/20.  Ongoing shortness of breath emergency department placed on BiPAP which they were unable to wean off.  She is afebrile with stable vital signs.  Troponin negative, procalcitonin negative, BNP normal, lactate 0.7. VBG consistent with her chronic respiratory failure.  With question of right lung base infiltrate.  CT chest showed large pleural effusions, no pulmonary embolism as well as a small left renal mass that will need follow-up.  Suspect COPD exacerbation, question underlying pneumonia given recent hospitalization has leukocytosis, pro Joel negative though.  -IMC  -BiPAP support  -solumedrol 60 mg IV q12 hours  -continue vanco/ zosyn (HCAP treatment)  -prn albuterol nebs  -resume advair when able    Recent sacral fracture  T12, L4 compression fracture (probably old)  Sacral fracture After suspected fall on July 31.  Seen by Ortho and nonoperative management recommended.  Recommended PT/OT and follow-up with Ortho.  - continue pain management with naproxen BID, tramadol 50 mg q4 hours prn  - will have Morphine IV 1 mg q4 hours prn for breakthrough pain    Hypertension  Paroxysmal SVT  Pta: losartan 50 mg BID, diltiazem 300 mg daily.   - continue pta meds     H/o NSTEMI  HLD  PAD with h/o revascularization  pta ASA 81 mg daily, simvastatin  - continue pta meds    DVT Prophylaxis: Pneumatic Compression Devices  Code Status: DNR / DNI, advance directive in  chart    Disposition: Expected discharge in 2+ days pending improvement.    Sebastian Segovia MD  [phone] (P)  Text Page     Primary Care Physician   Dr. Brittany Wiggins    Chief Complaint   dyspnea    History is obtained from the medical records, pt on BiPAP and unable    History of Present Illness   Michelle Polo is a 87 year old female who presents with dyspnea.  She has a past medical history remarkable for reportedly advanced COPD, hypertension,  hyperlipidemia, supraventricular tachycardia, hypertension, and history of an STEMI in 2014 during an acute illness.  She was recently hospitalized here in early August 2019 after having a fall and was found to have sacral fracture after fall.  Nonoperative management was planned.  She was at the [facility] facility and recently had some shortness of breath and was started on levofloxacin on August 20.  Per notes from the [facility] facility she had increasing dyspnea and shortness of breath on the day of presentation prompting transfer to the emergency department.  In the emergency department she was assessed and for them denied chest pain.  At the time of my visit she was on BiPAP and could not further obtain history.    The emergency department blood pressure was 128/40 systolic.  O2 sats are 97% on BiPAP.  She is afebrile.  Labs are remarkable for a white count of 11.7.  Troponin, procalcitonin, and BNP were normal.  Lactate was 0.7.  Basic metabolic panel was remarkable for bicarb 36.  VBG showed pH 7.3 7/64/60/37.  CT pulmonary angiogram was without pulmonary embolism but showed large pleural effusions.      Past Medical History    I have reviewed this patient's medical history and updated it with pertinent information if needed.   Past Medical History:   Diagnosis Date     Anemia      Bilateral leg edema      COPD (chronic obstructive pulmonary disease) (H)      Dyslipidemia      Hypercholesteremia      Hypertension      Non-STEMI (non-ST elevated myocardial infarction)  (H)     2-2014     Oxygen dependent      PVD (peripheral vascular disease) (H)      SVT (supraventricular tachycardia) (H)      UTI (lower urinary tract infection)     2-2014       Past Surgical History   I have reviewed this patient's surgical history and updated it with pertinent information if needed.  Past Surgical History:   Procedure Laterality Date     COLONOSCOPY N/A 2/11/2015    Procedure: COMBINED COLONOSCOPY, SINGLE OR MULTIPLE BIOPSY/POLYPECTOMY BY BIOPSY;  Surgeon: Reynold Maldonado MD;  Location:  GI     GENITOURINARY SURGERY       OTHER SURGICAL HISTORY      iliac endarterectomy and bypass     VITRECTOMY PARSPLANA WITH 25 GAUGE SYSTEM Left 12/9/2015    Procedure: VITRECTOMY PARSPLANA WITH 25 GAUGE SYSTEM;  Surgeon: David Santana MD;  Location:  EC     VITRECTOMY PARSPLANA WITH 25 GAUGE SYSTEM Right 2/11/2016    Procedure: VITRECTOMY PARSPLANA WITH 25 GAUGE SYSTEM;  Surgeon: David Santana MD;  Location:  EC       Prior to Admission Medications   Prior to Admission Medications   Prescriptions Last Dose Informant Patient Reported? Taking?   Lidocaine (LIDOCARE) 4 % Patch 8/27/2019 at 0800 new patch on Nursing Home Yes Yes   Sig: Place 1 patch onto the skin every 24 hours To prevent lidocaine toxicity, patient should be patch free for 12 hrs daily.  On at 0800 and off at 2000   acetaminophen (TYLENOL) 500 MG tablet 8/27/2019 at 1400 Nursing Home No Yes   Sig: Take 2 tablets (1,000 mg) by mouth 3 times daily   Patient taking differently: Take 1,000 mg by mouth 3 times daily 0800, 1400, 2000   aspirin 81 MG EC tablet 8/27/2019 at am Nursing Home Yes Yes   Sig: Take 81 mg by mouth daily   calcitonin, salmon, (MIACALCIN) 200 UNIT/ACT nasal spray 8/27/2019 at am - right nostril Nursing Home Yes Yes   Sig: Spray 1 spray into one nostril alternating nostrils daily Alternate nostril each day.   diltiazem ER COATED BEADS (CARDIZEM CD/CARTIA XT) 300 MG 24 hr capsule 8/27/2019 at am Nursing Home  Yes Yes   Sig: Take 300 mg by mouth daily   fluticasone-salmeterol (ADVAIR) 250-50 MCG/DOSE inhaler 8/27/2019 at am Nursing Home Yes Yes   Sig: Inhale 1 puff into the lungs 2 times daily   hydrALAZINE (APRESOLINE) 10 MG tablet 8/27/2019 at 1400 Nursing Home Yes Yes   Sig: Take 10 mg by mouth 3 times daily 0800, 1400, 2000   losartan (COZAAR) 50 MG tablet 8/27/2019 at am Nursing Home Yes Yes   Sig: Take 50 mg by mouth 2 times daily   naproxen sodium (ANAPROX) 220 MG tablet 8/27/2019 at 0800 Nursing Home Yes Yes   Sig: Take 220 mg by mouth 2 times daily (with meals) 0800, 1700   ondansetron (ZOFRAN-ODT) 4 MG ODT tab prn Nursing Home No Yes   Sig: Take 1 tablet (4 mg) by mouth every 6 hours as needed for nausea or vomiting   polyethylene glycol (MIRALAX) powder 8/27/2019 at 1200 Nursing Home No Yes   Sig: Take 17 g by mouth daily   senna-docusate (SENOKOT-S/PERICOLACE) 8.6-50 MG tablet 8/27/2019 at am Nursing Home No Yes   Sig: Take 2 tablets by mouth 2 times daily   simvastatin (ZOCOR) 20 MG tablet 8/26/2019 at hs Nursing Home Yes Yes   Sig: Take 20 mg by mouth At Bedtime   sodium chloride (OCEAN) 0.65 % nasal spray prn Nursing Home Yes Yes   Sig: Spray 1 spray into both nostrils every 2 hours as needed for congestion   tamsulosin (FLOMAX) 0.4 MG capsule 8/27/2019 at am Nursing Home Yes Yes   Sig: Take 0.4 mg by mouth daily   traMADol (ULTRAM) 50 MG tablet prn Nursing Home Yes Yes   Sig: Take 50 mg by mouth every 4 hours as needed for severe pain      Facility-Administered Medications: None     Allergies   Allergies   Allergen Reactions     Albuterol Palpitations       Social History   I have reviewed this patient's social history and updated it with pertinent information if needed. Michelle Polo  reports that she has quit smoking. Her smoking use included cigarettes. She has a 60.00 pack-year smoking history. She does not have any smokeless tobacco history on file. She reports that she does not drink  alcohol.    Family History   I have reviewed this patient's family history and updated it with pertinent information if needed.   Family History   Problem Relation Age of Onset     Bladder Cancer Mother      Pancreatic Cancer Father        Review of Systems   The 10 point Review of Systems is unable to be obtained 2/2 pt situation    Physical Exam   Temp: 97.9  F (36.6  C) Temp src: Oral BP: 138/53 Pulse: 81 Heart Rate: 81 Resp: 30 SpO2: 94 % O2 Device: BiPAP/CPAP Oxygen Delivery: 2.5 LPM  Vital Signs with Ranges  107 lbs 0 oz    Constitutional: sleeping, on bipap  Eyes: unable  HEENT: unable  Respiratory: lungs appear clear  Cardiovascular: RRR. No edema  GI: soft, nontender  Lymph/Hematologic: no cervical LAD  Genitourinary: deferred  Skin: no rashes or lesions grossly  Musculoskeletal: no deformities or arthritis  Neurologic: unable to fully assess, moves extremities  Psychiatric: unable to assess    Data   Data reviewed today:  I personally reviewed the chest CT image(s) showing lg pleural effusions.  Recent Labs   Lab 08/27/19  1603   WBC 11.7*   HGB 11.0*   MCV 97         POTASSIUM 4.7   CHLORIDE 102   CO2 36*   BUN 37*   CR 0.69   ANIONGAP 1*   MICHELLE 8.8   GLC 85   ALBUMIN 2.9*   PROTTOTAL 6.2*   BILITOTAL 0.2   ALKPHOS 142   ALT 27   AST 23   TROPI <0.015       Recent Results (from the past 24 hour(s))   XR Chest Port 1 View    Narrative    CHEST ONE VIEW PORTABLE   8/27/2019 5:08 PM     HISTORY: Respiratory distress    COMPARISON: 7/16/2019      Impression    IMPRESSION:  1. Increase in small pleural effusions.  2. Mild cardiomegaly, diffuse increased interstitial markings.  3. Mild focal infiltrate or atelectasis right lung base is new.    DEVIKA GREENE MD   CT Chest Pulmonary Embolism w Contrast    Narrative    CT CHEST PULMONARY EMBOLISM WITH CONTRAST8/27/2019 8:08 PM    HISTORY: Dyspnea, pelvic fracture, elevated D-dimer.    TECHNIQUE: Axial images from thoracic inlet to diaphragm. 54  mL  Isovue-370. Radiation dose for this scan was reduced using automated  exposure control, adjustment of the mA and/or kV according to patient  size, or iterative reconstruction technique.    COMPARISON: 7/5/2014 CT chest    FINDINGS:   CHEST: No evidence for acute pulmonary embolus. Extensive  atherosclerotic vascular calcification in the chest and upper abdomen.  No evidence for thoracic aortic dissection. There is coronary artery  calcification. Moderately large bilateral pleural effusions.    Multiple linear bands consistent with discoid atelectasis upper, mid  and lower lobes. 1.2 cm groundglass nodule left lower lobe series 6  image 71 new since prior exam.    There is focal lobulation along the lateral upper left kidney series 5  image 146. Further evaluation recommended to rule out a 3 cm left  renal mass versus lobulation.      Impression    IMPRESSION:  1. Moderately large bilateral pleural effusions.  2. No evidence for acute pulmonary embolus.  3. 1.2 cm left lower lobe groundglass nodule. Nonspecific, may be  focal infection or inflammation. Malignancy such as adenocarcinoma  could have a similar appearance. Three-month follow-up and correlation  with any more recent chest CTs for stability.  4. Further evaluation to rule out 3 cm upper left renal mass versus  lobulation.    DEVIKA GREENE MD

## 2019-08-28 NOTE — ED NOTES
"[facility]  ED Nurse Handoff Report    ED Chief complaint: Shortness of Breath      ED Diagnosis:   Final diagnoses:   None       Code Status: Confirm with hospitalist    Allergies:   Allergies   Allergen Reactions     Albuterol Palpitations       Activity level - Baseline/Home:  Stand with Assist  Activity Level - Current:   Stand with Assist    Patient's Preferred language: English   Needed?: No    Isolation: No  Infection: Not Applicable  Bariatric?: No    Vital Signs:   Vitals:    08/27/19 1800 08/27/19 1830 08/27/19 1900 08/27/19 1930   BP: (!) 143/58 (!) 143/57 (!) 142/53 (!) 148/50   Pulse: 85 93 83 84   Resp: 15 18 16 17   Temp:       TempSrc:       SpO2: 98% 98% 98% 97%   Weight:       Height:           Cardiac Rhythm: ,        Pain level: 0-10 Pain Scale: 4    Is this patient confused?: No   Does this patient have a guardian?  No         If yes, is there guardianship documents in the Epic \"Code/ACP\" activity?  No         Guardian Notified?  N/A  [place] - Suicide Severity Rating Scale Completed?  No, secondary to n/a  If yes, what color did the patient score?  N/A    Patient Report: Initial Complaint: Shortness of breath  Focused Assessment: Pt noted to have increased respiratory rate and work of breathing. LS crackles and expiratory wheezes throughout. Pt reports feeling unwell for several days. EMS reports nursing home stated pt was diagnosed with pneumonia and started on antibiotics 2 days ago. Pt alert and oriented. Anxious d/t respiratory distress. Started on BiPAP and has tolerated well. Currently at 30% FiO2.   Tests Performed: Labs, Chest xray, EKG, Chest CT  Abnormal Results:   Labs Ordered and Resulted from Time of ED Arrival Up to the Time of Departure from the ED   CBC WITH PLATELETS DIFFERENTIAL - Abnormal; Notable for the following components:       Result Value    WBC 11.7 (*)     RBC Count 3.74 (*)     Hemoglobin 11.0 (*)     MCHC 30.5 (*)     RDW 15.6 (*)     " Absolute Neutrophil 9.7 (*)     All other components within normal limits   COMPREHENSIVE METABOLIC PANEL - Abnormal; Notable for the following components:    Carbon Dioxide 36 (*)     Anion Gap 1 (*)     Urea Nitrogen 37 (*)     Albumin 2.9 (*)     Protein Total 6.2 (*)     All other components within normal limits   D DIMER QUANTITATIVE - Abnormal; Notable for the following components:    D Dimer 2.4 (*)     All other components within normal limits   ISTAT  GASES LACTATE FREEMAN POCT - Abnormal; Notable for the following components:    PCO2 Venous 64 (*)     PO2 Venous 60 (*)     Bicarbonate Venous 37 (*)     All other components within normal limits   TROPONIN I   NT PROBNP INPATIENT   MAGNESIUM   PROCALCITONIN   BLOOD GAS ARTERIAL AND OXYHGB   ISTAT CG4 GASES LACTATE FREEMAN NURSING POCT   PULSE OXIMETRY NURSING   VITAL SIGNS   PULSE OXIMETRY NURSING   TELEMETRY MONITORING MED/SURG   ISTAT TROPONIN NURSING POCT     XR Chest Port 1 View   Final Result   IMPRESSION:   1. Increase in small pleural effusions.   2. Mild cardiomegaly, diffuse increased interstitial markings.   3. Mild focal infiltrate or atelectasis right lung base is new.      DEVIKA GREENE MD      CT Chest Pulmonary Embolism w Contrast    (Results Pending)       Treatments provided: Monitoring, medication    Family Comments: SonBrown visiting earlier, should be available by phone    OBS brochure/video discussed/provided to patient/family: No              Name of person given brochure if not patient: n/a              Relationship to patient: n/a    ED Medications:   Medications   No lozenges or gum should be given while patient on BIPAP/AVAPS/AVAPS AE (has no administration in time range)   hypromellose-dextran (ARTIFICAL TEARS) 0.1-0.3 % ophthalmic solution 1 drop (has no administration in time range)   Patient may continue current oral medications (has no administration in time range)   ipratropium - albuterol 0.5 mg/2.5 mg/3 mL (DUONEB) neb solution  3 mL (3 mLs Nebulization Given 8/27/19 1639)   methylPREDNISolone sodium succinate (solu-MEDROL) injection 81.25 mg (81.25 mg Intravenous Given 8/27/19 1619)   magnesium sulfate 2 g in water intermittent infusion (0 g Intravenous Stopped 8/27/19 1659)   Saline (81 mLs As instructed Given 8/27/19 1955)   iopamidol (ISOVUE-370) solution 54 mL (54 mLs Intravenous Given 8/27/19 1956)       Drips infusing?:  No    For the majority of the shift this patient was Green.   Interventions performed were n/a.    Severe Sepsis OR Septic Shock Diagnosis Present: No    To be done/followed up on inpatient unit:  Continue plan of care    ED NURSE PHONE NUMBER: [phone]

## 2019-08-28 NOTE — PROGRESS NOTES
Michelle Polo is a 87 year old female admitted on 8/27/2019 with shortness of breath.  She has been admitted by my colleague Dr. Segovia earlier this morning.  Please refer to H&P for details.  Patient appears quite drowsy and barely responding to noxious stimuli.  Had received 4 mg of IV morphine and prior to admission on oral tramadol.  Continues to require BiPAP.    On exam bilateral decreased breath sounds, faint wheezing bilaterally.  No crackles.  Lab studies and imaging results reviewed.    Acute on chronic hypoxic hypercapnic respiratory failure requiring noninvasive mechanical ventilation from obstructive airway disease and pleural effusion.  Acute exacerbation of obstructive airway disease.  Bilateral moderate pleural effusion.  Severe COPD on home oxygen  Patient resident of [facility], on home oxygen 2 L.Started on levofloxacin 8/20 for presumptive pneumonia.  Noted to have increased shortness of breath, oxygen saturation less than 88%.  Placed on BiPAP in the ED and unable to wean off BiPAP.  Admitted under IMC status and barely arousable to noxious stimuli.  Continue IMC status.  Continue BiPAP, will attempt weaning off once much more awake.  We will repeat blood gas for eval of PCO2.  Troponin negative, procalcitonin negative, BNP normal, lactate 0.7.   CXR right lung base infiltrate.    CT chest showed large pleural effusions, no pulmonary embolism as well as a small left renal mass that will need follow-up.    Leukocytosis likely due to steroid therapy.  We will discontinue IV vancomycin.  Continue on IV Zosyn for possible aspiration, questionable pneumonia.  Speech therapy eval a.m.  Wean off supplemental nasal oxygen as able to.  Continue Solu-Medrol IV 60 mg every 12 hours.  PRN albuterol nebulizer therapy.  Continue breoellpita   Cardiogram for evaluation of heart function.  Telemetry monitoring overnight.     Recent sacral fracture  T12, L4 compression fracture (probably  old)  Sacral fracture After suspected fall on July 31.  Seen by Ortho and nonoperative management recommended.  Recommended PT/OT and follow-up with Ortho.  Will discontinue IV morphine and tramadol as patient quite drowsy and barely  able to respond to noxious stimuli.  Pain management with Tylenol as needed, if continues to have pain will consider Voltaren gel topical application.    Continue PTA Naprosyn to 50 mg oral twice daily.     Hypertension  Paroxysmal SVT  Continue PTA losartan 50 mg BID, diltiazem 300 mg daily.     H/o NSTEMI  HLD  PAD with h/o revascularization  Continue PTA ASA 81 mg daily, simvastatin    Total time greater than 25 minutes, discussed with patient's bedside RN.

## 2019-08-28 NOTE — PLAN OF CARE
Pt sleepy but awakes easily. Oriented but forgetful. Tele: SR w/PACs. VSS on BiPap (chronic 2L O2 baseline). NPO. Lung sounds diminished with occasional expiratory wheezes and crackles. Bowel sounds WNL. Incontinent of bladder. Up with 1-2.

## 2019-08-28 NOTE — PHARMACY-VANCOMYCIN DOSING SERVICE
Pharmacy Vancomycin Initial Note  Date of Service 2019  Patient's  10/8/1931  87 year old, female    Indication: Healthcare-Associated Pneumonia    Current estimated CrCl = Estimated Creatinine Clearance: 42.4 mL/min (based on SCr of 0.69 mg/dL).    Creatinine for last 3 days  2019:  4:03 PM Creatinine 0.69 mg/dL    Recent Vancomycin Level(s) for last 3 days  No results found for requested labs within last 72 hours.      Vancomycin IV Administrations (past 72 hours)                   vancomycin (VANCOCIN) 1000 mg in dextrose 5% 200 mL PREMIX (mg) 1,000 mg New Bag 19 2341                Nephrotoxins and other renal medications (From now, onward)    Start     Dose/Rate Route Frequency Ordered Stop    19 2200  vancomycin (VANCOCIN) 1000 mg in dextrose 5% 200 mL PREMIX      1,000 mg  200 mL/hr over 1 Hours Intravenous EVERY 24 HOURS 19 0247      19 0900  naproxen sodium (ANAPROX) tablet 220 mg      220 mg Oral 2 TIMES DAILY WITH MEALS 19 0243      19 0245  piperacillin-tazobactam (ZOSYN) 3.375 g vial to attach to  mL bag     Note to Pharmacy:  Pharmacy can adjust dose based on renal function.    3.375 g  over 30 Minutes Intravenous EVERY 6 HOURS 19 0243            Contrast Orders - past 72 hours (72h ago, onward)    Start     Dose/Rate Route Frequency Ordered Stop    19 194  iopamidol (ISOVUE-370) solution 54 mL      54 mL Intravenous ONCE 19                Plan:  1.  Start vancomycin  1000 mg IV q24h.   2.  Goal Trough Level: 15-20 mg/L   3.  Pharmacy will check trough levels as appropriate in 1-3 Days.    4. Serum creatinine levels will be ordered daily for the first week of therapy and at least twice weekly for subsequent weeks.    5. [place] method utilized to dose vancomycin therapy: Method 1    Cristi Carroll [facility]

## 2019-08-28 NOTE — PROGRESS NOTES
Pt transported to CT on Bipap. Spo2 mid 90s. No problems.  Rt will continue to monitor.    8/27/2019 8:11 PM  Jaylyn Roman RT

## 2019-08-29 NOTE — PROGRESS NOTES
08/29/19 1456   Quick Adds   Type of Visit Initial PT Evaluation   Living Environment   Lives With alone   Living Arrangements apartment  (came from TCU)   Living Environment Comment Pt was admitted from TCU   Self-Care   Usual Activity Tolerance moderate   Current Activity Tolerance poor   Regular Exercise   (getting PT at TCU)   Equipment Currently Used at Home walker, rolling   Activity/Exercise/Self-Care Comment At TCU pt had assist for dressing, bathing, gait with walker. Prior to TCU, independent   Functional Level Prior   Ambulation 3-->assistive equipment and person   Transferring 3-->assistive equipment and person   Toileting 3-->assistive equipment and person   Bathing 2-->assistive person   Communication 0-->understands/communicates without difficulty   Swallowing 0-->swallows foods/liquids without difficulty   Cognition 0 - no cognition issues reported   Fall history within last six months yes   Number of times patient has fallen within last six months 1   Which of the above functional risks had a recent onset or change? ambulation;transferring;fall history   Prior Functional Level Comment Pt on Bipap and then switched to 3L O2   General Information   Onset of Illness/Injury or Date of Surgery - Date 08/27/19   Referring Physician Dr. Webb   Patient/Family Goals Statement Pt is agreeable to returning to TCU   Pertinent History of Current Problem (include personal factors and/or comorbidities that impact the POC) Pt is an 87 year old female admitted with COPD exacerbation. Pt admitted from TCU after a fall with sustained sacral fx and compression fx   Precautions/Limitations fall precautions;oxygen therapy device and L/min   Weight-Bearing Status - LLE full weight-bearing   Weight-Bearing Status - RLE full weight-bearing   Cognitive Status Examination   Orientation person;place   Level of Consciousness alert   Follows Commands and Answers Questions 100% of the time   Personal Safety and Judgment  "intact   Memory intact   Pain Assessment   Patient Currently in Pain No   Integumentary/Edema   Integumentary/Edema Comments redness on bilat heels. Pillow positioned to elevate   Posture    Posture Comments forward head and shoulders   Range of Motion (ROM)   ROM Comment WFL extremities   Strength   Strength Comments generalized weakness but demonstrated antigravity strength   Bed Mobility   Bed Mobility Comments supine to sit: using bedrail and CGA, sit to supine:modA   Transfer Skills   Transfer Comments Sit to stand: Anny   Gait   Gait Comments Pt sidestepped along bed iwht Anny using 3L O2. Standing statically with support and Anny using 3L O2 . Sats dropped after gait and standing to 85% and then recovered to 95%   Balance   Balance Comments needs support for static standing Anny   Coordination   Coordination no deficits were identified   General Therapy Interventions   Planned Therapy Interventions balance training;gait training;strengthening;transfer training;progressive activity/exercise   Clinical Impression   Criteria for Skilled Therapeutic Intervention yes, treatment indicated   PT Diagnosis Decreased pulmonary endurance for activity   Influenced by the following impairments [place], need for supplemental O2, generalized weakness, pain in saacrum and back,    Functional limitations due to impairments increased assist for functional mobility, decreased endurance for activity, O2 desaturation with activity   Clinical Presentation Evolving/Changing   Clinical Presentation Rationale clinical judgement   Clinical Decision Making (Complexity) Moderate complexity   Therapy Frequency Daily   Predicted Duration of Therapy Intervention (days/wks) 2-3 days   Anticipated Discharge Disposition Transitional Care Facility   Risk & Benefits of therapy have been explained Yes   Patient, Family & other staff in agreement with plan of care Yes   [facility] AM-PAC TM \"6 Clicks\"   2016, Trustees of [facility], " "under license to [employer].  All rights reserved.   6 Clicks Short Forms Basic Mobility Inpatient Short Form   [facility] AM-PAC  \"6 Clicks\" V.2 Basic Mobility Inpatient Short Form   1. Turning from your back to your side while in a flat bed without using bedrails? 3 - A Little   2. Moving from lying on your back to sitting on the side of a flat bed without using bedrails? 3 - A Little   3. Moving to and from a bed to a chair (including a wheelchair)? 3 - A Little   4. Standing up from a chair using your arms (e.g., wheelchair, or bedside chair)? 3 - A Little   5. To walk in hospital room? 2 - A Lot   6. Climbing 3-5 steps with a railing? 2 - A Lot   Basic Mobility Raw Score (Score out of 24.Lower scores equate to lower levels of function) 16   Total Evaluation Time   Total Evaluation Time (Minutes) 15     "

## 2019-08-29 NOTE — PROGRESS NOTES
Called to assess the patient regarding her having a hard time getting phlegm up. Repositioned the patient and offered suctioning which patient declined. Suggested maybe we use a Yankauer if she continues to have issues.

## 2019-08-29 NOTE — PLAN OF CARE
Pt A&O. VSS on 3L O2 throughout most of shift. Pt desatting and c/o of shortness of breath along with tachypnea, placed back on BiPap this AM. Pt much more comfortable. Forgetful. Per report legally blind. Tele: SR with PACs. LS diminished/coarse. External catheter in place; low urine output in cannister, CNA reported large urine incontinence in brief at beginning of shift. Continue to monitor

## 2019-08-29 NOTE — PLAN OF CARE
PT: orders received. Chart reviewed and treatment initiated. Pt is a 87 year old female admitted for COPD exacerbation. Pt as admitted from TCU after a fall sustaining a sacral fx.   Discharge Planner PT   Patient plan for discharge: TCU  Current status: Pt is using 3L O2 and keeping sats in upper 90's at rest. Pt was on bipap prior to therapy. Pt was brayden to sit EOB with assist and stand and take a couple of steps along bed. Pt stood with support for a few minutes and O2 sats dropped t 85% but recovered when returned to supine.   Barriers to return to prior living situation:lives alone  Recommendations for discharge: TCU  Rationale for recommendations: Pt is below baseline prior to fall and still has needs for skilled rehab  due to COPD and sacral fx. Pt is not able to function at home alone.       Entered by: Mechelle Cunningham 08/29/2019 3:17 PM

## 2019-08-29 NOTE — PROGRESS NOTES
[facility]  Hospitalist Progress Note   08/29/2019          Assessment and Plan:       Michelle Polo is a 87 year old female admitted on 8/27/2019 with shortness of breath.      Acute on chronic hypoxic hypercapnic respiratory failure requiring noninvasive mechanical ventilation from obstructive airway disease and pleural effusion.  Acute exacerbation of obstructive airway disease.  Bilateral moderate pleural effusion.  Severe COPD on home oxygen  Mild to moderate pulmonary hypertension  Leukocytosis likely due to steroid therapy.  Patient resident of [facility], on home oxygen 2 L.Started on levofloxacin 8/20 for presumptive pneumonia.  Noted to have increased shortness of breath, oxygen saturation less than 88%.    Started on BiPAP in the ED and [place] [facility] has been on intermittent BiPAP.  Troponin negative, procalcitonin negative, BNP normal, lactate 0.7.   pH 7.35, PCO2 62.  CXR right lung base infiltrate.    CT chest showed large pleural effusions, no pulmonary embolism as well as a small left renal mass that will need follow-up.    Echocardiogram with estimated EF at 75 to 80%, hyperdynamic left ventricular function, right ventricular systolic pressure elevated consistent with mild to moderate pulmonary hypertension.  Was on IV Zosyn [8/27], continue IV Zosyn [8/27] for possible aspiration, questionable pneumonia.  Will switch to oral antibiotics pending clinical improvement  Continue IV Solu-Medrol 60 mg every 12 hours, de-escalate likely to once a day tomorrow depending on symptom improvement.  Continue scheduled DuoNeb 4 times a day, PRN albuterol nebulizer.  Continue PTA Breo Ellipta.  Wean off BiPAP as able to to supplemental nasal oxygen.  Incentive spirometer when able to participate.  Continue Telemetry monitoring.  Avoid narcotics and benzodiazepines.    Recent sacral fracture  T12, L4 compression fracture (probably old)  Admitted 8/12/2019 to 8/14/2019 for back pain.  Sacral fracture after suspected fall on July 31st.  Seen by Ortho and nonoperative management recommended.  Discharged to TCU for rehabilitation.  Recommended PT/OT and follow-up with Ortho.  Discontinued PTA oral tramadol as patient was drowsy.  Pain management with Tylenol as needed, if continues to have pain will consider Voltaren gel topical application.    We will switch PTA naproxen as needed as needed.  Follow-up with Dr. Hilton to recheck spine per schedule as outpatient.     Hypertension  Paroxysmal SVT  Continue PTA losartan 50 mg BID, diltiazem 300 mg daily.      H/o NSTEMI  HLD  PAD with h/o revascularization  Continue PTA ASA 81 mg daily, simvastatin     Orders Placed This Encounter      Low Saturated Fat Na <2400 mg      DVT Prophylaxis: SCDs, heparin.  Code Status: DNR/DNI  Disposition: Expected discharge in 2 days pending clinical improvement.    Discussed with patient, bedside RN    Emerita Webb MD        Interval History:      Lying in bed, continues to have shortness of breath on minimal exertion in bed, on 3 L supplemental nasal oxygen with intermittent BiPAP.  Tolerating sips of clear liquids in between the BiPAP.  Drowsy but able to answer simple commands.  Denies any chest pain or shortness of breath.  No nausea or vomiting.  Has not ambulated out of bed yet.  Afebrile since admission.         Physical Exam:        Physical Exam   Temp:  [97.2  F (36.2  C)-98.3  F (36.8  C)] 97.2  F (36.2  C)  Pulse:  [80-97] 82  Heart Rate:  [79-94] 82  Resp:  [9-38] 19  BP: ()/(31-62) 138/51  FiO2 (%):  [30 %] 30 %  SpO2:  [90 %-96 %] 94 %    Intake/Output Summary (Last 24 hours) at 8/29/2019 1041  Last data filed at 8/29/2019 0600  Gross per 24 hour   Intake 420 ml   Output 100 ml   Net 320 ml       Admission Weight: 48.5 kg (107 lb)  Current Weight: 46.8 kg (103 lb 2.8 oz)    PHYSICAL EXAM  GENERAL: Patient is in no distress. Alert and oriented.  HEART: Regular rate and rhythm. S1S2.  Systolic  murmur right sternal border.  LUNGS: Little decreased breath sounds, faint wheezing, no crackles.  ABDOMEN: Soft, no abdominal tenderness, bowel sounds heard   NEURO: Moving all extremities.  EXTREMITIES: No pedal edema   SKIN: Warm, dry. No rash or bruising.  PSYCHIATRY Cooperative       Medications:          aspirin  81 mg Oral Daily     diltiazem ER  300 mg Oral Daily     fluticasone-vilanterol  1 puff Inhalation Daily     hydrALAZINE  10 mg Oral TID     losartan  50 mg Oral BID     methylPREDNISolone  62.5 mg Intravenous Q12H     naproxen  250 mg Oral BID w/meals     piperacillin-tazobactam  3.375 g Intravenous Q6H     simvastatin  20 mg Oral At Bedtime     sodium chloride (PF)  3 mL Intracatheter Q8H     tamsulosin  0.4 mg Oral Daily     acetaminophen, acetaminophen, hypromellose-dextran, lidocaine 4%, lidocaine (buffered or not buffered), magnesium hydroxide, melatonin, naloxone, nitroGLYcerin, - MEDICATION INSTRUCTIONS -, ondansetron **OR** ondansetron, - MEDICATION INSTRUCTIONS -, senna-docusate **OR** senna-docusate, sodium chloride (PF)         Data:      All new lab and imaging data was reviewed.

## 2019-08-29 NOTE — PROGRESS NOTES
JACOB  D/I: JACOB received vm from Natalie at [facility] requesting an update on patient and to find out if patient is using her own BIPAP or the hospital's BIPAP.  JACOB confirmed with medical team that patient is using the hospital BIPAP. JACOB called and provided facility an update with patient's medical status.  P: JACOB will continue to follow.

## 2019-08-30 NOTE — PROGRESS NOTES
MD Notification    Notified Person: MD    Notified Person Name: Dr. Griffith    Notification Date/Time: 8/30/19  0000    Notification Interaction:  Split Web    Purpose of Notification: Severe Abd Pain (RLQ)    Orders Received: Dr. Griffith will come up to see the pt.    Comments:

## 2019-08-30 NOTE — PROGRESS NOTES
RRT called. Pt transferred to CT with BIPAP 12/5 fio2 40%. Spent approximately 90 minutes with pt during RRT.

## 2019-08-30 NOTE — PROGRESS NOTES
Paged for sudden onset severe right lower quadrant tenderness tonight; on arrival she is in great pain. Review of imaging from 2014 shows prior cecal colitis. Plan for IV Dilaudid administration and will order CT abdomen and pelvis for further evaluation.

## 2019-08-30 NOTE — OR NURSING
Dr Pride at bedside to review ABG's. Narcan 4 mcg given per . Patient opens eyes but otherwise is quite lethargic.

## 2019-08-30 NOTE — PLAN OF CARE
A/o. AVSS on Bipap. Denies pain. LS dim. A2 @ bedside, tolerates 3-4L O2 NC when awake. Purewick intact. CMS intact. Tolerates diet. Tele: NSR w/PAC. CTM

## 2019-08-30 NOTE — PROGRESS NOTES
CT shows free abdominal air; Surgery contacted emergently. I have called both her son and daughter and updated them. They both state that they have joint power of  for their mother's care, and understand she may need emergent surgery this morning; if in fact she is found to need emergent surgery, they ask that one of them be called by the surgical team to provide informed consent, as Ms. Polo herself seems delirious at present.

## 2019-08-30 NOTE — OP NOTE
Preoperative diagnosis: Perforated bowel  Postoperative diagnosis: Perforated gastroduodenal ulcer.   Operative procedure:   1. Laparoscopic abdominal exploration  2.  Repair of perforated gastroduodenal ulcer.    3.  Joshua patch  4.  Abdominal washout and drain placement.    Surgeon: Ghulam Christopher M.D.   Anesthesia: GETA   EBL: Less than 5mL.   Events:   After induction of general endotracheal anesthesia Michelle Polo abdomen was prepped and draped in the usual sterile fashion. With the direct visualization trocar in the left upper quadrant the abdomen was entered and pneumoperitoneum was established. Two additional trocars were placed along the mid abdomen.  The perforated ulcer was identified in the gastroduodenal area.  This was repaired with multiple interrupted 2-0 Vicryl sutures.  Nasogastric tube was verified in good position.  The abdomen was thoroughly irrigated and aspirated dry.  Joshua patch was fashioned and placed reinforcing our repair.  Evidence of material was applied.  A 19 Greek channel drain brought to the one of the incisions and secured to the skin.  Trochars removed without evidence of bleeding.  Pneumoperitoneum was released and skin approximated with staples. Dressing applied. No immediate complications. Counts reported correct.

## 2019-08-30 NOTE — PROGRESS NOTES
Critical Care Progress Note      08/30/2019    Name: Michelle Polo MRN#: [id number]   Age: 87 year old YOB: 1931     Hsptl Day# 2  ICU DAY #0    MV DAY #0             Problem List:   Active Problems:    COPD exacerbation (H)  gastric perforation s/p repair  Acute hypoxemic respiratory failure requiring intubation and mechanical ventilation           Summary/Hospital Course:     87F lives independently with pmh of copd, pvd, CAD who was admitted 8/27 with a copd exacerbation.  Overnight on 8/29 she developed a gastric-duodenal perforation and was taken to the OR for repair.  Extubation following the OR was attempted but failed due to acute hypercapnia, the patient was re-intubated.      Assessment and plan :     Michelle Polo IS a 87 year old female admitted on 8/27/2019 for acute hypercapnic respiratory failure.   I have personally reviewed the daily labs, imaging studies, cultures and discussed the case with referring physician and consulting physicians.   My assessment and plan by system for this patient is as follows:    Neurology/Psychiatry:   1. Analgesia:  Dilaudid prn  2.  Sedation: propofol     Cardiovascular:   1.H/o htn:  Prn iv hydralazine and labetalol, unable to give po antihypertensives now  2.  H/o HLD: hold po statin for now  3.  H/o CAD: hold po asa for now    Pulmonary/Ventilator Management:   1. Acute hypercapnic respiratory failure, vent-dependent:  Failed extubation following OR overnight due to hypercapnia: 7.23/77.  Suspect copd exacerbation.  Lungs clear on chest ct on admission so feel pna less likely.  Continue steroids and duonebs. She did have small to moderate b/l pleural effusions on admission, however they do not seem sufficiently large to be contributing much to her respiratory failure.  2.  H/o COPD: Had obstructive disease on spirometry in 2014 done at [facility].  FVC 0.96, FEV-1 0.65 (67%).    GI and Nutrition :   1. NPO, advance diet if/when cleared by surgery  2. PUD:  " Bid PPI  3. S/p gastroduodenal perf:  Appreciate surgery support    Renal/Fluids/Electrolytes:   1. Creat ok 0.82    Infectious Disease:   1. Empirically on zosyn for abdominal coverage.    Endocrine:   1.  Stress induced hyperglycemia:  Ok in 130s for now.  Insulin as needed.    Hematology/Oncology:   1. Leukocytosis to 17:  In setting of acute intra-abdominal perf.  2.  hgb 12 stable  3.  pltlts 444 ok.    IV/Access:   1. Venous access - peripheral    ICU Prophylaxis:   1. DVT: Hep Subq/ mechanical  2. VAP: HOB 30 degrees, chlorhexidine rinse  3. Stress Ulcer: PPI  4. Restraints: Nonviolent soft two point restraints required and necessary for patient safety and continued cares and good effect as patient continues to pull at necessary lines, tubes despite education and distraction. Will readdress daily.   5. Wound care - per unit routine   6. Feeding - npo   7. Family Update:see below  8. Disposition - icu    Family meeting 8/30/2019:  The patient's condition precluded her from making her own medical decisions so I met with the patient's son and daugther for medical decision making purposes.  Ms. Polo had a gastroduodenal perforation overnight in the setting of requiring bipap for a copd exacerbation.  Following surgery she failed extubation for hypercapnia requiring re-intubation.  I discussed with the patient's family that the use of bipap in the setting of gastric perf is non-trivial, and that this combined with her failing extubation overnight suggest that she may be in for a prolonged course of mechanical ventilation.  We also reviewed her DNR/DNI code status, as well as her advanced directive which under the categories of \"mechanical ventilation\" and \"artifical nutrition\" she wrote \"do not want\".    We discussed options for continuing care including aggressive measures, and less aggressive options such as compassionate extubation to comfort measures only.    Her son and daughter will discuss this with her " two other sons and make a decision as to how to proceed.  She remains DNR for now.         Interim History:     See above.         Key Medications:       acetylcysteine  2 mL Nebulization Once     aspirin  81 mg Oral Daily     diltiazem ER  300 mg Oral Daily     fluticasone-vilanterol  1 puff Inhalation Daily     heparin  5,000 Units Subcutaneous Q12H     hydrALAZINE  10 mg Oral TID     ipratropium - albuterol 0.5 mg/2.5 mg/3 mL  3 mL Nebulization 4x daily     losartan  50 mg Oral BID     methylPREDNISolone  62.5 mg Intravenous Q12H     pantoprazole (PROTONIX) IV  40 mg Intravenous Daily with breakfast     piperacillin-tazobactam  3.375 g Intravenous Q6H     sodium chloride 0.9 %  60 mL Intravenous Once     simvastatin  20 mg Oral At Bedtime     sodium chloride (PF)  3 mL Intracatheter Q8H     tamsulosin  0.4 mg Oral Daily       diltiazem (CARDIZEM) infusion ADULT       - MEDICATION INSTRUCTIONS -       - MEDICATION INSTRUCTIONS -       propofol (DIPRIVAN) infusion 20 mcg/kg/min (08/30/19 0749)               Physical Examination:   Temp:  [96.7  F (35.9  C)-98.3  F (36.8  C)] 96.8  F (36  C)  Pulse:  [] 71  Heart Rate:  [] 76  Resp:  [12-34] 15  BP: (116-208)/() 154/69  MAP:  [71 mmHg-116 mmHg] 71 mmHg  Arterial Line BP: (126-192)/(45-96) 126/45  FiO2 (%):  [30 %-50 %] 40 %  SpO2:  [88 %-100 %] 98 %    Intake/Output Summary (Last 24 hours) at 8/30/2019 1000  Last data filed at 8/30/2019 0720  Gross per 24 hour   Intake 1850 ml   Output 1573 ml   Net 277 ml     Wt Readings from Last 4 Encounters:   08/28/19 46.8 kg (103 lb 2.8 oz)   08/12/19 48.5 kg (107 lb)   07/16/19 50.8 kg (112 lb)   01/13/16 46.3 kg (102 lb)     Arterial Line BP: (126-192)/(45-96) 126/45  MAP:  [71 mmHg-116 mmHg] 71 mmHg  BP - Mean:  [] 96  Ventilation Mode: CMV/AC  (Continuous Mandatory Ventilation/ Assist Control)  FiO2 (%): 40 %  Rate Set (breaths/minute): 16 breaths/min  Tidal Volume Set (mL): 400 mL  PEEP (cm H2O): 5  cmH2O  Oxygen Concentration (%): 30 %  Resp: 15    Recent Labs   Lab 08/30/19  0550 08/28/19  1001   PH 7.23*  --    PCO2 77*  --    PO2 105  --    HCO3 32*  --    O2PER  --  30% Bipap     GEN: no acute distress, sedated   HEENT: head ncat, sclera anicteric, OP patent, trachea midline   PULM: unlabored synchronous with vent, clear anteriorly    CV/COR: RRR S1S2 no gallop,  No rub, no murmur  ABD: soft, tenderness appropriate to procedure, hypoactive bowel sounds, no mass  EXT:  minimal  Edema,   Warm x4  NEURO: grossly intact, sedated  SKIN: no obvious rash  LINES: clean, dry intact         Data:   All data and imaging reviewed     ROUTINE ICU LABS (Last four results)  CMP  Recent Labs   Lab 08/30/19  0040 08/29/19  0651 08/27/19  1603    142 139   POTASSIUM 4.4 4.4 4.7   CHLORIDE 105 107 102   CO2 33* 32 36*   ANIONGAP 4 3 1*   * 122* 85   BUN 44* 40* 37*   CR 0.82 0.77 0.69   GFRESTIMATED 64 69 78   GFRESTBLACK 74 80 >90   MICHELLE 9.2 8.6 8.8   MAG 2.8*  --  2.3   PROTTOTAL  --   --  6.2*   ALBUMIN  --   --  2.9*   BILITOTAL  --   --  0.2   ALKPHOS  --   --  142   AST  --   --  23   ALT  --   --  27     CBC  Recent Labs   Lab 08/30/19  0040 08/29/19  0651 08/28/19  1001 08/27/19  1603   WBC 17.1* 16.1* 8.4 11.7*   RBC 3.96 3.32*  --  3.74*   HGB 11.9 10.0*  --  11.0*   HCT 37.7 31.7*  --  36.1   MCV 95 96  --  97   MCH 30.1 30.1  --  29.4   MCHC 31.6 31.5  --  30.5*   RDW 15.6* 15.6*  --  15.6*    283  --  303     INRNo lab results found in last 7 days.  Arterial Blood Gas  Recent Labs   Lab 08/30/19  0550 08/28/19  1001   PH 7.23*  --    PCO2 77*  --    PO2 105  --    HCO3 32*  --    O2PER  --  30% Bipap       All cultures:  No results for input(s): CULT in the last 168 hours.  Recent Results (from the past 24 hour(s))   CT Abdomen Pelvis w Contrast   Result Value    Radiologist flags Pneumoperitoneum (AA)    Narrative    CT ABDOMEN PELVIS W CONTRAST  8/30/2019 1:18 AM     HISTORY: Abdominal  distension. Abdominal pain, unspecified,  gastroenteritis or colitis suspected. Abdominal infection (including  peritonitis).    TECHNIQUE: Volumetric acquisition through abdomen and pelvis with IV  contrast. 52 mL Isovue-370. Radiation dose for this scan was reduced  using automated exposure control, adjustment of the mA and/or kV  according to patient size, or iterative reconstruction technique.    COMPARISON: 12/15/2014.    FINDINGS: Moderate pleural effusions, greater on the right.    There is a small amount of free air in the upper abdomen. Mild fat  stranding/inflammation in the right upper quadrant near the duodenum  and gastric antrum with a tiny gas bubble adjacent to the proximal  duodenum. Additional tiny bubbles of gas just anterior to this.  Suspect perforated peptic ulcer as the etiology of the free air.  Minimal ascites.    Mildly distended gallbladder. No calcified gallstones. Liver, spleen,  pancreas and left adrenal gland are negative. Stable 1.9 cm right  adrenal nodule. Bilateral renal cysts and a few small subcentimeter  low-dense lesions which are too small to accurately characterize.  There is a 2.4 cm indeterminate rounded lesion at the upper pole of  the left kidney laterally measuring approximately 50 Hounsfield units.  This could be a hemorrhagic cyst or a solid lesion. Recommend  follow-up MRI.    Uterus is present. Small amount of free fluid. Colonic diverticulosis.  No bowel obstruction. Degenerative changes in the visualized spine.  Moderate compression of T12 is age indeterminate but new since the  prior study.      Impression    IMPRESSION:  1. Pneumoperitoneum.  2. Suspect perforated peptic ulcer as the etiology. See above.  3. 2.4 cm left renal lesion is indeterminate; hemorrhagic cyst versus  solid lesion/renal cell carcinoma. Follow-up outpatient MRI  recommended for further evaluation.  4. Mild gallbladder distention. Colonic diverticulosis.  5. Bilateral pleural effusions,  greater on the right.  6. Age-indeterminate compression of T12.    [Critical Result: Pneumoperitoneum]    Finding was identified on 8/30/2019 1:24 AM.     Neymar Chacon NP, was contacted by me on 8/30/2019 1:34 AM and  verbalized understanding of the critical result.    JESSICA TOLEDO MD     Labs (personally reviewed/interpreted):  See a/p  CT chest (personally reviewed/interpreted):  See a/p    Billing: This patient is critically ill: Yes. Total critical care time today 60 min.

## 2019-08-30 NOTE — ANESTHESIA PREPROCEDURE EVALUATION
Anesthesia Pre-Procedure Evaluation    Patient: Michelle Polo   MRN: [id number] : 10/8/1931          Preoperative Diagnosis: PERFORATED GASTRIC ULCER    Procedure(s):  LAPAROSCOPIC ABDOMINAL EXPLORATION WITH REPAIR OF PERFORATED ULCER, POSSIBLE OPEN    Past Medical History:   Diagnosis Date     Anemia      Bilateral leg edema      COPD (chronic obstructive pulmonary disease) (H)      Dyslipidemia      Hypercholesteremia      Hypertension      Non-STEMI (non-ST elevated myocardial infarction) (H)          Oxygen dependent      PVD (peripheral vascular disease) (H)      SVT (supraventricular tachycardia) (H)      UTI (lower urinary tract infection)          Past Surgical History:   Procedure Laterality Date     COLONOSCOPY N/A 2015    Procedure: COMBINED COLONOSCOPY, SINGLE OR MULTIPLE BIOPSY/POLYPECTOMY BY BIOPSY;  Surgeon: Reynold Maldonado MD;  Location:  GI     GENITOURINARY SURGERY       OTHER SURGICAL HISTORY      iliac endarterectomy and bypass     VITRECTOMY PARSPLANA WITH 25 GAUGE SYSTEM Left 2015    Procedure: VITRECTOMY PARSPLANA WITH 25 GAUGE SYSTEM;  Surgeon: David Santana MD;  Location:  [facility]     VITRECTOMY PARSPLANA WITH 25 GAUGE SYSTEM Right 2016    Procedure: VITRECTOMY PARSPLANA WITH 25 GAUGE SYSTEM;  Surgeon: David Santana MD;  Location:  [facility]       Anesthesia Evaluation     .             ROS/MED HX    ENT/Pulmonary: Comment: Admitted with acute COPD exacerbation on 2019, placed on BiPAP in ED    Moderate bilateral pleural effusions    Intermittent O2 use    (+)tobacco use, Past use moderate COPD, O2 dependent, , . .   (-) sleep apnea   Neurologic:      (-) seizures, CVA and TIA   Cardiovascular: Comment: History of paroxysmal SVT, currently on diltiazem infusion    S/p stent to left lower extremity artery (daughter unsure of which vessel)    (+) Dyslipidemia, hypertension-Peripheral Vascular Disease-- Other, CAD, -past MI,-. : . . . :. dysrhythmias  Other, . pulmonary hypertension, Previous cardiac testing Echodate:8/2019results:Interpretation Summary     The visual ejection fraction is estimated at 75-80%.  Hyperdynamic left ventricular function  Right ventricular systolic pressure is elevated, consistent with mild to  moderate pulmonary hypertension.  The study was technically difficult.  _____________________________________________________________________________  __        Left Ventricle  The left ventricle is normal in size. There is normal left ventricular wall  thickness. Diastolic Doppler findings (E/E' ratio and/or other parameters)  suggest left ventricular filling pressures are increased. Hyperdynamic left  ventricular function. Grade I or early diastolic dysfunction. The visual  ejection fraction is estimated at 75-80%.     Right Ventricle  The right ventricle is normal in size and function.     Atria  The left atrium is moderately dilated. (LA size not indexed to BSA). Right  atrial size is normal. Lipomatous hypertrophy of the interatrial septum is  noted. There is no color Doppler evidence of an atrial shunt.     Mitral Valve  There is moderate to severe mitral annular calcification. The mitral papillary  muscle appears thickened and/or calcified. There is trace mitral  regurgitation.        Tricuspid Valve  There is mild (1+) tricuspid regurgitation. The right ventricular systolic  pressure is approximated at 37.8 mmHg plus the right atrial pressure. Right  ventricular systolic pressure is elevated, consistent with mild to moderate  pulmonary hypertension.     Aortic Valve  The aortic valve is not well visualized. Thickened aortic valve leaflets. No  aortic regurgitation is present. No hemodynamically significant valvular  aortic stenosis.     Pulmonic Valve  There is no pulmonic valvular regurgitation. Normal pulmonic valve velocity.     Vessels  The aortic root is normal size. Normal size ascending aorta. The inferior vena  cava was normal in  size with preserved respiratory variability.     Pericardium  There is no pericardial effusion.        Rhythm  Sinus rhythm was noted.date: results:ECG reviewed date:8/2019 results:NSR with PACs vs. sinus arrythmia date: results:          METS/Exercise Tolerance:     Hematologic:     (+) Anemia, -      Musculoskeletal: Comment: Sacral fracture 2/2 presumed fall 7/2019    Vertebral compression fractures at T12 and L4  (+)  other musculoskeletal-       GI/Hepatic: Comment: Peptic ulcer disease        Renal/Genitourinary:     (+) chronic renal disease, type: CRI,       Endo:      (-) Type I DM and Type II DM   Psychiatric:         Infectious Disease:         Malignancy:         Other:                          Physical Exam  Normal systems: dental    Airway   Mallampati: III  TM distance: >3 FB  Neck ROM: full  Comment: BiPAP in place    Dental     Cardiovascular   Rhythm and rate: irregular      Pulmonary (+) decreased breath sounds               Lab Results   Component Value Date    WBC 17.1 (H) 08/30/2019    HGB 11.9 08/30/2019    HCT 37.7 08/30/2019     08/30/2019     08/30/2019    POTASSIUM 4.4 08/30/2019    CHLORIDE 105 08/30/2019    CO2 33 (H) 08/30/2019    BUN 44 (H) 08/30/2019    CR 0.82 08/30/2019     (H) 08/30/2019    MICHELLE 9.2 08/30/2019    MAG 2.8 (H) 08/30/2019    ALBUMIN 2.9 (L) 08/27/2019    PROTTOTAL 6.2 (L) 08/27/2019    ALT 27 08/27/2019    AST 23 08/27/2019    ALKPHOS 142 08/27/2019    BILITOTAL 0.2 08/27/2019    LIPASE 216 12/15/2014    PTT 26 08/16/2007    INR 0.94 12/15/2014    TSH 1.61 07/16/2019       Preop Vitals  BP Readings from Last 3 Encounters:   08/30/19 (!) 186/86   08/14/19 (!) 154/44   07/16/19 179/67    Pulse Readings from Last 3 Encounters:   08/30/19 98   08/13/19 59   07/16/19 82      Resp Readings from Last 3 Encounters:   08/30/19 18   08/14/19 20   07/16/19 18    SpO2 Readings from Last 3 Encounters:   08/30/19 94%   08/14/19 93%   07/16/19 92%      Temp  "Readings from Last 1 Encounters:   08/30/19 36.4  C (97.6  F) (Axillary)    Ht Readings from Last 1 Encounters:   08/27/19 1.568 m (5' 1.75\")      Wt Readings from Last 1 Encounters:   08/28/19 46.8 kg (103 lb 2.8 oz)    Estimated body mass index is 19.02 kg/m  as calculated from the following:    Height as of this encounter: 1.568 m (5' 1.75\").    Weight as of this encounter: 46.8 kg (103 lb 2.8 oz).       Anesthesia Plan      History & Physical Review  History and physical reviewed and following examination; no interval change.    ASA Status:  4 .    NPO Status:  Waived due to emergency    Plan for General and ETT with Intravenous and Propofol induction. Maintenance will be Inhalation.    PONV prophylaxis:  Ondansetron (or other 5HT-3)  Additional equipment: 2nd IV and Arterial Line I discussed the risk of prolonged intubation, given medical comorbidities and the nature of the procedure, postoperatively with Michelle Carrillo's son, and he expresses understanding and accepts the risk.       I discussed reversal of Michelle's DNR status with her son, Gerardo, and he agreed to full resuscitation during the perioperative period, including CPR, pharmacologic support of circulation, intubation and defibrillation.      Per patient's daughter, no known albuterol allergy (patient has taken it in the past without issue)          Postoperative Care  Postoperative pain management:  Multi-modal analgesia.      Consents  Anesthetic plan, risks, benefits and alternatives discussed with:  Daughter/Son (Consent obtained from Son, Gerardo)..                 Joce Pride MD  "

## 2019-08-30 NOTE — PROGRESS NOTES
Michelle Polo is a 87 year old female admitted on 8/27/2019 with shortness of breath from acute on chronic hypoxic hypercapnic respiratory failure.  Overnight had acute abdomen requiring emergent laparoscopic abdominal exploration for repair of perforated gastric duodenal ulcer.  Intubated and currently in ICU.  Discussed with intensivist team , hospitalist service will sign off at this time.  Please reconsult as once patient medically stable for transfer.

## 2019-08-30 NOTE — CODE/RAPID RESPONSE
"[facility]    House CHARLY RRT Note  8/30/2019   Time Called: 0025    RRT called for: New symptomatic tachycardia 160s    Assessment & Plan     Acute abdominal pain 2/2 suspected peptic ulcer perforation.  Paroxysmal SVT with history of SVT.  - Upon arrival, pt lying in bed, writhing in pain, in fetal position, on Bipap.  Pt's VS noting sinus tachycardia, HR 110s, SBP 200s, RR 30s.  Pt moaning/grunting, shakes head \"yes\" when asked if having pain, holding RLQ.  Pt's abdomen with noted guarding, severe tenderness to minimal palpation, faint bowel sounds noted.  After assisting pt to cart, pt with paroxysmal SVT (regular appearing - in paper chart; not able to capture with EKG), HR up to 180s, while maintaining SBP 200s.  Will judicious administer metoprolol 2.5 mg IV now en route to imaging.  Will avoid rapid reduction of SBP until imaging results noted.      INTERVENTIONS:  - Stat CT A/P with contrast previously ordered by hospitalist  - Stat lactic acid, BMP, Mg, CBC, VBG  - PRN metoprolol for HR > 120, hydralazine for SBP > 180  - Diltiazem infusion to maintain HR < 120 while maintaining SBP > 100 (had not received PO diltiazem today)  - Stat general surgery consult; discussed pt's clinical status and imaging findings with Dr. Christopher, on call general surgery.  Dr. Christopher stated he will review imaging and call unit back with plan of care.  - Will uptitrate dilaudid to 0.3-0.5 mg Q2H PRN IV as pt remains in severe pain    At the end of the RRT pt remains hemodynamically stable, remains in severe pain, surgical plan pending at this time.    Discussed with and defer further cares to nursing and Dr. Griffith, cross covering hospitalist.    Interval History     Michelle Polo is a 87 year old female who was admitted on 8/27/2019 for SOB.    Medical history significant for: Chronic hypoxic respiratory failure, COPD on home O2, moderate pulmonary HTN, HTN, NSTEMI, HLP, PAD    Code Status: DNR/DNI    Allergies "   Allergies   Allergen Reactions     Albuterol Palpitations     Physical Exam   Vital Signs with Ranges:  Temp:  [97.2  F (36.2  C)-98.6  F (37  C)] 98  F (36.7  C)  Pulse:  [77-96] 92  Heart Rate:  [] 100  Resp:  [15-30] 30  BP: (108-151)/(39-69) 117/54  FiO2 (%):  [30 %] 30 %  SpO2:  [93 %-96 %] 93 %  I/O last 3 completed shifts:  In: 280 [P.O.:280]  Out: 850 [Urine:850]    Constitutional: Pt lying in bed, eyes closed, on Bipap, knees bent up, holding abdomen, moaning/grunting  Pulmonary: Tachypneic, clear lung sounds throughout, no obvious crackles or wheezes noted  Cardiovascular: Irregular tachycardic rate and rhythm, normal S1S2, no murmur, rub or gallop noted  GI: Round, distended, tender to minimal palpation, guarding noted, faint bowel sounds noted    Skin/Integumen: Warm, dry  Neuro: Awake, alert  Psych:  Calm  Extremities: Moves all extremities, no mottling or edema noted    Data     IMAGING: (X-ray/CT/MRI)   Recent Results (from the past 24 hour(s))   CT Abdomen Pelvis w Contrast   Result Value    Radiologist flags Pneumoperitoneum (AA)    Narrative    CT ABDOMEN PELVIS W CONTRAST  8/30/2019 1:18 AM     HISTORY: Abdominal distension. Abdominal pain, unspecified,  gastroenteritis or colitis suspected. Abdominal infection (including  peritonitis).    TECHNIQUE: Volumetric acquisition through abdomen and pelvis with IV  contrast. 52 mL Isovue-370. Radiation dose for this scan was reduced  using automated exposure control, adjustment of the mA and/or kV  according to patient size, or iterative reconstruction technique.    COMPARISON: 12/15/2014.    FINDINGS: Moderate pleural effusions, greater on the right.    There is a small amount of free air in the upper abdomen. Mild fat  stranding/inflammation in the right upper quadrant near the duodenum  and gastric antrum with a tiny gas bubble adjacent to the proximal  duodenum. Additional tiny bubbles of gas just anterior to this.  Suspect perforated peptic  ulcer as the etiology of the free air.  Minimal ascites.    Mildly distended gallbladder. No calcified gallstones. Liver, spleen,  pancreas and left adrenal gland are negative. Stable 1.9 cm right  adrenal nodule. Bilateral renal cysts and a few small subcentimeter  low-dense lesions which are too small to accurately characterize.  There is a 2.4 cm indeterminate rounded lesion at the upper pole of  the left kidney laterally measuring approximately 50 Hounsfield units.  This could be a hemorrhagic cyst or a solid lesion. Recommend  follow-up MRI.    Uterus is present. Small amount of free fluid. Colonic diverticulosis.  No bowel obstruction. Degenerative changes in the visualized spine.  Moderate compression of T12 is age indeterminate but new since the  prior study.      Impression    IMPRESSION:  1. Pneumoperitoneum.  2. Suspect perforated peptic ulcer as the etiology. See above.  3. 2.4 cm left renal lesion is indeterminate; hemorrhagic cyst versus  solid lesion/renal cell carcinoma. Follow-up outpatient MRI  recommended for further evaluation.  4. Mild gallbladder distention. Colonic diverticulosis.  5. Bilateral pleural effusions, greater on the right.  6. Age-indeterminate compression of T12.    [Critical Result: Pneumoperitoneum]    Finding was identified on 8/30/2019 1:24 AM.     Neymar Chacon NP, was contacted by me on 8/30/2019 1:34 AM and  verbalized understanding of the critical result.     CBC with Diff:  Recent Labs   Lab Test 08/30/19  0040  12/15/14  1009   WBC 17.1*   < > 19.8*   HGB 11.9   < > 11.8   MCV 95   < > 96      < > 272   INR  --   --  0.94    < > = values in this interval not displayed.          Comprehensive Metabolic Panel:  Recent Labs   Lab 08/30/19  0040  08/27/19  1603      < > 139   POTASSIUM 4.4   < > 4.7   CHLORIDE 105   < > 102   CO2 33*   < > 36*   ANIONGAP 4   < > 1*   *   < > 85   BUN 44*   < > 37*   CR 0.82   < > 0.69   GFRESTIMATED 64   < > 78    GFRESTBLACK 74   < > >90   MICHELLE 9.2   < > 8.8   MAG 2.8*  --  2.3   PROTTOTAL  --   --  6.2*   ALBUMIN  --   --  2.9*   BILITOTAL  --   --  0.2   ALKPHOS  --   --  142   AST  --   --  23   ALT  --   --  27    < > = values in this interval not displayed.     Recent Labs   Lab 08/29/19  0651 08/28/19  1001 08/27/19  1611   PHV 7.35 7.38 7.37   PO2V 52* 63* 60*   PCO2V 62* 61* 64*   HCO3V 35* 36* 37*     Recent Labs   Lab 08/30/19  0040 08/27/19  1611   LACT 0.7 0.7     Time Spent on this Encounter   I spent 90 minutes of critical care time on the unit/floor managing the care of Michelle Polo. Upon evaluation, this patient had a high probability of imminent or life-threatening deterioration due to acute abdomen, which required my direct attention, intervention, and personal management. 100% of my time was spent at the bedside counseling the patient and/or coordinating care regarding services listed in this note.    LISANDRO Cates [facility]

## 2019-08-30 NOTE — ANESTHESIA PROCEDURE NOTES
ARTERIAL LINE PROCEDURE NOTE:   Pre-Procedure  Performed by Joce Pride MD  Location: pre-op      Pre-Anesthestic Checklist: patient identified, IV checked, site marked, risks and benefits discussed, informed consent, monitors and equipment checked and pre-op evaluation    Timeout  Correct Patient: Yes   Correct Procedure: Yes   Correct Site: Yes   Correct Laterality: Yes   Correct Position: Yes   Site Marked: N/A   .   Procedure Documentation  Procedure: arterial line    Supine  Insertion Site:radial, right.Skin infiltrated with mL of 1% lidocaine. Injection technique: Seldinger Technique  .  .  Patient Prep;all elements of maximal sterile barrier technique followed, mask, hat, sterile gown, sterile gloves, draped, hand hygiene, chlorhexidine gluconate and isopropyl alcohol    Assessment/Narrative    Catheter: 20 gauge, 1.75 in/4.5 cm quick cath (integral wire)      Tegaderm dressing used.    Arterial waveform: Yes     Comments:  Site sterilely prepped with Chloraprep.  Lidocaine 1% used for skin topicalization.  Right radial artery palpated.  Arrow catheter advanced into radial artery, with return of bright red blood.  Pulsatile waveform on monitor.  Patient tolerate procedure well.

## 2019-08-30 NOTE — ANESTHESIA CARE TRANSFER NOTE
Patient: Michelle Polo    Procedure(s):  LAPAROSCOPIC ABDOMINAL EXPLORATION WITH REPAIR OF PERFORATED GASTRIC ULCER AND GRAM PATCH    Diagnosis: PERFORATED GASTRIC ULCER  Diagnosis Additional Information: No value filed.    Anesthesia Type:   General, ETT     Note:  Airway :Other (See Comments) (biPAP)  Patient transferred to:PACU  Comments: Transferred to PACU, spontaneous respirations, on BiPap.  All monitors and alarms on and functioning, VSS.  Patient awake, comfortable.  Report to PACU RN.Handoff Report: Identifed the Patient, Identified the Reponsible Provider, Reviewed the pertinent medical history, Discussed the surgical course, Reviewed Intra-OP anesthesia mangement and issues during anesthesia, Set expectations for post-procedure period and Allowed opportunity for questions and acknowledgement of understanding      Vitals: (Last set prior to Anesthesia Care Transfer)    CRNA VITALS  8/30/2019 0452 - 8/30/2019 0522      8/30/2019             Resp Rate (set):  10                Electronically Signed By: LISANDRO Pace CRNA  August 30, 2019  5:22 AM

## 2019-08-30 NOTE — ANESTHESIA POSTPROCEDURE EVALUATION
Patient: Michelle Polo    Procedure(s):  LAPAROSCOPIC ABDOMINAL EXPLORATION WITH REPAIR OF PERFORATED GASTRIC ULCER AND GRAM PATCH    Diagnosis:PERFORATED GASTRIC ULCER  Diagnosis Additional Information: No value filed.    Anesthesia Type:  General, ETT    Note:  Anesthesia Post Evaluation    Patient location during evaluation: ICU  Patient participation: Unable to evaluate secondary to administered sedation  Level of consciousness: obtunded/minimal responses  Pain management: unable to assess  Airway patency: patent (Intubated)  Cardiovascular status: stable  Respiratory status: ventilator  Hydration status: acceptable  PONV: unable to assess       Comments: Initially attempted extubation to BiPAP.  Patient with adequate mentation, stable vital signs, including SpO2 in low-mid 90's on 40% FiO2 with 14/5 mmHg.  ABG was 7.23, PCO2 77, PO2 105.  At request of Dr. Beck, patient reintubated and transported to ICU in stable condition.  Family updated.          Last vitals:  Vitals:    08/30/19 0630 08/30/19 0640 08/30/19 0700   BP: 133/53 139/65    Pulse: 77 87    Resp: 17 22    Temp:      SpO2: 96% 97% 100%         Electronically Signed By: Joce Pride MD  August 30, 2019  7:21 AM

## 2019-08-30 NOTE — PROGRESS NOTES
"General Surgery Progress Note    Admission Date: 8/27/2019  Today's Date: 8/30/2019         Assessment:      Michelle Polo is a 87 year old female POD 1 s/p lap abdominal exploration with repair of perforated gastroduodenal ulcer, chintan patch, abdominal washout and drain placement         Plan:   - Protonix IV bid  - Continue Zosyn for intra-abdominal contamination  - Continue DARRELL drain. Strip each shift, monitor output  - NPO. Currently intubated  - Start heparin later today for DVT ppx. PCDs. Platelets and hemoglobin currently stable  - Appreciate excellent ICU cares, management per intensivist/hospitalist teams          Interval History:   Afebrile overnight, intubated, sedated. No acute events since surgery. Vitals stable.          Physical Exam:   BP (!) 154/69   Pulse 71   Temp 96.8  F (36  C) (Axillary)   Resp 15   Ht 1.568 m (5' 1.75\")   Wt 46.8 kg (103 lb 2.8 oz)   SpO2 98%   BMI 19.02 kg/m    I/O last 3 completed shifts:  In: 1850 [P.O.:100; I.V.:1500]  Out: 1505 [Urine:1250; Drains:250; Blood:5]  General: intubated, sedated, does not open eyes to voice. Winces slightly to touch  Abdomen: distended but soft. Dressings in place, clean and dry. DARRELL drain in place with serosanguinous fluid in bulb.    LABS:  Recent Labs   Lab Test 08/30/19  0040 08/29/19  0651 08/28/19  1001 08/27/19  1603   WBC 17.1* 16.1* 8.4 11.7*   HGB 11.9 10.0*  --  11.0*   MCV 95 96  --  97    283  --  303      Recent Labs   Lab Test 08/30/19  0040 08/29/19  0651 08/27/19  1603   POTASSIUM 4.4 4.4 4.7   CHLORIDE 105 107 102   CO2 33* 32 36*   BUN 44* 40* 37*   CR 0.82 0.77 0.69   ANIONGAP 4 3 1*               -------------------------------    Idalia Ricketts PA-C  [facility]  [phone]      "

## 2019-08-30 NOTE — OR NURSING
Reintubated at request of intensivist after reviewing ABG's. Short conversation with patient prior to intubation. ETT is 22 cm at teeth.

## 2019-08-30 NOTE — PROGRESS NOTES
[facility] ICU RESPIRATORY NOTE        Date of Admission: 8/27/2019    Date of Intubation (most recent): 8/30    Reason for Mechanical Ventilation: Respiratory failure/ COPD exacerbation     Number of Days on Mechanical Ventilation: 1    Met Criteria for Pressure Support Trial: No    Reason for Stopping Pressure Support Trial: Per MD      Significant Events Today: None    ABG Results:   Recent Labs   Lab 08/30/19  0550 08/28/19  1001   PH 7.23*  --    PCO2 77*  --    PO2 105  --    HCO3 32*  --    O2PER  --  30% Bipap       Current Vent Settings: Ventilation Mode: CMV/AC  (Continuous Mandatory Ventilation/ Assist Control)  FiO2 (%): 40 %  Rate Set (breaths/minute): 16 breaths/min  Tidal Volume Set (mL): 400 mL  PEEP (cm H2O): 54 cmH2O  Oxygen Concentration (%): 30 %  Resp: 16      Skin Assessment: Clean and dry/ Intact    Plan: Remains on AC/CMV. Bs's are coarse/dimnished. All nebs given as ordered. Will wean as tolerated when is able per protocol.      Luis Bazan, RT

## 2019-08-31 NOTE — PROGRESS NOTES
Critical Care Progress Note      08/31/2019    Name: Michelle Polo MRN#: [id number]   Age: 87 year old YOB: 1931     Hsptl Day# 3  ICU DAY #0    MV DAY #0             Problem List:   Active Problems:    COPD exacerbation (H)  gastric perforation s/p repair  Acute hypoxemic respiratory failure requiring intubation and mechanical ventilation           Summary/Hospital Course:     87F lives independently with pmh of copd, pvd, CAD who was admitted 8/27 with a copd exacerbation.  Overnight on 8/29 she developed a gastric-duodenal perforation and was taken to the OR for repair.  Extubation following the OR was attempted but failed due to acute hypercapnia, the patient was re-intubated.      Assessment and plan :     Michelle Polo IS a 87 year old female admitted on 8/27/2019 for acute hypercapnic respiratory failure.   I have personally reviewed the daily labs, imaging studies, cultures and discussed the case with referring physician and consulting physicians.   My assessment and plan by system for this patient is as follows:    Neurology/Psychiatry:   1. Analgesia:  Dilaudid prn  2.  Sedation: propofol     Cardiovascular:   1. Hemodynamics: Hypertensive. At present. Prn iv hydralazine and labetalol, unable to give po antihypertensives now  2.  H/o HLD: hold po statin for now  3.  H/o CAD: hold po asa for now  4 A fib RVR: new. Became bradycardic with IV amio. Will place on  IV metoprolol PRN.Will consider diltiazem gtt.     Pulmonary/Ventilator Management:   1. Acute hypercapnic respiratory failure, vent-dependent:  Failed extubation following OR due to hypercapnia: 7.23/77. COPD exacerbation vs meds.  Lungs clear on chest ct on admission so feel pna less likely.  On steroids and duonebs. She did have small to moderate b/l pleural effusions on admission, however they do not seem sufficiently large to be contributing much to her respiratory failure.  2.  H/o COPD: Had obstructive disease on spirometry in  2014 done [facility].  FVC 0.96, FEV-1 0.65 (67%).    GI and Nutrition :   1. NPO, advance diet if/when cleared by surgery  2. PUD:  Bid PPI  3. S/p gastroduodenal perf:  Appreciate surgery support    Renal/Fluids/Electrolytes:   1. Oliguric at present. Cr 0.97.  - Check FeNA, UA. ? Preload dependence. Increase MIV to 100 ml / hour. Also 500 ml fluid flush.     Infectious Disease:   1. Empirically on zosyn for abdominal coverage.    Endocrine:   1.  Stress induced hyperglycemia:  Ok in 130s for now.  Insulin as needed.    Hematology/Oncology:   1. Leukocytosis to 17--> 11.7.  2.  hgb 9.1  3.  pltlts 444 ---> 197. Will need to monitor    IV/Access:   1. Venous access - Subclavian TLC    ICU Prophylaxis:   1. DVT: Hep Subq/ mechanical  2. VAP: HOB 30 degrees, chlorhexidine rinse  3. Stress Ulcer: PPI  4. Restraints: Nonviolent soft two point restraints required and necessary for patient safety and continued cares and good effect as patient continues to pull at necessary lines, tubes despite education and distraction. Will readdress daily.   5. Wound care - per unit routine   6. Feeding - npo   7. Family Update:see below  8. Disposition - icu    Family meeting 8/30/2019:  DNR for now.         Interim History:     See above.         Key Medications:       calcium chloride  1 g Intravenous Once     chlorhexidine  15 mL Mouth/Throat BID     heparin  5,000 Units Subcutaneous Q12H     ipratropium - albuterol 0.5 mg/2.5 mg/3 mL  3 mL Nebulization 4x daily     methylPREDNISolone  62.5 mg Intravenous Q12H     pantoprazole (PROTONIX) IV  40 mg Intravenous BID     piperacillin-tazobactam  2.25 g Intravenous Q6H     sodium chloride 0.9 %  60 mL Intravenous Once     sodium chloride (PF)  3 mL Intracatheter Q8H       amiodarone Stopped (08/31/19 0630)     - MEDICATION INSTRUCTIONS -       - MEDICATION INSTRUCTIONS -       propofol (DIPRIVAN) infusion 35 mcg/kg/min (08/31/19 0545)     sodium chloride 75 mL/hr at 08/31/19 0001                Physical Examination:   Temp:  [96.8  F (36  C)-97.8  F (36.6  C)] 97.8  F (36.6  C)  Pulse:  [] 73  Heart Rate:  [] 62  Resp:  [12-21] 15  BP: ()/(39-92) 141/53  MAP:  [41 mmHg-95 mmHg] 93 mmHg  Arterial Line BP: ()/(27-58) 169/57  FiO2 (%):  [30 %] 30 %  SpO2:  [95 %-100 %] 99 %      Intake/Output Summary (Last 24 hours) at 8/31/2019 1347  Last data filed at 8/31/2019 1200  Gross per 24 hour   Intake 2968.45 ml   Output 1090 ml   Net 1878.45 ml       Wt Readings from Last 4 Encounters:   08/31/19 51 kg (112 lb 7 oz)   08/12/19 48.5 kg (107 lb)   07/16/19 50.8 kg (112 lb)   01/13/16 46.3 kg (102 lb)     Arterial Line BP: ()/(27-58) 169/57  MAP:  [41 mmHg-95 mmHg] 93 mmHg  BP - Mean:  [66-95] 81  Ventilation Mode: CMV/AC  (Continuous Mandatory Ventilation/ Assist Control)  FiO2 (%): 30 %  Rate Set (breaths/minute): 16 breaths/min  Tidal Volume Set (mL): 400 mL  PEEP (cm H2O): 5 cmH2O  Oxygen Concentration (%): 30 %  Resp: 15    Recent Labs   Lab 08/31/19  0604 08/30/19  1603 08/30/19  0550 08/28/19  1001   PH 7.51* 7.51* 7.23*  --    PCO2 37 39 77*  --    PO2 136* 67* 105  --    HCO3 29* 32* 32*  --    O2PER  --   --   --  30% Bipap     GEN: no acute distress, sedated   HEENT: head ncat, sclera anicteric, OP patent, trachea midline   PULM: unlabored synchronous with vent, clear anteriorly    CV/COR: RRR S1S2 no gallop,  No rub, no murmur  ABD: soft, tenderness appropriate to procedure, hypoactive bowel sounds, no mass  EXT:  minimal  Edema,   Warm x4  NEURO: grossly intact, sedated  SKIN: no obvious rash  LINES: clean, dry intact         Data:   All data and imaging reviewed     ROUTINE ICU LABS (Last four results)  CMP  Recent Labs   Lab 08/31/19  0558 08/30/19  0040 08/29/19  0651 08/27/19  1603    142 142 139   POTASSIUM 3.4 4.4 4.4 4.7   CHLORIDE 107 105 107 102   CO2 29 33* 32 36*   ANIONGAP 7 4 3 1*   * 130* 122* 85   BUN 46* 44* 40* 37*   CR 0.97 0.82 0.77  0.69   GFRESTIMATED 52* 64 69 78   GFRESTBLACK 61 74 80 >90   MICHELLE 7.8* 9.2 8.6 8.8   MAG 2.2 2.8*  --  2.3   PHOS 4.2  --   --   --    PROTTOTAL  --   --   --  6.2*   ALBUMIN  --   --   --  2.9*   BILITOTAL  --   --   --  0.2   ALKPHOS  --   --   --  142   AST  --   --   --  23   ALT  --   --   --  27     CBC  Recent Labs   Lab 08/31/19  0558 08/30/19  0040 08/29/19  0651 08/28/19  1001 08/27/19  1603   WBC 11.7* 17.1* 16.1* 8.4 11.7*   RBC 2.94* 3.96 3.32*  --  3.74*   HGB 9.1* 11.9 10.0*  --  11.0*   HCT 26.9* 37.7 31.7*  --  36.1   MCV 92 95 96  --  97   MCH 31.0 30.1 30.1  --  29.4   MCHC 33.8 31.6 31.5  --  30.5*   RDW 15.6* 15.6* 15.6*  --  15.6*    444 283  --  303     INRNo lab results found in last 7 days.  Arterial Blood Gas  Recent Labs   Lab 08/31/19  0604 08/30/19  1603 08/30/19  0550 08/28/19  1001   PH 7.51* 7.51* 7.23*  --    PCO2 37 39 77*  --    PO2 136* 67* 105  --    HCO3 29* 32* 32*  --    O2PER  --   --   --  30% Bipap       All cultures:  No results for input(s): CULT in the last 168 hours.  Recent Results (from the past 24 hour(s))   XR Chest Port 1 View    Narrative    CHEST ONE VIEW SEMI-UPRIGHT 8/30/2019 9:10 PM     HISTORY: Confirm central line placement.    COMPARISON: August 27, 2019      Impression    IMPRESSION: New right subclavian line with tip in the mid to low SVC.  No pneumothorax demonstrated. Question minimal effusion on the right  similar to previous. Endotracheal tube tip mid trachea. Atelectasis  and effusion on the left is improved compared to previous.    DALLAS CHATMAN MD     Labs (personally reviewed/interpreted):  See a/p  CT chest (personally reviewed/interpreted):  See a/p    Billing: This patient is critically ill: Yes. Total critical care time today 35 min.        Evaluated again    1 SBT done on 10/5. ET tube size is 7. TV ~ 250, RR ~ 22-26. Placed back on full support.  2 Low urine output. MIV at 50 ml / hr. FeNA = 0.1. UA sp gravity is 1.025.  ml  bolus and increase MIV to 100 ml/ hour.

## 2019-08-31 NOTE — PLAN OF CARE
Pt sedated on propofol 35 mcg/kg/min, RASS -1 with vent settings per MD order, tolerating well. Pt opens eyes to voice, follows commands appropriately. At beginning of shift pt BP hypotensive with MAP 30-40s with runs of Vtach into 130s, MD aware. Pt given Amiodarone bolus, then placed on amiodarone gtt, CVC placed in R subclavian. HR now SR with PACs, BP improved with MAP > 60. Amiodarone turned off around 0630 d/t sustained bradycardia in the 40-50s. Urine output again decreased during shift, with adequate output at beginning with lasix administration during previous shift, now UO about 15-20 ml/hr, MD notified. Family updated on pt condition.

## 2019-08-31 NOTE — PROGRESS NOTES
[facility] ICU RESPIRATORY NOTE        Date of Admission: 8/27/2019    Date of Intubation (most recent): 8/30/09    Reason for Mechanical Ventilation: Respiratory failure/COPD exacerbation    Number of Days on Mechanical Ventilation: 1    Met Criteria for Pressure Support Trial: No    Reason for No Pressure Support Trial: per MD    Significant Events Today: None    ABG Results:   Recent Labs   Lab 08/30/19  1603 08/30/19  0550 08/28/19  1001   PH 7.51* 7.23*  --    PCO2 39 77*  --    PO2 67* 105  --    HCO3 32* 32*  --    O2PER  --   --  30% Bipap       Current Vent Settings: Ventilation Mode: CMV/AC  (Continuous Mandatory Ventilation/ Assist Control)  FiO2 (%): 30 %  Rate Set (breaths/minute): 16 breaths/min  Tidal Volume Set (mL): 400 mL  PEEP (cm H2O): 5 cmH2O  Oxygen Concentration (%): 30 %  Resp: 16      Plan: Continue full ventilatory support.  Assess for weaning criteria in AM    Maame Webb, RT

## 2019-08-31 NOTE — PROCEDURES
Procedure Note:  Procedure:  R subclavian central venous catheter placement  Surgeon: Dominique Ortega MD      Indication:  Hemodynamic instability    The right chest and neck were prepped in the usual sterile fashion.   Time out was performed.    The right subclavian artery was accessed using a large bore needle.  A guidewire was passed through the needle without resistance.  The tract was dilated and a 3 lumen catheter was then placed over the wire.  Only the white port flush or aspirated.      CXR  Pending.    Pt tolerated the procedure well.    Dominique Ortega MD  08/30/19

## 2019-08-31 NOTE — PROGRESS NOTES
[facility] ICU RESPIRATORY NOTE        Date of Admission: 8/27/2019    Date of Intubation (most recent): 8/30/2019    Reason for Mechanical Ventilation: Respiratory failure    Number of Days on Mechanical Ventilation: 2    Met Criteria for Pressure Support Trial:Yes  Length of Pressure Support Trial: 1 hours and 10 minute    Significant Events Today: PS 10/5 was performed for 1 hour and 10 minute per MD.     ABG Results:   Recent Labs   Lab 08/31/19  0604 08/30/19  1603 08/30/19  0550 08/28/19  1001   PH 7.51* 7.51* 7.23*  --    PCO2 37 39 77*  --    PO2 136* 67* 105  --    HCO3 29* 32* 32*  --    O2PER  --   --   --  30% Bipap       Current Vent Settings: Ventilation Mode: CMV/AC  (Continuous Mandatory Ventilation/ Assist Control)  FiO2 (%): 30 %  Rate Set (breaths/minute): 16 breaths/min  Tidal Volume Set (mL): 400 mL  PEEP (cm H2O): 5 cmH2O  Pressure Support (cm H2O): 10 cmH2O  Oxygen Concentration (%): 30 %  Resp: 11      Skin Assessment: Good     Plan: Continue on full ventilator support and will assess daily for weaning.     Brian Razo, RT

## 2019-08-31 NOTE — PROGRESS NOTES
[facility] ICU RESPIRATORY NOTE        Date of Admission: 8/27/2019    Date of Intubation (most recent): 8/30/19    Reason for Mechanical Ventilation: respiratory failure/COPD exacerbation    Number of Days on Mechanical Ventilation: 2    Met Criteria for Pressure Support Trial: No    Reason for No Pressure Support Trial: per MD    Significant Events Today: none    ABG Results:   Recent Labs   Lab 08/30/19  1603 08/30/19  0550 08/28/19  1001   PH 7.51* 7.23*  --    PCO2 39 77*  --    PO2 67* 105  --    HCO3 32* 32*  --    O2PER  --   --  30% Bipap       Current Vent Settings: Ventilation Mode: CMV/AC  (Continuous Mandatory Ventilation/ Assist Control)  FiO2 (%): 30 %  Rate Set (breaths/minute): 16 breaths/min  Tidal Volume Set (mL): 400 mL  PEEP (cm H2O): 5 cmH2O  Oxygen Concentration (%): 30 %  Resp: 21        Plan: continue ventilatory support    Lalo Lopez, RT

## 2019-08-31 NOTE — PROGRESS NOTES
Surgery    S: Patient intubated.  Follows commands.  Denies abdominal discomfort.  She developed some tachycardia, hypotension yesterday prompting CVC placement.  Responded to amiodarone, however discontinued this morning due to bradycardia.    O: Intubated, sedated on some propofol but opens eyes follows commands.  Maintaining sats on minimal ventilator settings  Intermittent bradycardia, frequent PACs, but currently NSR  Not requiring pressors  CVC in place in right subclavian  Abdomen is soft, minimally distended.  Incisions are intact and dry except for some scant s/s drainage around the drain site.  DARRELL drain with minimal s/s output.  Abel in place with small amount dilute urine  WWP    DARRELL: 500 (80)  UOP: 1275 (285)    WBC down-trending 17>11  Hgb 11.9->9.1  Cr ok in spite of downtrending UOP.    A/P: 87F initially admitted for fall complicated by respiratory failure.  She developed acute abdominal pain 8/29 and CT showed a perforated ulcer.  She is now POD1-2 s/p chintan patch repair of perforated duodenal ulcer with Dr. Christopher 8/30.  Extubation following the OR was attempted but failed due to acute hypercapnia, the patient was re-intubated.      She remains intubated on the ICU, but is recovering from a surgical standpoint, with benign abdominal examination, non-concerning drain output, down trending leukocytosis.    -- Appreciate ICU cares.  Extubation if meets parameters.  -- Continue protonix, zosyn,   -- Consider recheck of Hgb later today, suspect some hemodilution, but trend for stability  -- Continue DARRELL drain.  Strip Qshift.  Watch abdominal examination.  -- Continue NPO with NG placement, await DAVEY.    Zenobia Figueroa MD  PGY4 General Surgery Resident

## 2019-09-01 NOTE — PROGRESS NOTES
ICU Staff Addendum:    Asked to evaluate patient by bedside nursing. Patient with episode of self limited SVT (reportedly lost pulse?). Extubated to  several hours earlier in day.  O/E: Patient awake and able to converse -on FM 02 VS stable-no dysrhythmia.  Chest: Diffuse high pitched wheezing at end expiration.  CVS: HS 1 + S2   Extremities: Diffusely edematous UE/LE 2+    Reviewed history/ labs/imaging and ECHO  Moderate PHTN, mild MR with significant valve calcification and at least grade 1 DD  I/O ~ 5 L +ve since admit.  Prior substantial tobacco use    Impression:  1. ?limited SVT  2. Above likely antagonized by above cardio-pulmonary status   - likely  Pulmonary vascular congestion   - Underlying obstructive airway disease/COPD    Plan:  1. Check-electrolytes  2. Empiric magnesium 1 gram IV  3. Xopenex neb  4. Place on HF02 at least 25L and 50% 02  5. Diuresis-IV lasix ( K+ reasonable)      Jaylyn Caraballo MD  #[id number]

## 2019-09-01 NOTE — PROGRESS NOTES
Pt. At 1603 started intermittent SVT with rate of 180 to 200.  Pt would self terminate SVT. Pt going in and out of consciousness with SVT and and one time unable to palpate carotid pulse and verified with flat line A Line.  BP low by alyse when having SVT--see vital sign flow sheet.  1 gram of magnesium given, started on high flow, K returned at 4.0 and Lasix IV given as per order.  After 1705 no further episodes of SVT with rates of 180--200.  Lopressor given at 1712 for Heart rate of 130 and BP ws 134/50 by alyse.   Rhy now sinus ryh with freq Pacs.  Pt lethargic, but oriented to place, person, year and situration.  Family at bedside and remained calm throughout.

## 2019-09-01 NOTE — PROGRESS NOTES
[facility] ICU RESPIRATORY NOTE           Date of Admission: 8/27/2019     Date of Intubation (most recent): 8/30/2019     Reason for Mechanical Ventilation: Respiratory failure     Number of Days on Mechanical Ventilation: 3     Met Criteria for Pressure Support Trial:Yes  Length of Pressure Support Trial:    Significant Events Today: None overnight     ABG Results:   Recent Labs   Lab 08/31/19  0604 08/30/19  1603 08/30/19  0550 08/28/19  1001   PH 7.51* 7.51* 7.23*  --    PCO2 37 39 77*  --    PO2 136* 67* 105  --    HCO3 29* 32* 32*  --    O2PER  --   --   --  30% Bipap     Ventilation Mode: CMV/AC  (Continuous Mandatory Ventilation/ Assist Control)  FiO2 (%): 30 %  Rate Set (breaths/minute): 12 breaths/min  Tidal Volume Set (mL): 400 mL  PEEP (cm H2O): 5 cmH2O  Pressure Support (cm H2O): 10 cmH2O  Oxygen Concentration (%): 30 %  Resp: 12    Will continue to follow.     Paresh Frye, RT

## 2019-09-01 NOTE — SIGNIFICANT EVENT
Met family. Updated them about current medical situation, including SBT where she tolerated PS 7/5.     Family will like to follow Ms Polo advanced directives. Plan is to proceed with extubation to face mask oxygen. If she needs NIPPV, a trial of BiPAP 10/5 but not higher than that considering recent surgery for the gastroduodenal ulcer. If that is not adequate plan to move to comfort measures.     Baron Claire MD

## 2019-09-01 NOTE — PROGRESS NOTES
Critical Care Progress Note      09/01/2019    Name: Michelle Polo MRN#: [id number]   Age: 87 year old YOB: 1931     Hsptl Day# 4  ICU DAY #0    MV DAY #0             Problem List:   Active Problems:    COPD exacerbation (H)  gastric perforation s/p repair  Acute hypoxemic respiratory failure requiring intubation and mechanical ventilation         Summary/Hospital Course:     87F lives independently with pmh of copd, pvd, CAD who was admitted 8/27 with a copd exacerbation.  Overnight on 8/29 she developed a gastric-duodenal perforation and was taken to the OR for repair.  Extubation following the OR was attempted but failed due to acute hypercapnia, the patient was re-intubated.      Assessment and plan :     Michelle Polo IS a 87 year old female admitted on 8/27/2019 for acute hypercapnic respiratory failure.   I have personally reviewed the daily labs, imaging studies, cultures and discussed the case with referring physician and consulting physicians.   My assessment and plan by system for this patient is as follows:    Neurology/Psychiatry:   1. Analgesia:  Dilaudid prn  2.  Sedation: propofol.  Plan  Hold sedation in anticipation for extubation    Cardiovascular:   1. Hemodynamics: Hypertensive. At present. Prn iv hydralazine and labetalol, unable to give po antihypertensives now  2.  H/o HLD: hold po statin for now  3.  H/o CAD: hold po asa for now  4 A fib RVR: new. Became bradycardic with IV amio. IV prn metoprolol.    Pulmonary/Ventilator Management:   1. Acute hypercapnic respiratory failure, vent-dependent:  Failed extubation following OR due to hypercapnia: 7.23/77. COPD exacerbation vs meds.  Lungs clear on chest ct on admission so feel pna less likely.  On steroids and duonebs. She did have small to moderate b/l pleural effusions on admission, however they do not seem sufficiently large to be contributing much to her respiratory failure.  Plan: RSBI < 100. Meets criteria for extubation.  Will need to clarify with family the reintubation plan prior to extubation.   2.  H/o COPD: Had obstructive disease on spirometry in 2014 done at [facility].  FVC 0.96, FEV-1 0.65 (67%).    GI and Nutrition :   1. NPO, advance diet if/when cleared by surgery  2. PUD:  Bid PPI  3. S/p gastroduodenal perf:  Appreciate surgery support    Renal/Fluids/Electrolytes:   1.Creatinine.WNL.     Infectious Disease:   1. Empirically on zosyn for abdominal coverage.    Endocrine:   1.  Stress induced hyperglycemia:  Ok in 130s for now.  Insulin as needed.    Hematology/Oncology:   1. Leukocytosis to 17--> 11.7.  2.  hgb 9.1  3.  pltlts 444 ---> 197. Will need to monitor    IV/Access:   1. Venous access - Subclavian TLC    ICU Prophylaxis:   1. DVT: Hep Subq/ mechanical  2. VAP: HOB 30 degrees, chlorhexidine rinse  3. Stress Ulcer: PPI  4. Restraints: Nonviolent soft two point restraints required and necessary for patient safety and continued cares and good effect as patient continues to pull at necessary lines, tubes despite education and distraction. Will readdress daily.   5. Wound care - per unit routine   6. Feeding - npo   7. Family Update:see below  8. Disposition - icu    Family meeting 8/30/2019:  DNR for now.         Interim History:     RSBI high yesterday. Also intermittent narrow QRS tachycardia.         Key Medications:       chlorhexidine  15 mL Mouth/Throat BID     heparin  5,000 Units Subcutaneous Q12H     ipratropium - albuterol 0.5 mg/2.5 mg/3 mL  3 mL Nebulization 4x daily     [START ON 9/2/2019] methylPREDNISolone  62.5 mg Intravenous Daily     pantoprazole (PROTONIX) IV  40 mg Intravenous BID     piperacillin-tazobactam  3.375 g Intravenous Q6H     sodium chloride 0.9 %  60 mL Intravenous Once     sodium chloride (PF)  3 mL Intracatheter Q8H       amiodarone Stopped (08/31/19 0630)     - MEDICATION INSTRUCTIONS -       - MEDICATION INSTRUCTIONS -       propofol (DIPRIVAN) infusion Stopped (09/01/19 0900)      sodium chloride 100 mL/hr at 09/01/19 0928               Physical Examination:   Temp:  [97  F (36.1  C)-97.9  F (36.6  C)] 97.3  F (36.3  C)  Pulse:  [60-92] 92  Heart Rate:  [] 88  Resp:  [11-19] 17  BP: (134-161)/(47-79) 134/47  MAP:  [55 mmHg-134 mmHg] 77 mmHg  Arterial Line BP: (109-205)/(33-85) 128/45  FiO2 (%):  [30 %] 30 %  SpO2:  [96 %-100 %] 96 %      Intake/Output Summary (Last 24 hours) at 8/31/2019 1347  Last data filed at 8/31/2019 1200  Gross per 24 hour   Intake 2968.45 ml   Output 1090 ml   Net 1878.45 ml       Wt Readings from Last 4 Encounters:   08/31/19 53.5 kg (117 lb 15.1 oz)   08/12/19 48.5 kg (107 lb)   07/16/19 50.8 kg (112 lb)   01/13/16 46.3 kg (102 lb)     Arterial Line BP: (109-205)/(33-85) 128/45  MAP:  [55 mmHg-134 mmHg] 77 mmHg  BP - Mean:  [] 74  Ventilation Mode: CPAP/PS  (Continuous positive airway pressure with Pressure Support)  FiO2 (%): 30 %  Rate Set (breaths/minute): 12 breaths/min  Tidal Volume Set (mL): 400 mL  PEEP (cm H2O): 5 cmH2O  Pressure Support (cm H2O): 8 cmH2O  Oxygen Concentration (%): 30 %  Resp: 17    Recent Labs   Lab 09/01/19  0915 08/31/19  0604 08/30/19  1603 08/30/19  0550 08/28/19  1001   PH 7.34* 7.51* 7.51* 7.23*  --    PCO2 45 37 39 77*  --    PO2 98 136* 67* 105  --    HCO3 25 29* 32* 32*  --    O2PER 30%  --   --   --  30% Bipap     GEN: no acute distress, sedated   HEENT: head ncat, sclera anicteric, OP patent, trachea midline   PULM: unlabored synchronous with vent, clear anteriorly    CV/COR: RRR S1S2 no gallop,  No rub, no murmur  ABD: soft, tenderness appropriate to procedure, hypoactive bowel sounds, no mass  EXT:  minimal  Edema,   Warm x4  NEURO: grossly intact, sedated  SKIN: no obvious rash  LINES: clean, dry intact         Data:   All data and imaging reviewed     ROUTINE ICU LABS (Last four results)  CMP  Recent Labs   Lab 09/01/19  0506 08/31/19  1358 08/31/19  0558 08/30/19  0040 08/29/19  0651 08/27/19  1603   * 147*  143 142 142 139   POTASSIUM 3.2*  --  3.4 4.4 4.4 4.7   CHLORIDE 115*  --  107 105 107 102   CO2 28  --  29 33* 32 36*   ANIONGAP 5  --  7 4 3 1*   GLC 86  --  112* 130* 122* 85   BUN 42*  --  46* 44* 40* 37*   CR 0.78 0.92 0.97 0.82 0.77 0.69   GFRESTIMATED 68 56* 52* 64 69 78   GFRESTBLACK 78 65 61 74 80 >90   MICHELLE 8.2*  --  7.8* 9.2 8.6 8.8   MAG  --   --  2.2 2.8*  --  2.3   PHOS 4.7*  --  4.2  --   --   --    PROTTOTAL  --   --   --   --   --  6.2*   ALBUMIN  --   --   --   --   --  2.9*   BILITOTAL  --   --   --   --   --  0.2   ALKPHOS  --   --   --   --   --  142   AST  --   --   --   --   --  23   ALT  --   --   --   --   --  27     CBC  Recent Labs   Lab 09/01/19  0506 08/31/19  0558 08/30/19  0040 08/29/19  0651   WBC 9.9 11.7* 17.1* 16.1*   RBC 2.95* 2.94* 3.96 3.32*   HGB 8.9* 9.1* 11.9 10.0*   HCT 27.3* 26.9* 37.7 31.7*   MCV 93 92 95 96   MCH 30.2 31.0 30.1 30.1   MCHC 32.6 33.8 31.6 31.5   RDW 15.7* 15.6* 15.6* 15.6*    197 444 283     INRNo lab results found in last 7 days.  Arterial Blood Gas  Recent Labs   Lab 09/01/19  0915 08/31/19  0604 08/30/19  1603 08/30/19  0550 08/28/19  1001   PH 7.34* 7.51* 7.51* 7.23*  --    PCO2 45 37 39 77*  --    PO2 98 136* 67* 105  --    HCO3 25 29* 32* 32*  --    O2PER 30%  --   --   --  30% Bipap       All cultures:  No results for input(s): CULT in the last 168 hours.  No results found for this or any previous visit (from the past 24 hour(s)).  Labs (personally reviewed/interpreted):  See a/p  CT chest (personally reviewed/interpreted):  See a/p    Billing: This patient is critically ill: Yes. Total critical care time today 35 min.

## 2019-09-01 NOTE — PROGRESS NOTES
Pt was extubated @11:10 am and placed on aerosol mask 40%, no stridor post extubation.     Brian Razo RT.

## 2019-09-01 NOTE — PLAN OF CARE
Pt sedated on propofol 25 mcg/kg/min, RASS -1 to -2 with vent settings per MD order, tolerating well. Pt opens eyes to voice, follows commands appropriately. Pt occasionally hypertensive with prn metoprolol given as well as hydralazine. Tele SR with PACs. Urine output again decreased during shift, now UO about 15-20 ml/hr, MD notified. Potassium replacement protocol started for K 3.2. Will continue to monitor.

## 2019-09-01 NOTE — PLAN OF CARE
PT: Pt with increasing medical complexity, per notes if current measures do not allow the pt to improve the pt will move toward comfort cares. PT order completed at this time.    Physical Therapy Discharge Summary    Reason for therapy discharge:    Change in medical status.    Progress towards therapy goal(s). See goals on Care Plan in [facility] electronic health record for goal details.  Goals not met.  Barriers to achieving goals:   limited tolerance for therapy.    Therapy recommendation(s):    No further therapy is recommended.Should pt's status change and is able to show progress toward functional mobility goals, please re-order physical therapy.

## 2019-09-01 NOTE — PROGRESS NOTES
Pt was placed on HFNC 30 lpm and 50%, mepilex and optifaom were all placed on the ears and cheeks. Neb tx were given as ordered, BBS diminished. RT will continue to follow the pt.     Brian Razo RT.

## 2019-09-01 NOTE — PROGRESS NOTES
Surgery    S: Patient intubated sedated.  Will wake up and follow commands when weaned.  No acute events overnight.  She did not meet parameters on pressure support trials.  Small tidal volumes on PS 10.  Oliguria.    O: Intubated, sedated on some propofol  Intermittent bradycardia, frequent PACs, but currently NSR  Not requiring pressors  CVC in place in right subclavian  Abdomen is soft, minimally distended.  Incisions are intact and dry except for some scant s/s drainage around the drain site.  DARRELL drain with minimal serous output.  Abel in place  WWP  NG in place with scant brown output    DARRELL: 190 (50)cc  UOP: 645 (145)cc  N    WBC has normalized today.  Hgb 9.1->8.9  Cr ok in spite of downtrending UOP.    A/P: 87F initially admitted for fall complicated by respiratory failure.  She developed acute abdominal pain  and CT showed a perforated ulcer.  She is now POD2-3 s/p chintan patch repair of perforated duodenal ulcer with Dr. Christopher .  Extubation following the OR was attempted but failed due to acute hypercapnia, the patient was re-intubated.      She remains intubated on the ICU.  Recovering from a surgical standpoint, with benign abdominal examination, non-concerning drain output, WBC wnl.    -- Appreciate ICU cares.  Extubation if meets parameters.  -- Continue protonix, zosyn,   -- Continue DARRELL drain.  Strip Qshift.  Watch abdominal examination.  -- Continue NPO with NG placement, await DAVEY.    Zenobia Figueroa MD  PGY4 General Surgery Resident

## 2019-09-01 NOTE — PLAN OF CARE
Extubated this AM. Following commands, oriented. Pain controlled with prn dilaudid. Hydralazine x1. Family at bedside and updated on plan of care. Continue to monitor.

## 2019-09-02 NOTE — SIGNIFICANT EVENT
Discussed the care plan with the patient in presence of daughter.     Mr Polo expressed her desire to pursue care directed to comfort and pain management. She will not like to do any further testing.     Will stop antibiotics and place comfort care orders.     Baron Claire MD

## 2019-09-02 NOTE — PLAN OF CARE
Discharge Planner SLP   Patient plan for discharge: Did not state  Current status: SLP consulted due to,  recently extubated, tenuous resp status sp perforated duodenal ulcer repair on 8/30, assess aspiration risk, appropriateness PO intake  Pt also admitted with pnx and SLP consulted, but unable to complete due to respiratory status/surgery. No documented hx of dysphagia and pt denied dysphagia symptoms. Oral motor exam WFL with adequate strength and ROM. Bruising on right side of lingual surface, however, pt denied pain. Vocal quality clear. Okay for ice chips and sips of clears per surgery. Thin water via straw trialed with good oral control and timing and no overt s/sx of aspiration throughout. No appreciable dysphagia on limited evaluation, will plan to follow 3x/wk to further assess solids when okay with surgery.   Barriers to return to prior living situation: None  Recommendations for discharge: Per Care Team  Rationale for recommendations: Pt will likely meet SLP goals during hospitalization       Entered by: Hailee Delgado 09/02/2019 12:19 PM      Speech Language Therapy Discharge Summary    Reason for therapy discharge:    Patient/family request discontinuation of services.    Progress towards therapy goal(s). See goals on Care Plan in [facility] electronic health record for goal details.  Goals not met.  Barriers to achieving goals:   transition to comfort care.    Therapy recommendation(s):    No further therapy is recommended.  Will complete SLP orders. Diet per MD.

## 2019-09-02 NOTE — PLAN OF CARE
Pt A/O x 4, VSS except for elevated BP up to 160s systolic, prn metoprolol given x3 during shift. Tele SR with occasional PACs. Pt complained of minimal pain during shift until around 0600, pain was 8/10 in the abdomen which was also distended with serous drainage leaking around DARRELL site. Prn IV dilaudid given and attempt was made to strip DARRELL, a hard/clear plug was noted at DARRELL insertion site. General surgery paged about this with no response yet. Pt remains on HFNC, tolerating well. Family updated on pt condition at beginning of shift.

## 2019-09-02 NOTE — PROGRESS NOTES
[facility] Intensive Care Progress Note    Date of Service (when I saw the patient): 09/02/2019     Assessment & Plan   Michelle Polo is a 87 year old female who was admitted on 8/27/2019 for respiratory distress. Found to have perforated duodenal ulcer s/p lap [place]-Joshua patch repair   POD 4.     Neurology/Pain/Sedation:  #Encephalopathy 2/2 sepsis   Oriented x 3 today, no agitation  Frequent re-orientation  Legally blind  Continue tylenol & dilaudid     Cardiovascular:  #HFpEF, Pulmonary HTN, hx of SVT  Will restart home cardiac medications  Cardiac monitoring   Will remove central line    Pulmonary:        #Acute Hypoxemic Respiratory Failure 2/2 anesthesia & sepsis  #b/l pleural effusion   S/p extubation yesterday to HFNC 30%/20L SaO2 mid 90s - wean  Continue diuresis  Aspiration precaution  Acapella device/RT    Renal:  Normal kidney function   Hypernatremic - on D5W  Monitor electrolytes (K3.7, Phos 4.3, Mag 2.6)  Lasix for diuresis     ID:         Intra-abdominal sepsis 2/2 perforated DU  Continue Zosyn (Day 4)     GI:  #Ileus  #Perforated DU  Bedside US done - no evidence of abscess or fluid collection   NGT removed   Continue PPIs    Nutrition:  Abdomen distended; c/o abd pain w/ leukocytosis  Sips of clear per GS  Serial exams  DARRELL with ?fibrin glue - not draining - recommend removal   Speech & swallow     Endocrine:  FS at goal - not diabetic    Heme/Onc:  Leukocytosis   Mild anemia   LE negative for DVT     DVT Prophylaxis: Heparin SQ  GI Prophylaxis: PPI    Restraints: Restraints for medical healing needed: NO    Family update by me today: No    Juan Malloy    Time Spent on this Encounter   Billing:  I spent 30 minutes bedside and on the inpatient unit today managing the critical care of Michelle Polo in relation to the issues listed in this note.    Main Plans for Today   Speech & Swallow eval  Monitor Arrhythmias & electrolytes  Restart home  medication regime    Interval History   Frequent runs of SVT with associated hypotension     Physical Exam   Temp: 96.8  F (36  C) Temp src: Axillary Temp  Min: 96.8  F (36  C)  Max: 99  F (37.2  C) BP: (!) 172/69 Pulse: 81 Heart Rate: 73 Resp: 14 SpO2: 94 % O2 Device: High Flow Nasal Cannula (HFNC) Oxygen Delivery: Other (Comments)(20L)  Vitals:    08/31/19 0000 08/31/19 2200 09/02/19 0600   Weight: 51 kg (112 lb 7 oz) 53.5 kg (117 lb 15.1 oz) 55 kg (121 lb 4.1 oz)     I/O last 3 completed shifts:  In: 1426.5 [I.V.:1426.5]  Out: 2825 [Urine:2490; Emesis/NG output:250; Drains:85]    Neurologic: moving all fours, 5/5 power, no deficits, GCS 14/15 (E3)  HEENT: moist mucous membranes, neck supple.   Cardiovascular: RRR, no murmurs appreciated  Respiratory: global diminished breath sounds with poor inspiratory effort; right subclavian central line  GI: abd soft, distended, diminished bowel sounds, scars healing well; DARRELL with thick clear product within - unable to strip   Skin/Extremities: 1+ pedal edema R foot, no edema on legs b/l. B/l UE ecchymoses, no tender; R radial a-line  Lines: No erythema or discharge at entry site for Arterial Line and Central Venous Catheter  Current lines are required for patient management    Medications     D5W 50 mL/hr at 09/02/19 0800     - MEDICATION INSTRUCTIONS -       - MEDICATION INSTRUCTIONS -         heparin  5,000 Units Subcutaneous Q12H     ipratropium - albuterol 0.5 mg/2.5 mg/3 mL  3 mL Nebulization 4x daily     pantoprazole (PROTONIX) IV  40 mg Intravenous BID     piperacillin-tazobactam  3.375 g Intravenous Q6H     sodium chloride (PF)  10 mL Intracatheter Q8H     sodium chloride (PF)  3 mL Intracatheter Q8H       Data   Recent Labs   Lab 09/02/19  0506 09/01/19  1620 09/01/19  1005 09/01/19  0506 08/31/19  1358 08/31/19  0558  08/27/19  1603   WBC  --  23.0*  --  9.9  --  11.7*   < > 11.7*   HGB  --  10.4*  --  8.9*  --  9.1*   < > 11.0*   MCV  --  94  --  93  --  92   < >  97   PLT  --  310  --  187  --  197   < > 303   NA  --  147*  --  148* 147* 143   < > 139   POTASSIUM 3.7 4.0 4.4 3.2*  --  3.4   < > 4.7   CHLORIDE  --  113*  --  115*  --  107   < > 102   CO2  --  26  --  28  --  29   < > 36*   BUN  --  42*  --  42*  --  46*   < > 37*   CR  --  0.75  --  0.78 0.92 0.97   < > 0.69   ANIONGAP  --  8  --  5  --  7   < > 1*   MICHELLE  --  8.4*  --  8.2*  --  7.8*   < > 8.8   GLC  --  155*  --  86  --  112*   < > 85   ALBUMIN  --  2.6*  --   --   --   --   --  2.9*   PROTTOTAL  --  5.5*  --   --   --   --   --  6.2*   BILITOTAL  --  0.3  --   --   --   --   --  0.2   ALKPHOS  --  83  --   --   --   --   --  142   ALT  --  36  --   --   --   --   --  27   AST  --  20  --   --   --   --   --  23   TROPI  --   --   --   --   --   --   --  <0.015    < > = values in this interval not displayed.     Recent Results (from the past 24 hour(s))   US Upper Extremity Venous Duplex Bilateral Port    Narrative    US UPPER EXTREMITY VENOUS DUPLEX BILATERAL PORTABLE  9/1/2019 11:17 PM      HISTORY: Swelling of bilateral upper extremities, evaluate for deep  vein thrombus.     TECHNIQUE: Venous Doppler US of the upper extremity.? Color flow and  spectral Doppler with waveform analysis performed.    COMPARISON: None.    FINDINGS: Ultrasound of both upper extremities demonstrates no deep  vein thrombus in the subclavian, axillary or brachial veins. No  thrombus seen in the cephalic or basilic veins or visualized portions  of internal jugular veins.      Impression    IMPRESSION: No deep vein thrombus identified in the upper extremities.    JESSICA TOLEDO MD   US Lower Extremity Venous Duplex Bilateral    Narrative    US LOWER EXTREMITY VENOUS DUPLEX BILATERAL  9/1/2019 11:18 PM     HISTORY: Bilateral lower extremity swelling, evaluate for deep vein  thrombus.    TECHNIQUE: Venous Doppler US of bilateral lower extremities.? Color  flow and spectral Doppler with waveform analysis performed.    COMPARISON:  None.    FINDINGS: Ultrasound of both legs demonstrates no deep vein thrombus  from the common femoral through popliteal veins or in the visualized  segments of posterior tibial or peroneal veins in the calves.      Impression    IMPRESSION: No deep vein thrombus identified in either leg.    JESSICA TOLEDO MD

## 2019-09-02 NOTE — PROGRESS NOTES
Surgery    S: Patient successfully extubated yesterday to HFNC.  She is alert and oriented.  She endorses some abdominal tenderness.  Denies nausea or vomiting with NG in place.  No flatus or BM reported yet.      O: Awake, oriented, interactive.  HFNC without increased work of breathing  Frequent PACs, but currently NSR  Not requiring pressors  Abdomen is soft, minimally distended.  Incisions are intact and dry except for some scant s/s drainage around the drain site.  DARRELL drain with minimal serous output.  Noted possible plug at around the skin entrance site--attempted to strip without success.  5cc flush given without much resolution.    Abel in place  WWP  NG in place with brown output    DARRELL: 115 (50)  UOP: 2035 (630)cc  Ncc over yesterday    Labs pending today.    A/P: 87F initially admitted for fall complicated by respiratory failure.  She developed acute abdominal pain  and CT showed a perforated ulcer.  She is now POD3-4 s/p chintan patch repair of perforated duodenal ulcer with Dr. Christopher .  Extubation following the OR was attempted but failed due to acute hypercapnia, the patient was re-intubated.      She was extubated to HFNC .  Recovering from a surgical standpoint, with benign abdominal examination, non-concerning drain output, WBC wnl yesterday.      -- Appreciate ICU cares.   -- Ok with NG removal and SLP evaluation. Just sips/ice chips today if she is cleared for a diet given her somewhat tenuous respiratory status.  Continue to monitor for ROBF.  -- Continue to monitor drain output.  Aware of DARRELL drains issues, for now will leave in place.   -- Continue lyly mercado Charlotte Rogers, MD  PGY4 General Surgery Resident

## 2019-09-02 NOTE — PROGRESS NOTES
[facility]    Medicine Progress Note - Hospitalist Service       Date of Admission:  8/27/2019  Assessment & Plan   Michelle Polo is a 87 year-old F who presented with fall and respiratory failure, subsequently developed abdominal pain and was found to have a perforated ulcer which required surgical repair.  She failed initial extubation, was reintubated, later extubated and now is pursuing comfort measures only.  Transferring from ICU to hospitalist service on 9/2/19.    Acute hypoxic and hypercarbic respiratory failure  COPD with exacerbation  Recent sacral fracture  Old T12, L4 compression fracture   Sepsis secondary to perforated duodenal ulcer status post Joshua patch repair 8/30  Septic encephalopathy  Chronic diastolic congestive heart failure  Pulmonary hypertension  History of SVT  Bilateral pleural effusions  Hypernatremia  Hypertension   Coronary artery disease with history of NSTEMI  Peripheral arterial disease with history of revascularization  Hyperlipidemia   Leukocytosis   Advanced care planning    Patient presented initially with fall and COPD exacerbation. Subsequently developed abdominal pain and was found to have a perforated ulcer which required surgical repair.  She failed initial extubation, was reintubated, later extubated. She developed additional abdominal pain, and after discussion with patient and family by ICU providers, elected to pursue comfort measures only. Transferring from ICU to hospitalist service on 9/2/19 to continue comfort measures only.   - Continue comfort measures only  - Transition IV methylprednisolone to oral prednisone, reasonable this contributes to comfort  - Transition IV pantoprazole to oral pantoprazole, reasonable this contributes to comfort  - Regular diet for comfort  - Hydromprophone available for pain   - Transfer from ICU to comfort care bed  - Discontinue central line, telemetry   - Observe clinical course over next 24 hours. If remains  stable enough for discharge, may begin discharge planning to hospice.     Diet: NPO for Medical/Clinical Reasons Except for: No Exceptions    DVT Prophylaxis: Not indicated in comfort based approach   Abel Catheter: in place, indication: Strict 1-2 Hour I&O  Code Status: DNR/DNI      Disposition Plan   Expected discharge: Observe clinical course over next 24 hours. If remains stable enough for discharge, may begin discharge planning to hospice.   Entered: Dae Balderas MD 09/02/2019, 1:20 PM       The patient's care was discussed with the Patient and Patient's daughter.    Dae Balderas MD  Hospitalist Service  [facility]    ______________________________________________________________________    Interval History   Discussed ICU course and plan of care with intensivist, Dr. Claire. Met with patient and patient's daughter, they reaffirm plan for comfort measures only.  At present the patient denies any shortness of breath, abdominal pain while at rest, does have more back, abdomen and hip pain with movement.    Data reviewed today: I reviewed all medications, new labs and imaging results over the last 24 hours. I personally reviewed no images or EKG's today.    Physical Exam   Vital Signs: Temp: 97.4  F (36.3  C) Temp src: Axillary BP: (!) 141/60 Pulse: 87 Heart Rate: 82 Resp: 16 SpO2: 95 % O2 Device: High Flow Nasal Cannula (HFNC) Oxygen Delivery: Other (Comments)(25 LPM)  Weight: 121 lbs 4.05 oz    Constitutional: NAD  Respiratory: Diminished air movement throughout, normal effort at rest.  Cardiovascular: RRR, no m/r/g. No peripheral edema.  GI: Soft, distended, moderate diffuse tenderness to palpation  Skin/Integumen: Warm, dry  Other: Alert.  Oriented to person, place, date, reason for hospitalization. Following commands.    Data   Recent Labs   Lab 09/02/19  0915 09/02/19  0506 09/01/19  1620 09/01/19  1005 09/01/19  0506  08/31/19  0558  08/27/19  1603   WBC 25.0*  --  23.0*  --  9.9   --  11.7*   < > 11.7*   HGB 10.6*  --  10.4*  --  8.9*  --  9.1*   < > 11.0*   MCV 94  --  94  --  93  --  92   < > 97     --  310  --  187  --  197   < > 303   NA  --  144 147*  --  148*   < > 143   < > 139   POTASSIUM  --  3.7 4.0 4.4 3.2*  --  3.4   < > 4.7   CHLORIDE  --  110* 113*  --  115*  --  107   < > 102   CO2  --  28 26  --  28  --  29   < > 36*   BUN  --  40* 42*  --  42*  --  46*   < > 37*   CR  --  0.83 0.75  --  0.78   < > 0.97   < > 0.69   ANIONGAP  --   --  8  --  5  --  7   < > 1*   MICHELLE  --  7.9* 8.4*  --  8.2*  --  7.8*   < > 8.8   GLC  --  110* 155*  --  86  --  112*   < > 85   ALBUMIN  --   --  2.6*  --   --   --   --   --  2.9*   PROTTOTAL  --   --  5.5*  --   --   --   --   --  6.2*   BILITOTAL  --   --  0.3  --   --   --   --   --  0.2   ALKPHOS  --   --  83  --   --   --   --   --  142   ALT  --   --  36  --   --   --   --   --  27   AST  --   --  20  --   --   --   --   --  23   TROPI  --   --   --   --   --   --   --   --  <0.015    < > = values in this interval not displayed.       Recent Results (from the past 24 hour(s))   US Upper Extremity Venous Duplex Bilateral Port    Narrative    US UPPER EXTREMITY VENOUS DUPLEX BILATERAL PORTABLE  9/1/2019 11:17 PM      HISTORY: Swelling of bilateral upper extremities, evaluate for deep  vein thrombus.     TECHNIQUE: Venous Doppler US of the upper extremity.? Color flow and  spectral Doppler with waveform analysis performed.    COMPARISON: None.    FINDINGS: Ultrasound of both upper extremities demonstrates no deep  vein thrombus in the subclavian, axillary or brachial veins. No  thrombus seen in the cephalic or basilic veins or visualized portions  of internal jugular veins.      Impression    IMPRESSION: No deep vein thrombus identified in the upper extremities.    JESSICA TOLEDO MD   US Lower Extremity Venous Duplex Bilateral    Narrative    US LOWER EXTREMITY VENOUS DUPLEX BILATERAL  9/1/2019 11:18 PM     HISTORY: Bilateral lower  extremity swelling, evaluate for deep vein  thrombus.    TECHNIQUE: Venous Doppler US of bilateral lower extremities.? Color  flow and spectral Doppler with waveform analysis performed.    COMPARISON: None.    FINDINGS: Ultrasound of both legs demonstrates no deep vein thrombus  from the common femoral through popliteal veins or in the visualized  segments of posterior tibial or peroneal veins in the calves.      Impression    IMPRESSION: No deep vein thrombus identified in either leg.    JESSICA TOLEDO MD   XR Chest Port 1 View    Narrative    CHEST ONE VIEW PORTABLE    9/2/2019 11:30 AM     HISTORY: Hypoxia postoperative extubation, rule out infiltrate.    COMPARISON: Chest x-ray on 8/30/2019      Impression    IMPRESSION: A single portable semiupright AP view of the chest was  obtained. Again noted right central line with the distal tip projects  over high right atrium and proximal tip terminates/projects over right  shoulder. Interval extubation and removal of enteric tube. Stable  enlargement of the cardiac silhouette and mild pulmonary vascular  congestion. Small right pleural effusion and associated basilar  atelectasis/consolidation, slightly increased as compared to 8/30/2019  exam. Trace left pleural effusion, appears new as compared to  8/30/2019 exam. No significant pneumothorax.     Medications     D5W 50 mL/hr at 09/02/19 0800     - MEDICATION INSTRUCTIONS -       - MEDICATION INSTRUCTIONS -         diltiazem ER  300 mg Oral Daily     heparin  5,000 Units Subcutaneous Q12H     methylPREDNISolone  40 mg Intravenous Daily     metoprolol  2.5 mg Intravenous Q6H     pantoprazole (PROTONIX) IV  40 mg Intravenous Daily with breakfast     piperacillin-tazobactam  3.375 g Intravenous Q6H     sodium chloride (PF)  10 mL Intracatheter Q8H     sodium chloride (PF)  3 mL Intracatheter Q8H

## 2019-09-02 NOTE — PROGRESS NOTES
"   09/02/19 1217   General Information   Onset Date 08/27/19   Start of Care Date 09/02/19   Referring Physician Zenobia Figueroa MD   Patient Profile Review/OT: Additional Occupational Profile Info See Profile for full history and prior level of function   Patient/Family Goals Statement did not state   Swallowing Evaluation Bedside swallow evaluation   Behaviorial Observations WFL (within functional limits)   Mode of current nutrition NPO   Respiratory Status O2 Supply   Type of O2 supply   (HFNC)   Comments \"Michelle Polo is a 87 year old female who was admitted on 8/27/2019 for respiratory distress. Found to have perforated duodenal ulcer s/p lap [place]-Joshua patch repair POD 4.\"   Clinical Swallow Evaluation   Oral Musculature generally intact   Structural Abnormalities none present   Dentition present and adequate   Mucosal Quality good   Mandibular Strength and Mobility intact   Oral Labial Strength and Mobility WFL   Lingual Strength and Mobility WFL  (bruise on right side)   Buccal Strength and Mobility intact   Laryngeal Function Swallow;Voicing initiated;Dry swallow palpated   Oral Musculature Comments WFL   Additional Documentation Yes   Clinical Swallow Eval: Thin Liquid Texture Trial   Mode of Presentation, Thin Liquids straw;fed by clinician   Oral Phase of Swallow WFL   Pharyngeal Phase of Swallow intact   Diagnostic Statement no overt s/sx of aspiration   Swallow Eval: Clinical Impressions   Skilled Criteria for Therapy Intervention No problems identified which require skilled intervention   Functional Assessment Scale (FAS) 7   Therapy Frequency 3x/week   Risks and Benefits of Treatment have been explained. Yes   Patient, family and/or staff in agreement with Plan of Care Yes   Clinical Impression Comments SLP consulted due to, \"recently extubated, tenuous resp status sp perforated duodenal ulcer repair on 8/30, assess aspiration risk, appropriateness PO intake\" Pt also admitted with pnx and SLP " consulted, but unable to complete due to respiratory status/surgery. No documented hx of dysphagia and pt denied dysphagia symptoms. Oral motor exam WFL with adequate strength and ROM. Bruising on right side of lingual surface, however, pt denied pain. Vocal quality clear. Okay for ice chips and sips of clears per surgery. Thin water via straw trialed with good oral control and timing and no overt s/sx of aspiration throughout. No appreciable dysphagia on limited evaluation, will plan to follow 3x/wk to further assess solids when okay with surgery.    Total Evaluation Time   Total Evaluation Time (Minutes) 15

## 2019-09-02 NOTE — PROGRESS NOTES
SPIRITUAL HEALTH SERVICES  Spiritual Assessment Progress Note  [facility] ICU  Referral From Staff  Pt is choosing to transition to comfort care.   had brief interaction with pt and daughter and they declined support at this time.    SH continues to be available for support if need arises.                                                                                                                                           Anisa Mensah M.Div., [facility]  Staff    Pager [phone]

## 2019-09-02 NOTE — PROGRESS NOTES
Patient was weaned off of HFNC throughout the day. Pt is currently on a 5L NC with SpO2 in the mid 90's.   Pt is now transitioning to comfort cares.  9/2/2019  Caludia Angeles RRT

## 2019-09-02 NOTE — PLAN OF CARE
Abdomen remains distended and tender. DARRELL drain continues to leak around tube. Minimal output in DARRELL. Pt tolerating sips of water. Dilaudid prn pain Pt transitioned to comfort cares today. Pt transferred to  at this time. Family at bedside and updated on plan of care. Cont to monitor.

## 2019-09-03 NOTE — PROGRESS NOTES
Chart reviewed for LOS  Note pt has transitioned to comfort cares  Diet: regular for comfort  No aggressive nutrition intervention  Will be available as needed    Leny Damian RD, LD  Clinical Dietitian - [facility]  Pager - [phone]

## 2019-09-03 NOTE — PROGRESS NOTES
[facility]    Medicine Progress Note - Hospitalist Service       Date of Admission:  8/27/2019  Assessment & Plan   Michelle Polo is a 87 year-old F who presented with fall and respiratory failure and subsequently developed abdominal pain. She was found to have a perforated ulcer which required surgical repair. She failed initial extubation, was reintubated, later extubated. On 9/2/19 she decided to pursue comfort measures only and was transferred out of the ICU to the hospitalist service.     Acute hypoxic and hypercarbic respiratory failure  COPD with exacerbation  Recent sacral fracture  Old T12, L4 compression fracture   Sepsis secondary to perforated duodenal ulcer status post Joshua patch repair 8/30  Septic encephalopathy  Chronic diastolic congestive heart failure  Pulmonary hypertension  History of SVT  Bilateral pleural effusions  Hypernatremia  Hypertension   Coronary artery disease with history of NSTEMI  Peripheral arterial disease with history of revascularization  Hyperlipidemia   Leukocytosis   Advanced care planning  - Continue comfort measures only.  - Stop oral prednisone, has completed course for COPD exacerbation.  - Continue oral pantoprazole.  - Regular diet for comfort.  - Hydromprophone available for pain. Will add PRN oral morphine solution.  - Contacted social work/ regarding options for discharge. This morning she appears relatively stable and does not appear to be imminently dying today. Discharge to SNF/hospice for end of life cares appears appropriate at this time.       Diet: Regular Diet Adult    DVT Prophylaxis: none, comfort cares  Abel Catheter: in place, indication: End of Life  Code Status: DNR/DNI      Disposition Plan   Expected discharge: today or tomorrow, recommended to SNF with hospice once bed available.  Entered: Joce Gallegos MD 09/03/2019, 8:26 AM       The patient's care was discussed with the Bedside Nurse, Care Coordinator/Social  Worker and Patient.    Joce Gallegos MD  Hospitalist Service  [facility]    ______________________________________________________________________    Interval History   Michelle Polo feels OK this morning. No complaints for me. Denies any significant abdominal pain. Has not had any pain medication since last night. Denies fevers, chest pain, shortness of breath, nausea. Had a BM overnight. No family in the room this morning.    Data reviewed today: I reviewed all medications, new labs and imaging results over the last 24 hours. I personally reviewed no images or EKG's today.    Physical Exam   Vital Signs: Temp: 97.4  F (36.3  C) Temp src: Axillary BP: 128/62 Pulse: 73 Heart Rate: 70 Resp: 18 SpO2: 99 % O2 Device: Nasal cannula Oxygen Delivery: 5 LPM  Weight: 121 lbs 4.05 oz  Constitutional: awake, alert, cooperative, no apparent distress  Respiratory: clear to auscultation anteriorly, no crackles or wheezing  Cardiovascular: regular rate and rhythm, normal S1 and S2, and no murmur noted  GI: normal bowel sounds, soft, non-distended, dressing in place on midline lower abdominal incision, appropriate tenderness  Skin: warm, dry  Neurologic: awake, alert, oriented to name, place and time    Data   Medications     - MEDICATION INSTRUCTIONS -       - MEDICATION INSTRUCTIONS -         pantoprazole  40 mg Oral QAM AC     predniSONE  40 mg Oral Daily     sodium chloride (PF)  10 mL Intracatheter Q8H     sodium chloride (PF)  3 mL Intracatheter Q8H

## 2019-09-03 NOTE — PLAN OF CARE
Comfort cares.  Pt A&Ox4, forgetful.  VSS on oxygen via nasal cannula at 3 liters.  Pain managed w/IV Dilaudid.  Bedrest, up w/A2 and lift.  PIV infusing.  Abel patent, incontinent of bowel.  DARRELL drain patent.  Mepilex on coccyx.  Nursing to continue to monitor.

## 2019-09-03 NOTE — PLAN OF CARE
Shift Note: Comfort Care transfer this shift form ICU, A&O forgetful, legally blind and [employer], bedrest, up with lift, 4L NC for comfort, DARRELL drain to abd, leaking around site (MD aware), small inc to left abd with staples, dilaudid for pain, meplex to coccyx, irby, right subclavian line removed today, dressing CDI

## 2019-09-03 NOTE — PROGRESS NOTES
SW Consult Note:    D/I:  SW acknowledged consult for hospice meeting with family.  SW placed call to patient's daughter Zohreh who requested that SW call son Trey to discuss meeting time.  Trey stated that 11:30 on 9/4 would work to meet for hospice discussion.    P: SW will continue to assist and follow for discharge.    BRENDA Alves, LGSW

## 2019-09-03 NOTE — PROGRESS NOTES
General Surgery - Progress Note    Florinda is an 88yo female admitted 8/27/19 for a fall complicated by respiratory failure. Developed acute abdominal pain 8/29 and CT showed perforated ulcer. She is now POD 4 s/p lap abdominal exploration with repair of perforated ulcer, chintan patch, abdominal washout and drain placement. On 9/2/19 the patient decided to pursue comfort measures only and was transferred to the 8th floor.    She denies abdominal pain during my discussion with her today. Her abdomen does not appear distended. The DARRELL drain is in place, 120cc out x 24 hours, serosanguinous fluid. Staples are intact, incisions clean and dry. Dressing in place over drain insertion site. I discussed with Florinda that as our goal is for her to be as comfortable as possible and not continue any interventions that would cause her discomfort, I offered her the option of removing the DARRELL drain today vs leaving it in. After discussing this, Florinda would prefer to leave the drain in for now as it is not bothering her at all. If she does leave the hospital for hospice care, I would recommend removing the drain prior to discharge.     We will not return to see the patient while she remains in the hospital as she is comfort measures only. Orders are updated and our contact information was provided in the AVS. Please call with any questions.      Idalia Ricketts PA-C  [facility]  [phone]

## 2019-09-04 NOTE — CONSULTS
SW Consult Note:    Care Transition Initial Assessment - SW     Met with: Children (Brown Bruner, Gerardo)    Active Problems:    COPD exacerbation (H)       DATA  Lives With: alone   Living Arrangements: apartment(came from TCU)  Quality of Family Relationships: helpful, involved, supportive  Description of Support System: Supportive, Involved  Who is your support system?: Children  Support Assessment: Adequate family and caregiver support, Adequate social supports.   Identified issues/concerns regarding health management:  Patient is an 87 year old female that came into the hospital on 8/27/19 with a primary diagnosis of COPD exacerbation.  Patient was staying at [facility] prior to her hospital admission.  Prior to TCU, patient was living independently in her apartment.  Reviewed chart and spoke with patient's children regarding end of life/hospice care for patient.  Provided resources for hospice agencies, inpatient hospice facilities and long term care facilities.  Discussed cost of facilities and Medicare Hospice benefits.  Family had a lot of questions regarding patient's condition, patient returning back to TCU, CT scan and antibiotics.  SW informed nurse to update physician to meet with family to discuss.  SW will remain available to assist with discharge planning as needed.       Quality of Family Relationships: helpful, involved, supportive     ASSESSMENT  Cognitive Status: Pt on comfort cares, not assessed.   Concerns to be addressed: Discharge planning, Hospice.      PLAN  Financial costs for the patient includes: Discussed costs for room and board for facility/hospice placement.   Patient given options and choices for discharge: Yes  Patient/family is agreeable to the plan?  Yes  Patient Goals and Preferences: TBD  Patient anticipates discharging to: TBD    Praveen Gates, MSW, LGSW

## 2019-09-04 NOTE — PROGRESS NOTES
[facility]    Medicine Progress Note - Hospitalist Service       Date of Admission:  8/27/2019  Assessment & Plan   Michelle Polo is a 87 year-old F who presented with fall and respiratory failure and subsequently developed abdominal pain. She was found to have a perforated ulcer which required surgical repair. She failed initial extubation, was reintubated, later extubated. On 9/2/19 she decided to pursue comfort measures only and was transferred out of the ICU to the hospitalist service.     Acute hypoxic and hypercarbic respiratory failure  COPD with exacerbation  Recent sacral fracture  Old T12, L4 compression fracture   Sepsis secondary to perforated duodenal ulcer status post Joshua patch repair 8/30  Septic encephalopathy  Chronic diastolic congestive heart failure  Pulmonary hypertension  History of SVT  Bilateral pleural effusions  Hypernatremia  Hypertension   Coronary artery disease with history of NSTEMI  Peripheral arterial disease with history of revascularization  Hyperlipidemia   Leukocytosis   Advanced care planning  - Continue comfort measures only.  - Oral prednisone stopped on 9/3/19, completed course for COPD exacerbation.  - Continue oral pantoprazole as mental status allows.  - Regular diet for comfort.  - PRN oral morphine solution for pain control/shortness of breath.  - Due to significant deterioration in mental status, recommend continuing comfort cares in the hospital today. Based on this change, I would not be surprised if she passes away in the next 24-48 hours.  - Updated her family in the room today. Also discussed with social work/case management.       Diet: Regular Diet Adult    DVT Prophylaxis: none, comfort cares  Abel Catheter: in place, indication: End of Life  Code Status: DNR/DNI      Disposition Plan   Expected discharge: unclear, continue care in the hospital today, not appropriate to transfer out of the hospital today due to the significant deterioration  in her condition, may be imminently dying.   Entered: Joce Gallegos MD 09/04/2019, 11:20 AM       The patient's care was discussed with the Bedside Nurse and Patient's Family.    Joce Gallegos MD  Hospitalist Service  [facility]    ______________________________________________________________________    Interval History   Michelle Polo is much less responsive this morning. Yesterday, she was talking and interacting with me without any difficulty. This morning, she barely tried to mumble to noxious stimuli, did not respond to my voice. Unable to obtain review of systems due to mental status.    Data reviewed today: I reviewed all medications, new labs and imaging results over the last 24 hours. I personally reviewed no images or EKG's today.    Physical Exam   Vital Signs:           Resp: 16        Weight: 121 lbs 4.05 oz  Constitutional: somnolent  Respiratory: clear to auscultation anteriorly  Cardiovascular: regular rate and rhythm  GI: soft, BS+, no apparent tenderness  Skin: warm, dry  Neurologic: somnolent, tried to speak in response to noxious stimuli, no response to verbal stimuli    Data   Medications     - MEDICATION INSTRUCTIONS -       - MEDICATION INSTRUCTIONS -         pantoprazole  40 mg Oral QAM AC     sodium chloride (PF)  3 mL Intracatheter Q8H

## 2019-09-04 NOTE — PLAN OF CARE
Comfort cares. Pain controlled with PO morphine. Turned as needed for comfort. DARRELL drain and irby in place. Plans to discharge today on hospice.

## 2019-09-04 NOTE — PLAN OF CARE
A&Ox4; forgetful at times. Comfort cares; VS deferred. Morphine given x1 for c/o abdominal pain; ineffective. C/o abdominal 8/10 pain after eating pudding; zofran given x1, dilaudid given x1; effective. DARRELL leaking earlier in the day; dressing CDI. Abel has good UO; clear yellow in color. Plan is to discharge to hospice 9/4.

## 2019-09-04 NOTE — PLAN OF CARE
Minimal responsiveness this AM, barely opened eyes, not speaking. Respirations irregular, shallow, occasional grunting. Morphine given for respiratory distress.  This afternoon has responded to family. States she is comfortable. Lethargic. Possible hospice discharge tomorrow.

## 2019-09-05 NOTE — PROVIDER NOTIFICATION
House provider notified of pt death at 10:55 as well as pt's hospitalist, Dr. Arndt. No HR or visible RR were seen or heard. House provider observed pt and called time of death at 10:58. Pt belonging were sent home with daughter Zohreh. ANS number provided for family. Security was called for transfer of pt at 13:50 and patient was transferred.

## 2019-09-05 NOTE — PLAN OF CARE
Pt very lethargic. Minimal responsiveness ex short period of alert time ~1800. Respirations irregular, shallow; occasional grunting. Oral morphine given x2 for respiratory distress; effective. Possible hospice discharge tomorrow.

## 2019-09-05 NOTE — PLAN OF CARE
Comfort cares. Pt very lethargic. Minimal responsiveness ex short period of alert time ~2315. Respirations irregular, shallow; occasional grunting. Oral morphine given x1 for respiratory distress; effective. Currently on 10 L O2 via  Oxymask.   Plan: possible discharge to hospice today.

## 2019-09-06 NOTE — DISCHARGE SUMMARY
[facility]    Death Summary - Hospitalist Service     Date of Admission:  2019  Date of Death: 2019  1:44 PM  Discharging Provider: Yousif Arndt MD    Hospital Course   Michelle Polo is a 87 year-old F who presented with fall and respiratory failure and subsequently developed abdominal pain. She was found to have a perforated ulcer which required surgical repair. She failed initial extubation, was reintubated, later extubated. On 19 she decided to pursue comfort measures only and was transferred out of the ICU to the hospitalist service.     Acute hypoxic and hypercarbic respiratory failure  COPD with exacerbation  Recent sacral fracture  Old T12, L4 compression fracture   Sepsis secondary to perforated duodenal ulcer status post Joshua patch repair   Septic encephalopathy  Chronic diastolic congestive heart failure  Pulmonary hypertension  History of SVT  Bilateral pleural effusions  Hypernatremia  Hypertension   Coronary artery disease with history of NSTEMI  Peripheral arterial disease with history of revascularization  Hyperlipidemia   Leukocytosis   Advanced care planning    The patient was placed on comfort care and  on .    Cause of death:    acute respiratory failure     Yousif Arndt MD  [facility]  ______________________________________________________________________      Significant Results and Procedures   Most Recent 3 CBC's:  Recent Labs   Lab Test 19  0915 19  1620 19  0506   WBC 25.0* 23.0* 9.9   HGB 10.6* 10.4* 8.9*   MCV 94 94 93    310 187     Most Recent 3 BMP's:  Recent Labs   Lab Test 19  0506 19  1620 19  1005 19  0506  19  0558    147*  --  148*   < > 143   POTASSIUM 3.7 4.0 4.4 3.2*  --  3.4   CHLORIDE 110* 113*  --  115*  --  107   CO2 28 26  --  28  --  29   BUN 40* 42*  --  42*  --  46*   CR 0.83 0.75  --  0.78   < > 0.97   ANIONGAP  --  8  --  5   --  7   MICHELLE 7.9* 8.4*  --  8.2*  --  7.8*   * 155*  --  86  --  112*    < > = values in this interval not displayed.     Most Recent 2 LFT's:  Recent Labs   Lab Test 09/01/19  1620 08/27/19  1603   AST 20 23   ALT 36 27   ALKPHOS 83 142   BILITOTAL 0.3 0.2     Most Recent 6 Bacteria Isolates From Any Culture (See EPIC Reports for Culture Details):  Recent Labs   Lab Test 12/17/14  1735 02/20/14  0935 02/20/14  0921 02/19/14  0612 02/19/14  0441   CULT No Salmonella, Shigella, Campylobacter, E. coli O157, Aeromonas, or Plesiomonas   isolated.   No growth No growth Cultured on the 1st day of incubation: Escherichia coli Critical Value, preliminary result only, called to and read back by ALIDA ENCISO RN, ON 02/19/14 @ 2152, SERGE Susceptibility testing done on previous specimen* Cultured on the 1st day of incubation: Escherichia coli Critical Value, preliminary result only, called to and read back by ALIDA ENCISO RN, ON 02/19/14 @ 2041, SERGE*   ,   Results for orders placed or performed during the hospital encounter of 08/27/19   XR Chest Port 1 View    Narrative    CHEST ONE VIEW PORTABLE   8/27/2019 5:08 PM     HISTORY: Respiratory distress    COMPARISON: 7/16/2019      Impression    IMPRESSION:  1. Increase in small pleural effusions.  2. Mild cardiomegaly, diffuse increased interstitial markings.  3. Mild focal infiltrate or atelectasis right lung base is new.    DEVIKA GREENE MD   CT Chest Pulmonary Embolism w Contrast    Narrative    CT CHEST PULMONARY EMBOLISM WITH CONTRAST8/27/2019 8:08 PM    HISTORY: Dyspnea, pelvic fracture, elevated D-dimer.    TECHNIQUE: Axial images from thoracic inlet to diaphragm. 54 mL  Isovue-370. Radiation dose for this scan was reduced using automated  exposure control, adjustment of the mA and/or kV according to patient  size, or iterative reconstruction technique.    COMPARISON: 7/5/2014 CT chest    FINDINGS:   CHEST: No evidence for acute pulmonary embolus.  Extensive  atherosclerotic vascular calcification in the chest and upper abdomen.  No evidence for thoracic aortic dissection. There is coronary artery  calcification. Moderately large bilateral pleural effusions.    Multiple linear bands consistent with discoid atelectasis upper, mid  and lower lobes. 1.2 cm groundglass nodule left lower lobe series 6  image 71 new since prior exam.    There is focal lobulation along the lateral upper left kidney series 5  image 146. Further evaluation recommended to rule out a 3 cm left  renal mass versus lobulation.      Impression    IMPRESSION:  1. Moderately large bilateral pleural effusions.  2. No evidence for acute pulmonary embolus.  3. 1.2 cm left lower lobe groundglass nodule. Nonspecific, may be  focal infection or inflammation. Malignancy such as adenocarcinoma  could have a similar appearance. Three-month follow-up and correlation  with any more recent chest CTs for stability.  4. Further evaluation to rule out 3 cm upper left renal mass versus  lobulation.    DEVIKA GREENE MD   CT Abdomen Pelvis w Contrast     Value    Radiologist flags Pneumoperitoneum (AA)    Narrative    CT ABDOMEN PELVIS W CONTRAST  8/30/2019 1:18 AM     HISTORY: Abdominal distension. Abdominal pain, unspecified,  gastroenteritis or colitis suspected. Abdominal infection (including  peritonitis).    TECHNIQUE: Volumetric acquisition through abdomen and pelvis with IV  contrast. 52 mL Isovue-370. Radiation dose for this scan was reduced  using automated exposure control, adjustment of the mA and/or kV  according to patient size, or iterative reconstruction technique.    COMPARISON: 12/15/2014.    FINDINGS: Moderate pleural effusions, greater on the right.    There is a small amount of free air in the upper abdomen. Mild fat  stranding/inflammation in the right upper quadrant near the duodenum  and gastric antrum with a tiny gas bubble adjacent to the proximal  duodenum. Additional tiny bubbles  of gas just anterior to this.  Suspect perforated peptic ulcer as the etiology of the free air.  Minimal ascites.    Mildly distended gallbladder. No calcified gallstones. Liver, spleen,  pancreas and left adrenal gland are negative. Stable 1.9 cm right  adrenal nodule. Bilateral renal cysts and a few small subcentimeter  low-dense lesions which are too small to accurately characterize.  There is a 2.4 cm indeterminate rounded lesion at the upper pole of  the left kidney laterally measuring approximately 50 Hounsfield units.  This could be a hemorrhagic cyst or a solid lesion. Recommend  follow-up MRI.    Uterus is present. Small amount of free fluid. Colonic diverticulosis.  No bowel obstruction. Degenerative changes in the visualized spine.  Moderate compression of T12 is age indeterminate but new since the  prior study.      Impression    IMPRESSION:  1. Pneumoperitoneum.  2. Suspect perforated peptic ulcer as the etiology. See above.  3. 2.4 cm left renal lesion is indeterminate; hemorrhagic cyst versus  solid lesion/renal cell carcinoma. Follow-up outpatient MRI  recommended for further evaluation.  4. Mild gallbladder distention. Colonic diverticulosis.  5. Bilateral pleural effusions, greater on the right.  6. Age-indeterminate compression of T12.    [Critical Result: Pneumoperitoneum]    Finding was identified on 8/30/2019 1:24 AM.     Neymar Chacon NP, was contacted by me on 8/30/2019 1:34 AM and  verbalized understanding of the critical result.    JESSICA TOLEDO MD   XR Chest Port 1 View    Narrative    CHEST ONE VIEW SEMI-UPRIGHT 8/30/2019 9:10 PM     HISTORY: Confirm central line placement.    COMPARISON: August 27, 2019      Impression    IMPRESSION: New right subclavian line with tip in the mid to low SVC.  No pneumothorax demonstrated. Question minimal effusion on the right  similar to previous. Endotracheal tube tip mid trachea. Atelectasis  and effusion on the left is improved compared to  previous.    DALLAS CHATMAN MD   US Lower Extremity Venous Duplex Bilateral    Narrative    US LOWER EXTREMITY VENOUS DUPLEX BILATERAL  9/1/2019 11:18 PM     HISTORY: Bilateral lower extremity swelling, evaluate for deep vein  thrombus.    TECHNIQUE: Venous Doppler US of bilateral lower extremities.? Color  flow and spectral Doppler with waveform analysis performed.    COMPARISON: None.    FINDINGS: Ultrasound of both legs demonstrates no deep vein thrombus  from the common femoral through popliteal veins or in the visualized  segments of posterior tibial or peroneal veins in the calves.      Impression    IMPRESSION: No deep vein thrombus identified in either leg.    JESSICA TOLEDO MD   US Upper Extremity Venous Duplex Bilateral Port    Narrative    US UPPER EXTREMITY VENOUS DUPLEX BILATERAL PORTABLE  9/1/2019 11:17 PM      HISTORY: Swelling of bilateral upper extremities, evaluate for deep  vein thrombus.     TECHNIQUE: Venous Doppler US of the upper extremity.? Color flow and  spectral Doppler with waveform analysis performed.    COMPARISON: None.    FINDINGS: Ultrasound of both upper extremities demonstrates no deep  vein thrombus in the subclavian, axillary or brachial veins. No  thrombus seen in the cephalic or basilic veins or visualized portions  of internal jugular veins.      Impression    IMPRESSION: No deep vein thrombus identified in the upper extremities.    JESSICA TOLEDO MD   XR Chest Port 1 View    Narrative    CHEST ONE VIEW PORTABLE    9/2/2019 11:30 AM     HISTORY: Hypoxia postoperative extubation, rule out infiltrate.    COMPARISON: Chest x-ray on 8/30/2019      Impression    IMPRESSION: A single portable semiupright AP view of the chest was  obtained. Again noted right central line with the distal tip projects  over high right atrium and proximal tip terminates/projects over right  shoulder. Interval extubation and removal of enteric tube. Stable  enlargement of the cardiac silhouette and  mild pulmonary vascular  congestion. Small right pleural effusion and associated basilar  atelectasis/consolidation, slightly increased as compared to 8/30/2019  exam. Trace left pleural effusion, appears new as compared to  8/30/2019 exam. No significant pneumothorax.    JONATHAN JULIO MD       Consultations This Hospital Stay   PHARMACY TO DOSE VANCO  PHARMACY TO DOSE VANCO  SPEECH LANGUAGE PATH ADULT IP CONSULT  PHYSICAL THERAPY ADULT IP CONSULT  SURGERY GENERAL IP CONSULT  SPEECH LANGUAGE PATH ADULT IP CONSULT  SOCIAL WORK IP CONSULT    Primary Care Physician   Brittany Wiggins    Time Spent on this Encounter   I, Yousif Arndt MD, discharged this patient today but I did not personally see the patient today and will not be billing for the patient's discharge.

## (undated) DEVICE — ESU GROUND PAD UNIVERSAL W/O CORD

## (undated) DEVICE — GLOVE PROTEXIS BLUE W/NEU-THERA 7.5  2D73EB75

## (undated) DEVICE — ENDO TROCAR SLEEVE KII Z-THREADED 05X100MM CTS02

## (undated) DEVICE — KIT SURGICAL TURNOVER FVSD-01D

## (undated) DEVICE — SU VICRYL 2-0 SH 27" J317H

## (undated) DEVICE — PACK LAP CHOLE SLC15LCFSD

## (undated) DEVICE — DRAIN JACKSON PRATT CHANNEL 19FR ROUND HUBLESS SIL JP-2230

## (undated) DEVICE — SUCTION IRR STRYKERFLOW II W/TIP 250-070-520

## (undated) DEVICE — BLADE KNIFE SURG 10 371110

## (undated) DEVICE — ENDO TROCAR FIRST ENTRY KII FIOS Z-THRD 05X100MM CTF03

## (undated) DEVICE — DRSG GAUZE 4X4" 3033

## (undated) DEVICE — DRAIN JACKSON PRATT RESERVOIR 100ML SU130-1305

## (undated) DEVICE — SYSTEM CLEARIFY VISUALIZATION 21-345

## (undated) DEVICE — TUBING C02 INSUFFLATION LAP FILTER HEATER 6198

## (undated) DEVICE — SU ETHILON 3-0 FS-1 18" 669H

## (undated) DEVICE — LINEN TOWEL PACK X5 5464

## (undated) DEVICE — APPLICATOR EVICEL RIGID TIP 35CM 3908

## (undated) DEVICE — SOL NACL 0.9% IRRIG 1000ML BOTTLE 2F7124

## (undated) DEVICE — SU VICRYL 0 UR-6 27" J603H

## (undated) DEVICE — PREP CHLORAPREP 26ML TINTED ORANGE  260815

## (undated) DEVICE — ENDO TROCAR BLUNT TIP KII BALLOON 12X100MM C0R47

## (undated) DEVICE — ESU PENCIL W/HOLSTER E2350H

## (undated) DEVICE — SOL NACL 0.9% IRRIG 3000ML BAG 2B7477

## (undated) DEVICE — EVAC SYSTEM CLEAR FLOW SC082500

## (undated) DEVICE — STPL SKIN PROXIMATE 35 WIDE PMW35

## (undated) DEVICE — GLOVE PROTEXIS MICRO 7.5  2D73PM75

## (undated) DEVICE — SOL WATER IRRIG 1000ML BOTTLE 2F7114

## (undated) RX ORDER — LIDOCAINE HYDROCHLORIDE 20 MG/ML
INJECTION, SOLUTION EPIDURAL; INFILTRATION; INTRACAUDAL; PERINEURAL
Status: DISPENSED
Start: 2019-01-01

## (undated) RX ORDER — METOPROLOL TARTRATE 1 MG/ML
INJECTION, SOLUTION INTRAVENOUS
Status: DISPENSED
Start: 2019-01-01

## (undated) RX ORDER — LIDOCAINE HYDROCHLORIDE 10 MG/ML
INJECTION, SOLUTION INFILTRATION; PERINEURAL
Status: DISPENSED
Start: 2019-01-01

## (undated) RX ORDER — FENTANYL CITRATE 50 UG/ML
INJECTION, SOLUTION INTRAMUSCULAR; INTRAVENOUS
Status: DISPENSED
Start: 2019-01-01

## (undated) RX ORDER — NEOSTIGMINE METHYLSULFATE 1 MG/ML
VIAL (ML) INJECTION
Status: DISPENSED
Start: 2019-01-01

## (undated) RX ORDER — PROPOFOL 10 MG/ML
INJECTION, EMULSION INTRAVENOUS
Status: DISPENSED
Start: 2019-01-01

## (undated) RX ORDER — NALOXONE HYDROCHLORIDE 0.4 MG/ML
INJECTION, SOLUTION INTRAMUSCULAR; INTRAVENOUS; SUBCUTANEOUS
Status: DISPENSED
Start: 2019-01-01

## (undated) RX ORDER — ONDANSETRON 2 MG/ML
INJECTION INTRAMUSCULAR; INTRAVENOUS
Status: DISPENSED
Start: 2019-01-01

## (undated) RX ORDER — BUPIVACAINE HYDROCHLORIDE AND EPINEPHRINE 2.5; 5 MG/ML; UG/ML
INJECTION, SOLUTION EPIDURAL; INFILTRATION; INTRACAUDAL; PERINEURAL
Status: DISPENSED
Start: 2019-01-01

## (undated) RX ORDER — PIPERACILLIN SODIUM, TAZOBACTAM SODIUM 3; .375 G/15ML; G/15ML
INJECTION, POWDER, LYOPHILIZED, FOR SOLUTION INTRAVENOUS
Status: DISPENSED
Start: 2019-01-01

## (undated) RX ORDER — GLYCOPYRROLATE 0.2 MG/ML
INJECTION, SOLUTION INTRAMUSCULAR; INTRAVENOUS
Status: DISPENSED
Start: 2019-01-01

## (undated) RX ORDER — LABETALOL HYDROCHLORIDE 5 MG/ML
INJECTION, SOLUTION INTRAVENOUS
Status: DISPENSED
Start: 2019-01-01